# Patient Record
Sex: MALE | Race: WHITE | Employment: OTHER | URBAN - NONMETROPOLITAN AREA
[De-identification: names, ages, dates, MRNs, and addresses within clinical notes are randomized per-mention and may not be internally consistent; named-entity substitution may affect disease eponyms.]

---

## 2017-05-17 ENCOUNTER — HOSPITAL ENCOUNTER (OUTPATIENT)
Age: 82
Discharge: HOME | End: 2017-05-17
Payer: MEDICARE

## 2017-05-17 DIAGNOSIS — I10 ESSENTIAL HYPERTENSION: ICD-10-CM

## 2017-05-17 DIAGNOSIS — E11.42 TYPE 2 DIABETES MELLITUS WITH DIABETIC POLYNEUROPATHY, WITHOUT LONG-TERM CURRENT USE OF INSULIN (HCC): ICD-10-CM

## 2017-05-17 DIAGNOSIS — I25.810 CORONARY ARTERY DISEASE INVOLVING AUTOLOGOUS ARTERY CORONARY BYPASS GRAFT WITHOUT ANGINA PECTORIS: ICD-10-CM

## 2017-05-17 LAB
ANION GAP SERPL CALCULATED.3IONS-SCNC: 14 MMOL/L (ref 9–17)
BUN BLDV-MCNC: 21 MG/DL (ref 8–23)
BUN/CREAT BLD: 27 (ref 9–20)
CALCIUM SERPL-MCNC: 9.8 MG/DL (ref 8.6–10.4)
CHLORIDE BLD-SCNC: 96 MMOL/L (ref 98–107)
CO2: 29 MMOL/L (ref 20–31)
CREAT SERPL-MCNC: 0.78 MG/DL (ref 0.7–1.2)
ESTIMATED AVERAGE GLUCOSE: 174 MG/DL
GFR AFRICAN AMERICAN: >60 ML/MIN
GFR NON-AFRICAN AMERICAN: >60 ML/MIN
GFR SERPL CREATININE-BSD FRML MDRD: ABNORMAL ML/MIN/{1.73_M2}
GFR SERPL CREATININE-BSD FRML MDRD: ABNORMAL ML/MIN/{1.73_M2}
GLUCOSE BLD-MCNC: 122 MG/DL (ref 70–99)
HBA1C MFR BLD: 7.7 % (ref 4.8–5.9)
POTASSIUM SERPL-SCNC: 5 MMOL/L (ref 3.7–5.3)
SODIUM BLD-SCNC: 139 MMOL/L (ref 135–144)

## 2017-05-17 PROCEDURE — 83036 HEMOGLOBIN GLYCOSYLATED A1C: CPT

## 2017-05-17 PROCEDURE — 80048 BASIC METABOLIC PNL TOTAL CA: CPT

## 2017-05-17 PROCEDURE — 36415 COLL VENOUS BLD VENIPUNCTURE: CPT

## 2017-05-25 ENCOUNTER — HOSPITAL ENCOUNTER (OUTPATIENT)
Dept: VASCULAR LAB | Age: 82
Discharge: HOME | End: 2017-05-25
Payer: MEDICARE

## 2017-05-25 DIAGNOSIS — M79.605 PAIN IN BOTH LOWER EXTREMITIES: ICD-10-CM

## 2017-05-25 DIAGNOSIS — M79.604 PAIN IN BOTH LOWER EXTREMITIES: ICD-10-CM

## 2017-05-25 PROCEDURE — 93970 EXTREMITY STUDY: CPT

## 2017-06-06 ENCOUNTER — HOSPITAL ENCOUNTER (OUTPATIENT)
Dept: VASCULAR LAB | Age: 82
Discharge: HOME | End: 2017-06-06
Payer: MEDICARE

## 2017-06-06 DIAGNOSIS — R09.89 OTHER SPECIFIED SYMPTOMS AND SIGNS INVOLVING THE CIRCULATORY AND RESPIRATORY SYSTEMS: ICD-10-CM

## 2017-06-06 PROCEDURE — 93923 UPR/LXTR ART STDY 3+ LVLS: CPT

## 2017-08-12 ENCOUNTER — HOSPITAL ENCOUNTER (EMERGENCY)
Age: 82
Discharge: HOME | End: 2017-08-12
Attending: EMERGENCY MEDICINE
Payer: MEDICARE

## 2017-08-12 VITALS
TEMPERATURE: 97.8 F | OXYGEN SATURATION: 96 % | RESPIRATION RATE: 20 BRPM | DIASTOLIC BLOOD PRESSURE: 69 MMHG | HEART RATE: 74 BPM | SYSTOLIC BLOOD PRESSURE: 125 MMHG

## 2017-08-12 DIAGNOSIS — E86.0 DEHYDRATION: ICD-10-CM

## 2017-08-12 DIAGNOSIS — R19.7 DIARRHEA OF PRESUMED INFECTIOUS ORIGIN: Primary | ICD-10-CM

## 2017-08-12 LAB
-: ABNORMAL
ABSOLUTE BANDS #: 0.07 K/UL (ref 0–1)
ABSOLUTE EOS #: 0.07 K/UL (ref 0–0.4)
ABSOLUTE LYMPH #: 1.82 K/UL (ref 1–4.8)
ABSOLUTE MONO #: 0.78 K/UL (ref 0–1)
AMORPHOUS: ABNORMAL
ANION GAP SERPL CALCULATED.3IONS-SCNC: 15 MMOL/L (ref 9–17)
BACTERIA: ABNORMAL
BANDS: 1 %
BASOPHILS # BLD: 0 %
BASOPHILS ABSOLUTE: 0 K/UL (ref 0–0.2)
BILIRUBIN URINE: NEGATIVE
BUN BLDV-MCNC: 19 MG/DL (ref 8–23)
BUN/CREAT BLD: 35 (ref 9–20)
CALCIUM SERPL-MCNC: 9.6 MG/DL (ref 8.6–10.4)
CASTS UA: ABNORMAL /LPF
CHLORIDE BLD-SCNC: 97 MMOL/L (ref 98–107)
CO2: 24 MMOL/L (ref 20–31)
COLOR: YELLOW
COMMENT UA: ABNORMAL
CREAT SERPL-MCNC: 0.54 MG/DL (ref 0.7–1.2)
CRYSTALS, UA: ABNORMAL /HPF
DIFFERENTIAL TYPE: ABNORMAL
EOSINOPHILS RELATIVE PERCENT: 1 %
EPITHELIAL CELLS UA: ABNORMAL /HPF (ref 0–5)
GFR AFRICAN AMERICAN: >60 ML/MIN
GFR NON-AFRICAN AMERICAN: >60 ML/MIN
GFR SERPL CREATININE-BSD FRML MDRD: ABNORMAL ML/MIN/{1.73_M2}
GFR SERPL CREATININE-BSD FRML MDRD: ABNORMAL ML/MIN/{1.73_M2}
GLUCOSE BLD-MCNC: 159 MG/DL (ref 70–99)
GLUCOSE URINE: NEGATIVE
HCT VFR BLD CALC: 38.6 % (ref 41–53)
HEMOGLOBIN: 13.1 G/DL (ref 13.5–17)
KETONES, URINE: ABNORMAL
LEUKOCYTE ESTERASE, URINE: NEGATIVE
LYMPHOCYTES # BLD: 28 %
MCH RBC QN AUTO: 31.2 PG (ref 26–34)
MCHC RBC AUTO-ENTMCNC: 33.9 G/DL (ref 31–37)
MCV RBC AUTO: 92 FL (ref 80–100)
MONOCYTES # BLD: 12 %
MORPHOLOGY: NORMAL
MUCUS: ABNORMAL
NITRITE, URINE: NEGATIVE
OTHER OBSERVATIONS UA: ABNORMAL
PDW BLD-RTO: 13.7 % (ref 12.1–15.2)
PH UA: 5.5 (ref 5–9)
PLATELET # BLD: 146 K/UL (ref 140–450)
PLATELET ESTIMATE: ABNORMAL
PMV BLD AUTO: 8.7 FL (ref 6–12)
POTASSIUM SERPL-SCNC: 4.4 MMOL/L (ref 3.7–5.3)
PROTEIN UA: NEGATIVE
RBC # BLD: 4.19 M/UL (ref 4.5–5.9)
RBC # BLD: ABNORMAL 10*6/UL
RBC UA: ABNORMAL /HPF (ref 0–2)
RENAL EPITHELIAL, UA: ABNORMAL /HPF
SEG NEUTROPHILS: 58 %
SEGMENTED NEUTROPHILS ABSOLUTE COUNT: 3.76 K/UL (ref 1.8–7.7)
SODIUM BLD-SCNC: 136 MMOL/L (ref 135–144)
SPECIFIC GRAVITY UA: 1.02 (ref 1.01–1.02)
TRICHOMONAS: ABNORMAL
TURBIDITY: CLEAR
URINE HGB: NEGATIVE
UROBILINOGEN, URINE: NORMAL
WBC # BLD: 6.5 K/UL (ref 3.5–11)
WBC # BLD: ABNORMAL 10*3/UL
WBC UA: ABNORMAL /HPF (ref 0–5)
YEAST: ABNORMAL

## 2017-08-12 PROCEDURE — 6370000000 HC RX 637 (ALT 250 FOR IP): Performed by: EMERGENCY MEDICINE

## 2017-08-12 PROCEDURE — 2580000003 HC RX 258: Performed by: EMERGENCY MEDICINE

## 2017-08-12 PROCEDURE — 85025 COMPLETE CBC W/AUTO DIFF WBC: CPT

## 2017-08-12 PROCEDURE — 99284 EMERGENCY DEPT VISIT MOD MDM: CPT

## 2017-08-12 PROCEDURE — 36415 COLL VENOUS BLD VENIPUNCTURE: CPT

## 2017-08-12 PROCEDURE — 80048 BASIC METABOLIC PNL TOTAL CA: CPT

## 2017-08-12 PROCEDURE — 96375 TX/PRO/DX INJ NEW DRUG ADDON: CPT

## 2017-08-12 PROCEDURE — 87328 CRYPTOSPORIDIUM AG IA: CPT

## 2017-08-12 PROCEDURE — 96374 THER/PROPH/DIAG INJ IV PUSH: CPT

## 2017-08-12 PROCEDURE — 81001 URINALYSIS AUTO W/SCOPE: CPT

## 2017-08-12 PROCEDURE — 6360000002 HC RX W HCPCS: Performed by: EMERGENCY MEDICINE

## 2017-08-12 PROCEDURE — 96361 HYDRATE IV INFUSION ADD-ON: CPT

## 2017-08-12 PROCEDURE — 87329 GIARDIA AG IA: CPT

## 2017-08-12 PROCEDURE — 96372 THER/PROPH/DIAG INJ SC/IM: CPT

## 2017-08-12 RX ORDER — SODIUM CHLORIDE 9 MG/ML
150 INJECTION, SOLUTION INTRAVENOUS CONTINUOUS
Status: DISCONTINUED | OUTPATIENT
Start: 2017-08-12 | End: 2017-08-12 | Stop reason: HOSPADM

## 2017-08-12 RX ORDER — ONDANSETRON 4 MG/1
4 TABLET, FILM COATED ORAL EVERY 4 HOURS PRN
Qty: 15 TABLET | Refills: 0 | Status: SHIPPED | OUTPATIENT
Start: 2017-08-12 | End: 2017-10-03 | Stop reason: ALTCHOICE

## 2017-08-12 RX ORDER — DICYCLOMINE HCL 20 MG
20 TABLET ORAL EVERY 4 HOURS PRN
Qty: 30 TABLET | Refills: 0 | Status: SHIPPED | OUTPATIENT
Start: 2017-08-12 | End: 2017-10-03 | Stop reason: ALTCHOICE

## 2017-08-12 RX ORDER — DICYCLOMINE HYDROCHLORIDE 10 MG/1
20 CAPSULE ORAL ONCE
Status: COMPLETED | OUTPATIENT
Start: 2017-08-12 | End: 2017-08-12

## 2017-08-12 RX ORDER — ONDANSETRON 2 MG/ML
4 INJECTION INTRAMUSCULAR; INTRAVENOUS ONCE
Status: COMPLETED | OUTPATIENT
Start: 2017-08-12 | End: 2017-08-12

## 2017-08-12 RX ORDER — FENTANYL CITRATE 50 UG/ML
25 INJECTION, SOLUTION INTRAMUSCULAR; INTRAVENOUS ONCE
Status: COMPLETED | OUTPATIENT
Start: 2017-08-12 | End: 2017-08-12

## 2017-08-12 RX ORDER — ROSUVASTATIN CALCIUM 20 MG/1
20 TABLET, COATED ORAL DAILY
COMMUNITY
End: 2017-11-08 | Stop reason: SDUPTHER

## 2017-08-12 RX ORDER — DICYCLOMINE HYDROCHLORIDE 10 MG/ML
20 INJECTION INTRAMUSCULAR ONCE
Status: COMPLETED | OUTPATIENT
Start: 2017-08-12 | End: 2017-08-12

## 2017-08-12 RX ADMIN — SODIUM CHLORIDE 150 ML/HR: 9 INJECTION, SOLUTION INTRAVENOUS at 09:01

## 2017-08-12 RX ADMIN — DICYCLOMINE HYDROCHLORIDE 20 MG: 20 INJECTION, SOLUTION INTRAMUSCULAR at 09:03

## 2017-08-12 RX ADMIN — DICYCLOMINE HYDROCHLORIDE 20 MG: 10 CAPSULE ORAL at 11:06

## 2017-08-12 RX ADMIN — ONDANSETRON 4 MG: 2 INJECTION INTRAMUSCULAR; INTRAVENOUS at 09:01

## 2017-08-12 RX ADMIN — SODIUM CHLORIDE 500 ML: 9 INJECTION, SOLUTION INTRAVENOUS at 09:00

## 2017-08-12 RX ADMIN — FENTANYL CITRATE 25 MCG: 50 INJECTION INTRAMUSCULAR; INTRAVENOUS at 09:05

## 2017-08-12 ASSESSMENT — ENCOUNTER SYMPTOMS
BACK PAIN: 0
NAUSEA: 1
COUGH: 0
COLOR CHANGE: 0
FACIAL SWELLING: 0
VOMITING: 0
TROUBLE SWALLOWING: 0
ABDOMINAL DISTENTION: 0
CONSTIPATION: 0
DIARRHEA: 1
ABDOMINAL PAIN: 0
SHORTNESS OF BREATH: 0
WHEEZING: 0

## 2017-08-12 ASSESSMENT — PAIN SCALES - GENERAL
PAINLEVEL_OUTOF10: 4
PAINLEVEL_OUTOF10: 5

## 2017-08-16 LAB
DIRECT EXAM: NORMAL
Lab: NORMAL
SPECIMEN DESCRIPTION: NORMAL
SPECIMEN DESCRIPTION: NORMAL
STATUS: NORMAL

## 2017-09-27 ENCOUNTER — HOSPITAL ENCOUNTER (OUTPATIENT)
Age: 82
Discharge: HOME | End: 2017-09-27
Payer: MEDICARE

## 2017-09-27 DIAGNOSIS — E11.42 TYPE 2 DIABETES MELLITUS WITH DIABETIC POLYNEUROPATHY, WITHOUT LONG-TERM CURRENT USE OF INSULIN (HCC): ICD-10-CM

## 2017-09-27 LAB
ESTIMATED AVERAGE GLUCOSE: 140 MG/DL
HBA1C MFR BLD: 6.5 % (ref 4.8–5.9)

## 2017-09-27 PROCEDURE — 36415 COLL VENOUS BLD VENIPUNCTURE: CPT

## 2017-09-27 PROCEDURE — 83036 HEMOGLOBIN GLYCOSYLATED A1C: CPT

## 2018-01-26 ENCOUNTER — HOSPITAL ENCOUNTER (OUTPATIENT)
Age: 83
Discharge: HOME | End: 2018-01-26
Payer: MEDICARE

## 2018-01-26 DIAGNOSIS — E11.42 TYPE 2 DIABETES MELLITUS WITH DIABETIC POLYNEUROPATHY, WITHOUT LONG-TERM CURRENT USE OF INSULIN (HCC): ICD-10-CM

## 2018-01-26 DIAGNOSIS — E78.2 MIXED HYPERLIPIDEMIA: ICD-10-CM

## 2018-01-26 DIAGNOSIS — I10 ESSENTIAL HYPERTENSION: ICD-10-CM

## 2018-01-26 DIAGNOSIS — I25.810 CORONARY ARTERY DISEASE INVOLVING AUTOLOGOUS ARTERY CORONARY BYPASS GRAFT WITHOUT ANGINA PECTORIS: ICD-10-CM

## 2018-01-26 LAB
ANION GAP SERPL CALCULATED.3IONS-SCNC: 13 MMOL/L (ref 9–17)
BUN BLDV-MCNC: 19 MG/DL (ref 8–23)
BUN/CREAT BLD: 26 (ref 9–20)
CALCIUM SERPL-MCNC: 9.3 MG/DL (ref 8.6–10.4)
CHLORIDE BLD-SCNC: 98 MMOL/L (ref 98–107)
CHOLESTEROL, FASTING: 123 MG/DL
CHOLESTEROL/HDL RATIO: 3.2
CO2: 28 MMOL/L (ref 20–31)
CREAT SERPL-MCNC: 0.72 MG/DL (ref 0.7–1.2)
CREATININE URINE: 83.8 MG/DL (ref 39–259)
ESTIMATED AVERAGE GLUCOSE: 151 MG/DL
GFR AFRICAN AMERICAN: >60 ML/MIN
GFR NON-AFRICAN AMERICAN: >60 ML/MIN
GFR SERPL CREATININE-BSD FRML MDRD: ABNORMAL ML/MIN/{1.73_M2}
GFR SERPL CREATININE-BSD FRML MDRD: ABNORMAL ML/MIN/{1.73_M2}
GLUCOSE FASTING: 128 MG/DL (ref 70–99)
HBA1C MFR BLD: 6.9 % (ref 4.8–5.9)
HDLC SERPL-MCNC: 38 MG/DL
LDL CHOLESTEROL: 69 MG/DL (ref 0–130)
MICROALBUMIN/CREAT 24H UR: <12 MG/L
MICROALBUMIN/CREAT UR-RTO: NORMAL MCG/MG CREAT
POTASSIUM SERPL-SCNC: 4.8 MMOL/L (ref 3.7–5.3)
SODIUM BLD-SCNC: 139 MMOL/L (ref 135–144)
TRIGLYCERIDE, FASTING: 81 MG/DL
VLDLC SERPL CALC-MCNC: ABNORMAL MG/DL (ref 1–30)

## 2018-01-26 PROCEDURE — 82043 UR ALBUMIN QUANTITATIVE: CPT

## 2018-01-26 PROCEDURE — 80061 LIPID PANEL: CPT

## 2018-01-26 PROCEDURE — 36415 COLL VENOUS BLD VENIPUNCTURE: CPT

## 2018-01-26 PROCEDURE — 80048 BASIC METABOLIC PNL TOTAL CA: CPT

## 2018-01-26 PROCEDURE — 83036 HEMOGLOBIN GLYCOSYLATED A1C: CPT

## 2018-01-26 PROCEDURE — 82570 ASSAY OF URINE CREATININE: CPT

## 2018-05-25 ENCOUNTER — HOSPITAL ENCOUNTER (OUTPATIENT)
Age: 83
Discharge: HOME | End: 2018-05-25
Payer: MEDICARE

## 2018-05-25 DIAGNOSIS — E11.42 TYPE 2 DIABETES MELLITUS WITH DIABETIC POLYNEUROPATHY, WITHOUT LONG-TERM CURRENT USE OF INSULIN (HCC): ICD-10-CM

## 2018-05-25 LAB
ESTIMATED AVERAGE GLUCOSE: 151 MG/DL
HBA1C MFR BLD: 6.9 % (ref 4.8–5.9)

## 2018-05-25 PROCEDURE — 83036 HEMOGLOBIN GLYCOSYLATED A1C: CPT

## 2018-05-25 PROCEDURE — 36415 COLL VENOUS BLD VENIPUNCTURE: CPT

## 2018-09-28 ENCOUNTER — HOSPITAL ENCOUNTER (OUTPATIENT)
Age: 83
Discharge: HOME | End: 2018-09-28
Payer: MEDICARE

## 2018-09-28 DIAGNOSIS — E11.42 TYPE 2 DIABETES MELLITUS WITH DIABETIC POLYNEUROPATHY, WITHOUT LONG-TERM CURRENT USE OF INSULIN (HCC): ICD-10-CM

## 2018-09-28 DIAGNOSIS — I10 ESSENTIAL HYPERTENSION: ICD-10-CM

## 2018-09-28 LAB
ANION GAP SERPL CALCULATED.3IONS-SCNC: 10 MMOL/L (ref 9–17)
BUN BLDV-MCNC: 16 MG/DL (ref 8–23)
BUN/CREAT BLD: 22 (ref 9–20)
CALCIUM SERPL-MCNC: 9.5 MG/DL (ref 8.6–10.4)
CHLORIDE BLD-SCNC: 100 MMOL/L (ref 98–107)
CO2: 31 MMOL/L (ref 20–31)
CREAT SERPL-MCNC: 0.73 MG/DL (ref 0.7–1.2)
ESTIMATED AVERAGE GLUCOSE: 151 MG/DL
GFR AFRICAN AMERICAN: >60 ML/MIN
GFR NON-AFRICAN AMERICAN: >60 ML/MIN
GFR SERPL CREATININE-BSD FRML MDRD: ABNORMAL ML/MIN/{1.73_M2}
GFR SERPL CREATININE-BSD FRML MDRD: ABNORMAL ML/MIN/{1.73_M2}
GLUCOSE BLD-MCNC: 163 MG/DL (ref 70–99)
HBA1C MFR BLD: 6.9 % (ref 4.8–5.9)
POTASSIUM SERPL-SCNC: 4.8 MMOL/L (ref 3.7–5.3)
SODIUM BLD-SCNC: 141 MMOL/L (ref 135–144)

## 2018-09-28 PROCEDURE — 80048 BASIC METABOLIC PNL TOTAL CA: CPT

## 2018-09-28 PROCEDURE — 83036 HEMOGLOBIN GLYCOSYLATED A1C: CPT

## 2018-09-28 PROCEDURE — 36415 COLL VENOUS BLD VENIPUNCTURE: CPT

## 2019-01-28 ENCOUNTER — HOSPITAL ENCOUNTER (OUTPATIENT)
Age: 84
Discharge: HOME | End: 2019-01-28
Payer: MEDICARE

## 2019-01-28 DIAGNOSIS — E11.42 TYPE 2 DIABETES MELLITUS WITH DIABETIC POLYNEUROPATHY, WITHOUT LONG-TERM CURRENT USE OF INSULIN (HCC): ICD-10-CM

## 2019-01-28 DIAGNOSIS — I10 ESSENTIAL HYPERTENSION: ICD-10-CM

## 2019-01-28 DIAGNOSIS — E78.2 MIXED HYPERLIPIDEMIA: ICD-10-CM

## 2019-01-28 LAB
ANION GAP SERPL CALCULATED.3IONS-SCNC: 11 MMOL/L (ref 9–17)
BUN BLDV-MCNC: 16 MG/DL (ref 8–23)
BUN/CREAT BLD: 22 (ref 9–20)
CALCIUM SERPL-MCNC: 10.4 MG/DL (ref 8.6–10.4)
CHLORIDE BLD-SCNC: 100 MMOL/L (ref 98–107)
CHOLESTEROL/HDL RATIO: 2.8
CHOLESTEROL: 114 MG/DL
CO2: 30 MMOL/L (ref 20–31)
CREAT SERPL-MCNC: 0.74 MG/DL (ref 0.7–1.2)
CREATININE URINE: 83 MG/DL (ref 39–259)
ESTIMATED AVERAGE GLUCOSE: 148 MG/DL
GFR AFRICAN AMERICAN: >60 ML/MIN
GFR NON-AFRICAN AMERICAN: >60 ML/MIN
GFR SERPL CREATININE-BSD FRML MDRD: ABNORMAL ML/MIN/{1.73_M2}
GFR SERPL CREATININE-BSD FRML MDRD: ABNORMAL ML/MIN/{1.73_M2}
GLUCOSE BLD-MCNC: 127 MG/DL (ref 70–99)
HBA1C MFR BLD: 6.8 % (ref 4.8–5.9)
HDLC SERPL-MCNC: 41 MG/DL
LDL CHOLESTEROL: 54 MG/DL (ref 0–130)
MICROALBUMIN/CREAT 24H UR: 12 MG/L
MICROALBUMIN/CREAT UR-RTO: 14 MCG/MG CREAT
POTASSIUM SERPL-SCNC: 4.5 MMOL/L (ref 3.7–5.3)
SODIUM BLD-SCNC: 141 MMOL/L (ref 135–144)
TRIGL SERPL-MCNC: 96 MG/DL
VLDLC SERPL CALC-MCNC: NORMAL MG/DL (ref 1–30)

## 2019-01-28 PROCEDURE — 80048 BASIC METABOLIC PNL TOTAL CA: CPT

## 2019-01-28 PROCEDURE — 83036 HEMOGLOBIN GLYCOSYLATED A1C: CPT

## 2019-01-28 PROCEDURE — 82570 ASSAY OF URINE CREATININE: CPT

## 2019-01-28 PROCEDURE — 36415 COLL VENOUS BLD VENIPUNCTURE: CPT

## 2019-01-28 PROCEDURE — 80061 LIPID PANEL: CPT

## 2019-01-28 PROCEDURE — 82043 UR ALBUMIN QUANTITATIVE: CPT

## 2019-02-05 PROBLEM — E11.42 DM TYPE 2 WITH DIABETIC PERIPHERAL NEUROPATHY (HCC): Status: ACTIVE | Noted: 2019-02-05

## 2019-05-30 ENCOUNTER — HOSPITAL ENCOUNTER (OUTPATIENT)
Age: 84
Discharge: HOME | End: 2019-05-30
Payer: MEDICARE

## 2019-05-30 DIAGNOSIS — E11.42 DM TYPE 2 WITH DIABETIC PERIPHERAL NEUROPATHY (HCC): ICD-10-CM

## 2019-05-30 LAB
ESTIMATED AVERAGE GLUCOSE: 146 MG/DL
HBA1C MFR BLD: 6.7 % (ref 4.8–5.9)

## 2019-05-30 PROCEDURE — 36415 COLL VENOUS BLD VENIPUNCTURE: CPT

## 2019-05-30 PROCEDURE — 83036 HEMOGLOBIN GLYCOSYLATED A1C: CPT

## 2019-10-29 ENCOUNTER — HOSPITAL ENCOUNTER (OUTPATIENT)
Age: 84
Discharge: HOME | End: 2019-10-29
Payer: MEDICARE

## 2019-10-29 DIAGNOSIS — E11.42 TYPE 2 DIABETES MELLITUS WITH DIABETIC POLYNEUROPATHY, WITHOUT LONG-TERM CURRENT USE OF INSULIN (HCC): ICD-10-CM

## 2019-10-29 LAB
ESTIMATED AVERAGE GLUCOSE: 154 MG/DL
HBA1C MFR BLD: 7 % (ref 4.8–5.9)

## 2019-10-29 PROCEDURE — 83036 HEMOGLOBIN GLYCOSYLATED A1C: CPT

## 2019-10-29 PROCEDURE — 36415 COLL VENOUS BLD VENIPUNCTURE: CPT

## 2020-03-02 ENCOUNTER — HOSPITAL ENCOUNTER (OUTPATIENT)
Age: 85
Discharge: HOME | End: 2020-03-02
Payer: MEDICARE

## 2020-03-02 LAB
ESTIMATED AVERAGE GLUCOSE: 160 MG/DL
HBA1C MFR BLD: 7.2 % (ref 4.8–5.9)

## 2020-03-02 PROCEDURE — 36415 COLL VENOUS BLD VENIPUNCTURE: CPT

## 2020-03-02 PROCEDURE — 83036 HEMOGLOBIN GLYCOSYLATED A1C: CPT

## 2020-07-01 ENCOUNTER — HOSPITAL ENCOUNTER (OUTPATIENT)
Age: 85
Discharge: HOME | End: 2020-07-01
Payer: MEDICARE

## 2020-07-01 LAB
ANION GAP SERPL CALCULATED.3IONS-SCNC: 11 MMOL/L (ref 9–17)
BUN BLDV-MCNC: 20 MG/DL (ref 8–23)
BUN/CREAT BLD: 30 (ref 9–20)
CALCIUM SERPL-MCNC: 9.5 MG/DL (ref 8.6–10.4)
CHLORIDE BLD-SCNC: 100 MMOL/L (ref 98–107)
CHOLESTEROL, FASTING: 121 MG/DL
CHOLESTEROL/HDL RATIO: 3.4
CO2: 27 MMOL/L (ref 20–31)
CREAT SERPL-MCNC: 0.66 MG/DL (ref 0.7–1.2)
CREATININE URINE: 73.7 MG/DL (ref 39–259)
ESTIMATED AVERAGE GLUCOSE: 154 MG/DL
GFR AFRICAN AMERICAN: >60 ML/MIN
GFR NON-AFRICAN AMERICAN: >60 ML/MIN
GFR SERPL CREATININE-BSD FRML MDRD: ABNORMAL ML/MIN/{1.73_M2}
GFR SERPL CREATININE-BSD FRML MDRD: ABNORMAL ML/MIN/{1.73_M2}
GLUCOSE FASTING: 109 MG/DL (ref 70–99)
HBA1C MFR BLD: 7 % (ref 4.8–5.9)
HDLC SERPL-MCNC: 36 MG/DL
LDL CHOLESTEROL: 62 MG/DL (ref 0–130)
MICROALBUMIN/CREAT 24H UR: 18 MG/L
MICROALBUMIN/CREAT UR-RTO: 24 MCG/MG CREAT
POTASSIUM SERPL-SCNC: 4.9 MMOL/L (ref 3.7–5.3)
SODIUM BLD-SCNC: 138 MMOL/L (ref 135–144)
TRIGLYCERIDE, FASTING: 115 MG/DL
VLDLC SERPL CALC-MCNC: ABNORMAL MG/DL (ref 1–30)

## 2020-07-01 PROCEDURE — 80061 LIPID PANEL: CPT

## 2020-07-01 PROCEDURE — 82043 UR ALBUMIN QUANTITATIVE: CPT

## 2020-07-01 PROCEDURE — 80048 BASIC METABOLIC PNL TOTAL CA: CPT

## 2020-07-01 PROCEDURE — 83036 HEMOGLOBIN GLYCOSYLATED A1C: CPT

## 2020-07-01 PROCEDURE — 36415 COLL VENOUS BLD VENIPUNCTURE: CPT

## 2020-07-01 PROCEDURE — 82570 ASSAY OF URINE CREATININE: CPT

## 2021-01-06 ENCOUNTER — HOSPITAL ENCOUNTER (OUTPATIENT)
Age: 86
Discharge: HOME | End: 2021-01-06
Payer: MEDICARE

## 2021-01-06 DIAGNOSIS — E11.42 TYPE 2 DIABETES MELLITUS WITH DIABETIC POLYNEUROPATHY, WITHOUT LONG-TERM CURRENT USE OF INSULIN (HCC): ICD-10-CM

## 2021-01-06 LAB
ESTIMATED AVERAGE GLUCOSE: 163 MG/DL
HBA1C MFR BLD: 7.3 % (ref 4–6)

## 2021-01-06 PROCEDURE — 36415 COLL VENOUS BLD VENIPUNCTURE: CPT

## 2021-01-06 PROCEDURE — 83036 HEMOGLOBIN GLYCOSYLATED A1C: CPT

## 2021-07-09 ENCOUNTER — HOSPITAL ENCOUNTER (OUTPATIENT)
Age: 86
Discharge: HOME | End: 2021-07-09
Payer: MEDICARE

## 2021-07-09 DIAGNOSIS — E11.42 TYPE 2 DIABETES MELLITUS WITH DIABETIC POLYNEUROPATHY, WITHOUT LONG-TERM CURRENT USE OF INSULIN (HCC): ICD-10-CM

## 2021-07-09 DIAGNOSIS — E78.2 MIXED HYPERLIPIDEMIA: ICD-10-CM

## 2021-07-09 LAB
ANION GAP SERPL CALCULATED.3IONS-SCNC: 12 MMOL/L (ref 9–17)
BUN BLDV-MCNC: 17 MG/DL (ref 8–23)
BUN/CREAT BLD: 27 (ref 9–20)
CALCIUM SERPL-MCNC: 9.6 MG/DL (ref 8.6–10.4)
CHLORIDE BLD-SCNC: 101 MMOL/L (ref 98–107)
CHOLESTEROL, FASTING: 119 MG/DL
CHOLESTEROL/HDL RATIO: 3.2
CO2: 25 MMOL/L (ref 20–31)
CREAT SERPL-MCNC: 0.64 MG/DL (ref 0.7–1.2)
CREATININE URINE: 59.1 MG/DL (ref 39–259)
ESTIMATED AVERAGE GLUCOSE: 148 MG/DL
GFR AFRICAN AMERICAN: >60 ML/MIN
GFR NON-AFRICAN AMERICAN: >60 ML/MIN
GFR SERPL CREATININE-BSD FRML MDRD: ABNORMAL ML/MIN/{1.73_M2}
GFR SERPL CREATININE-BSD FRML MDRD: ABNORMAL ML/MIN/{1.73_M2}
GLUCOSE FASTING: 96 MG/DL (ref 70–99)
HBA1C MFR BLD: 6.8 % (ref 4–6)
HDLC SERPL-MCNC: 37 MG/DL
LDL CHOLESTEROL: 66 MG/DL (ref 0–130)
MICROALBUMIN/CREAT 24H UR: 12 MG/L
MICROALBUMIN/CREAT UR-RTO: 20 MCG/MG CREAT
POTASSIUM SERPL-SCNC: 4.4 MMOL/L (ref 3.7–5.3)
SODIUM BLD-SCNC: 138 MMOL/L (ref 135–144)
TRIGLYCERIDE, FASTING: 81 MG/DL
VLDLC SERPL CALC-MCNC: ABNORMAL MG/DL (ref 1–30)

## 2021-07-09 PROCEDURE — 80061 LIPID PANEL: CPT

## 2021-07-09 PROCEDURE — 83036 HEMOGLOBIN GLYCOSYLATED A1C: CPT

## 2021-07-09 PROCEDURE — 36415 COLL VENOUS BLD VENIPUNCTURE: CPT

## 2021-07-09 PROCEDURE — 82570 ASSAY OF URINE CREATININE: CPT

## 2021-07-09 PROCEDURE — 82043 UR ALBUMIN QUANTITATIVE: CPT

## 2021-07-09 PROCEDURE — 80048 BASIC METABOLIC PNL TOTAL CA: CPT

## 2021-12-02 ENCOUNTER — HOSPITAL ENCOUNTER (INPATIENT)
Age: 86
LOS: 1 days | Discharge: HOME | DRG: 308 | End: 2021-12-03
Attending: FAMILY MEDICINE | Admitting: INTERNAL MEDICINE
Payer: MEDICARE

## 2021-12-02 ENCOUNTER — APPOINTMENT (OUTPATIENT)
Dept: GENERAL RADIOLOGY | Age: 86
DRG: 308 | End: 2021-12-02
Payer: MEDICARE

## 2021-12-02 ENCOUNTER — HOSPITAL ENCOUNTER (OUTPATIENT)
Dept: NON INVASIVE DIAGNOSTICS | Age: 86
Discharge: HOME | DRG: 308 | End: 2021-12-02
Payer: MEDICARE

## 2021-12-02 DIAGNOSIS — I48.91 ATRIAL FIBRILLATION WITH RVR (HCC): ICD-10-CM

## 2021-12-02 DIAGNOSIS — I48.91 ATRIAL FIBRILLATION, NEW ONSET (HCC): Primary | ICD-10-CM

## 2021-12-02 LAB
ABSOLUTE EOS #: 0.15 K/UL (ref 0–0.44)
ABSOLUTE IMMATURE GRANULOCYTE: <0.03 K/UL (ref 0–0.3)
ABSOLUTE LYMPH #: 1.84 K/UL (ref 1.1–3.7)
ABSOLUTE MONO #: 0.8 K/UL (ref 0.1–1.2)
ANION GAP SERPL CALCULATED.3IONS-SCNC: 12 MMOL/L (ref 9–17)
BASOPHILS # BLD: 1 % (ref 0–2)
BASOPHILS ABSOLUTE: 0.04 K/UL (ref 0–0.2)
BNP INTERPRETATION: ABNORMAL
BUN BLDV-MCNC: 21 MG/DL (ref 8–23)
BUN/CREAT BLD: 28 (ref 9–20)
CALCIUM SERPL-MCNC: 9.6 MG/DL (ref 8.6–10.4)
CHLORIDE BLD-SCNC: 101 MMOL/L (ref 98–107)
CO2: 23 MMOL/L (ref 20–31)
CREAT SERPL-MCNC: 0.75 MG/DL (ref 0.7–1.2)
DIFFERENTIAL TYPE: ABNORMAL
EKG ATRIAL RATE: 241 BPM
EKG Q-T INTERVAL: 318 MS
EKG QRS DURATION: 80 MS
EKG QTC CALCULATION (BAZETT): 438 MS
EKG R AXIS: 67 DEGREES
EKG T AXIS: 6 DEGREES
EKG VENTRICULAR RATE: 114 BPM
EOSINOPHILS RELATIVE PERCENT: 2 % (ref 1–4)
GFR AFRICAN AMERICAN: >60 ML/MIN
GFR NON-AFRICAN AMERICAN: >60 ML/MIN
GFR SERPL CREATININE-BSD FRML MDRD: ABNORMAL ML/MIN/{1.73_M2}
GFR SERPL CREATININE-BSD FRML MDRD: ABNORMAL ML/MIN/{1.73_M2}
GLUCOSE BLD-MCNC: 159 MG/DL (ref 70–99)
HCT VFR BLD CALC: 40 % (ref 40.7–50.3)
HEMOGLOBIN: 12.9 G/DL (ref 13–17)
IMMATURE GRANULOCYTES: 0 %
LV EF: 53 %
LVEF MODALITY: NORMAL
LYMPHOCYTES # BLD: 22 % (ref 24–43)
MAGNESIUM: 2.1 MG/DL (ref 1.6–2.6)
MCH RBC QN AUTO: 30.6 PG (ref 25.2–33.5)
MCHC RBC AUTO-ENTMCNC: 32.3 G/DL (ref 28.4–34.8)
MCV RBC AUTO: 94.8 FL (ref 82.6–102.9)
MONOCYTES # BLD: 10 % (ref 3–12)
NRBC AUTOMATED: 0 PER 100 WBC
PDW BLD-RTO: 14 % (ref 11.8–14.4)
PLATELET # BLD: 174 K/UL (ref 138–453)
PLATELET ESTIMATE: ABNORMAL
PMV BLD AUTO: 10 FL (ref 8.1–13.5)
POTASSIUM SERPL-SCNC: 5 MMOL/L (ref 3.7–5.3)
PRO-BNP: 1611 PG/ML
RBC # BLD: 4.22 M/UL (ref 4.21–5.77)
RBC # BLD: ABNORMAL 10*6/UL
SARS-COV-2, RAPID: NOT DETECTED
SEG NEUTROPHILS: 65 % (ref 36–65)
SEGMENTED NEUTROPHILS ABSOLUTE COUNT: 5.44 K/UL (ref 1.5–8.1)
SODIUM BLD-SCNC: 136 MMOL/L (ref 135–144)
SPECIMEN DESCRIPTION: NORMAL
TROPONIN INTERP: NORMAL
TROPONIN T: NORMAL NG/ML
TROPONIN, HIGH SENSITIVITY: 14 NG/L (ref 0–22)
TSH SERPL DL<=0.05 MIU/L-ACNC: 2.62 MIU/L (ref 0.3–5)
WBC # BLD: 8.3 K/UL (ref 3.5–11.3)
WBC # BLD: ABNORMAL 10*3/UL

## 2021-12-02 PROCEDURE — 83735 ASSAY OF MAGNESIUM: CPT

## 2021-12-02 PROCEDURE — 36415 COLL VENOUS BLD VENIPUNCTURE: CPT

## 2021-12-02 PROCEDURE — 93306 TTE W/DOPPLER COMPLETE: CPT

## 2021-12-02 PROCEDURE — 2700000000 HC OXYGEN THERAPY PER DAY

## 2021-12-02 PROCEDURE — C9803 HOPD COVID-19 SPEC COLLECT: HCPCS

## 2021-12-02 PROCEDURE — 71045 X-RAY EXAM CHEST 1 VIEW: CPT

## 2021-12-02 PROCEDURE — 2580000003 HC RX 258: Performed by: FAMILY MEDICINE

## 2021-12-02 PROCEDURE — 99222 1ST HOSP IP/OBS MODERATE 55: CPT | Performed by: FAMILY MEDICINE

## 2021-12-02 PROCEDURE — 2000000000 HC ICU R&B

## 2021-12-02 PROCEDURE — 93010 ELECTROCARDIOGRAM REPORT: CPT | Performed by: FAMILY MEDICINE

## 2021-12-02 PROCEDURE — 6360000002 HC RX W HCPCS: Performed by: FAMILY MEDICINE

## 2021-12-02 PROCEDURE — 85025 COMPLETE CBC W/AUTO DIFF WBC: CPT

## 2021-12-02 PROCEDURE — 96375 TX/PRO/DX INJ NEW DRUG ADDON: CPT

## 2021-12-02 PROCEDURE — 94761 N-INVAS EAR/PLS OXIMETRY MLT: CPT

## 2021-12-02 PROCEDURE — 6370000000 HC RX 637 (ALT 250 FOR IP): Performed by: FAMILY MEDICINE

## 2021-12-02 PROCEDURE — 84443 ASSAY THYROID STIM HORMONE: CPT

## 2021-12-02 PROCEDURE — 80048 BASIC METABOLIC PNL TOTAL CA: CPT

## 2021-12-02 PROCEDURE — 2500000003 HC RX 250 WO HCPCS: Performed by: FAMILY MEDICINE

## 2021-12-02 PROCEDURE — 93005 ELECTROCARDIOGRAM TRACING: CPT | Performed by: FAMILY MEDICINE

## 2021-12-02 PROCEDURE — 87635 SARS-COV-2 COVID-19 AMP PRB: CPT

## 2021-12-02 PROCEDURE — 83880 ASSAY OF NATRIURETIC PEPTIDE: CPT

## 2021-12-02 PROCEDURE — 84484 ASSAY OF TROPONIN QUANT: CPT

## 2021-12-02 PROCEDURE — 96374 THER/PROPH/DIAG INJ IV PUSH: CPT

## 2021-12-02 PROCEDURE — 99284 EMERGENCY DEPT VISIT MOD MDM: CPT

## 2021-12-02 RX ORDER — ONDANSETRON 2 MG/ML
4 INJECTION INTRAMUSCULAR; INTRAVENOUS EVERY 6 HOURS PRN
Status: DISCONTINUED | OUTPATIENT
Start: 2021-12-02 | End: 2021-12-03 | Stop reason: HOSPADM

## 2021-12-02 RX ORDER — METOPROLOL TARTRATE 50 MG/1
50 TABLET, FILM COATED ORAL 2 TIMES DAILY
Status: DISCONTINUED | OUTPATIENT
Start: 2021-12-03 | End: 2021-12-03

## 2021-12-02 RX ORDER — POLYETHYLENE GLYCOL 3350 17 G/17G
17 POWDER, FOR SOLUTION ORAL DAILY PRN
Status: DISCONTINUED | OUTPATIENT
Start: 2021-12-02 | End: 2021-12-03 | Stop reason: HOSPADM

## 2021-12-02 RX ORDER — FUROSEMIDE 10 MG/ML
20 INJECTION INTRAMUSCULAR; INTRAVENOUS ONCE
Status: COMPLETED | OUTPATIENT
Start: 2021-12-02 | End: 2021-12-02

## 2021-12-02 RX ORDER — DILTIAZEM HYDROCHLORIDE 5 MG/ML
10 INJECTION INTRAVENOUS ONCE
Status: COMPLETED | OUTPATIENT
Start: 2021-12-02 | End: 2021-12-02

## 2021-12-02 RX ORDER — GABAPENTIN 300 MG/1
600 CAPSULE ORAL 2 TIMES DAILY
Status: DISCONTINUED | OUTPATIENT
Start: 2021-12-03 | End: 2021-12-03 | Stop reason: HOSPADM

## 2021-12-02 RX ORDER — SODIUM CHLORIDE 0.9 % (FLUSH) 0.9 %
10 SYRINGE (ML) INJECTION PRN
Status: DISCONTINUED | OUTPATIENT
Start: 2021-12-02 | End: 2021-12-03 | Stop reason: HOSPADM

## 2021-12-02 RX ORDER — OXYBUTYNIN CHLORIDE 5 MG/1
5 TABLET ORAL 2 TIMES DAILY
Status: DISCONTINUED | OUTPATIENT
Start: 2021-12-03 | End: 2021-12-03 | Stop reason: HOSPADM

## 2021-12-02 RX ORDER — ACETAMINOPHEN 650 MG/1
650 SUPPOSITORY RECTAL EVERY 6 HOURS PRN
Status: DISCONTINUED | OUTPATIENT
Start: 2021-12-02 | End: 2021-12-03 | Stop reason: HOSPADM

## 2021-12-02 RX ORDER — GLIPIZIDE AND METFORMIN HCL 2.5; 5 MG/1; MG/1
1 TABLET, FILM COATED ORAL 3 TIMES DAILY
Status: DISCONTINUED | OUTPATIENT
Start: 2021-12-03 | End: 2021-12-03 | Stop reason: SDUPTHER

## 2021-12-02 RX ORDER — AMLODIPINE BESYLATE 5 MG/1
5 TABLET ORAL DAILY
Status: DISCONTINUED | OUTPATIENT
Start: 2021-12-03 | End: 2021-12-03 | Stop reason: HOSPADM

## 2021-12-02 RX ORDER — ASPIRIN 81 MG/1
81 TABLET ORAL DAILY
Status: DISCONTINUED | OUTPATIENT
Start: 2021-12-03 | End: 2021-12-03 | Stop reason: HOSPADM

## 2021-12-02 RX ORDER — ACETAMINOPHEN 325 MG/1
650 TABLET ORAL EVERY 6 HOURS PRN
Status: DISCONTINUED | OUTPATIENT
Start: 2021-12-02 | End: 2021-12-03 | Stop reason: HOSPADM

## 2021-12-02 RX ORDER — SODIUM CHLORIDE 0.9 % (FLUSH) 0.9 %
5-40 SYRINGE (ML) INJECTION EVERY 12 HOURS SCHEDULED
Status: DISCONTINUED | OUTPATIENT
Start: 2021-12-03 | End: 2021-12-03 | Stop reason: HOSPADM

## 2021-12-02 RX ORDER — ROSUVASTATIN CALCIUM 20 MG/1
20 TABLET, COATED ORAL DAILY
Status: DISCONTINUED | OUTPATIENT
Start: 2021-12-03 | End: 2021-12-03 | Stop reason: HOSPADM

## 2021-12-02 RX ORDER — ONDANSETRON 4 MG/1
4 TABLET, ORALLY DISINTEGRATING ORAL EVERY 8 HOURS PRN
Status: DISCONTINUED | OUTPATIENT
Start: 2021-12-02 | End: 2021-12-03 | Stop reason: HOSPADM

## 2021-12-02 RX ADMIN — DILTIAZEM HYDROCHLORIDE 5 MG/HR: 5 INJECTION INTRAVENOUS at 11:31

## 2021-12-02 RX ADMIN — APIXABAN 5 MG: 5 TABLET, FILM COATED ORAL at 20:46

## 2021-12-02 RX ADMIN — FUROSEMIDE 20 MG: 10 INJECTION, SOLUTION INTRAVENOUS at 20:47

## 2021-12-02 RX ADMIN — APIXABAN 5 MG: 5 TABLET, FILM COATED ORAL at 14:47

## 2021-12-02 RX ADMIN — DILTIAZEM HYDROCHLORIDE 10 MG: 5 INJECTION INTRAVENOUS at 11:26

## 2021-12-02 ASSESSMENT — ENCOUNTER SYMPTOMS
GASTROINTESTINAL NEGATIVE: 1
RESPIRATORY NEGATIVE: 1
EYES NEGATIVE: 1

## 2021-12-02 NOTE — CONSULTS
Haven Cabral am scribing for and in the presence of Alie Armas. Pam HART, MS, F.A.C.C..    Patient: Marlena Parks  : 1933  Date of Admission: 2021  Primary Care Physician: Vita Cisneros  Today's Date: 2021    REASON FOR CONSULTATION: Atrial Fibrillation (New onset. Sent by Dr Greg Benoit. Pt c/o shortness of breath)      HPI:  Mr. Dionisio Villalba is a 80 y.o. male who was admitted to the hospital for new onset atrial fibrillation. He has never had this in the past.     History of 3 vessel bypass in 2010. No heart attacks. No history of weakened heart in the past.     He reports following in the past with Dr. Jaden Vazquez in [facility] but has not seen him in over 3 years. He reports it has been awhile since he's seen them. He reports he was breathing a little harder then normal the last couple days since Tuesday. He was sent by Vita Cisneros MD to ER. He was feeling a little more tired as well. He reports his blood pressure has been good, it was just the heart rate. His feet have been swelling a little more lately. He denies a history of sleep apnea. He does not wake up gasping or choking for air. No snoring reported. He denied any current or recent chest pain, abdominal pain, bleeding problems, bowel issues, problems with his medications or any other concerns at this time.      Past Medical History:   Diagnosis Date    ACE inhibitor-aggravated angioedema     CAD (coronary artery disease)     Enlarged prostate     normal cystoscopy 2008 Dr. Shanika Jimenez hypertension     History of Holter monitoring 2015    symptomatic PACs    Hx of echocardiogram     normal    Hyperlipidemia     Osteoarthritis     Type 2 diabetes mellitus with diabetic polyneuropathy, without long-term current use of insulin (HCC)     Type II or unspecified type diabetes mellitus without mention of complication, not stated as uncontrolled        CURRENT ALLERGIES: Altace [ramipril], Diclofenac sodium, Erythromycin, Naprosyn [naproxen], and Vesicare [solifenacin] REVIEW OF SYSTEMS: 14 systems were reviewed. Pertinent positives and negatives as above, all else negative. Past Surgical History:   Procedure Laterality Date    BELPHAROPTOSIS REPAIR Bilateral 1/9/2015    CARDIOVASCULAR STRESS TEST  4/2010    Abnormal    COLONOSCOPY  11/2009    Normal    CORONARY ARTERY BYPASS GRAFT  2010    CYSTOURETHROSCOPY  7/10/08    CYSTOURETHROSCOPY  10/92    EYE SURGERY  09/2013    bilateral cataracts    EYE SURGERY Bilateral 01/09/2015    PROSTATE SURGERY  10/92    biopsy    Social History:  Social History     Tobacco Use    Smoking status: Never Smoker    Smokeless tobacco: Never Used   Vaping Use    Vaping Use: Never used   Substance Use Topics    Alcohol use: Never    Drug use: No        CURRENT MEDICATIONS:  Outpatient Medications Marked as Taking for the 12/2/21 encounter [facility] Encounter)   Medication Sig Dispense Refill    glipiZIDE-metFORMIN (METAGLIP) 2.5-500 MG per tablet Take 1 tablet by mouth 3 times daily 270 tablet 3    oxybutynin (DITROPAN) 5 MG tablet 1 tab twice daily (Patient taking differently: Take 5 mg by mouth 2 times daily 1 tab twice daily ) 180 tablet 3    Insulin Glargine, 2 Unit Dial, (TOUJEO MAX SOLOSTAR) 300 UNIT/ML SOPN Inject 18 Units into the skin daily (Patient taking differently: Inject 20 Units into the skin daily ) 10 pen 3    rosuvastatin (CRESTOR) 20 MG tablet Take 1 tablet by mouth daily 90 tablet 3    celecoxib (CELEBREX) 200 MG capsule Take 1 capsule by mouth daily 90 capsule 3    amLODIPine (NORVASC) 5 MG tablet TAKE 1 TABLET DAILY (Patient taking differently: Take 5 mg by mouth daily ) 90 tablet 3    gabapentin (NEURONTIN) 600 MG tablet Take 1 tablet by mouth 2 times daily for 90 days. 180 tablet 3    Multiple Vitamins-Minerals (VISION FORMULA PO) Take 1 tablet by mouth daily      aspirin 81 MG EC tablet Take 81 mg by mouth daily. FAMILY HISTORY: family history includes Cancer in his father; Heart Disease in his father. PHYSICAL EXAM:   /70   Pulse 133   Temp 96.9 °F (36.1 °C) (Tympanic)   Resp 26   Ht 5' 10\" (1.778 m)   Wt 190 lb (86.2 kg)   SpO2 96%   BMI 27.26 kg/m²  Body mass index is 27.26 kg/m². Constitutional: He is oriented to person, place, and time. He appears well-developed and well-nourished. In no acute distress. HEENT: Normocephalic and atraumatic. No JVD present. Carotid bruit is not present. No mass and no thyromegaly present. No lymphadenopathy present. Cardiovascular: Tachycardic rate, irregularly irregular rhythm, normal heart sounds. Exam reveals no gallop and no friction rubs. 2/6 systolic murmur, 5th intercostal space on the LEFT in the mid-clavicular line (cardiac apex). Pulmonary/Chest: Effort normal and breath sounds normal. No respiratory distress. He has no wheezes, rhonchi or rales. Abdominal: Soft, non-tender. Bowel sounds and aorta are normal. He exhibits no organomegaly, mass or bruit. Extremities: Trace at the ankles. No cyanosis or clubbing. 2+ radial and carotid pulses. Distal extremity pulses: 2+ bilaterally. Neurological: He is alert and oriented to person, place, and time. No evidence of gross cranial nerve deficit. Coordination appeared normal.   Skin: Skin is warm and dry. There is no rash or diaphoresis. Psychiatric: He has a normal mood and affect.  His speech is normal and behavior is normal.      MOST RECENT LABS ON RECORD:   Lab Results   Component Value Date    WBC 8.3 12/02/2021    HGB 12.9 (L) 12/02/2021    HCT 40.0 (L) 12/02/2021     12/02/2021    CHOL 114 01/28/2019    TRIG 96 01/28/2019    HDL 37 (L) 07/09/2021    ALT 22 01/21/2015    AST 20 01/21/2015     12/02/2021    K 5.0 12/02/2021     12/02/2021    CREATININE 0.75 12/02/2021    BUN 21 12/02/2021    CO2 23 12/02/2021    TSH 2.62 12/02/2021    PSA 0.91 12/24/2012    GLUF 96 07/09/2021 LABA1C 6.8 (H) 07/09/2021    LABMICR 20 (H) 07/09/2021         ASSESSMENT:  Patient Active Problem List    Diagnosis Date Noted    New onset a-fib ([facility] 12/02/2021    A-fib ([facility] 12/02/2021    Type 2 diabetes mellitus with diabetic polyneuropathy, without long-term current use of insulin ([email or web address] 02/05/2019    ASHD (arteriosclerotic heart disease) 09/29/2015    Essential hypertension 09/29/2015    Mixed hyperlipidemia     BPH (benign prostatic hyperplasia) 01/09/2014    OAB (overactive bladder) 01/09/2014    Enlarged prostate 12/31/2012       PLAN:    · New Onset Atrial Fibrillation: Rate Control Symptomatic   Beta Blocker: START Metoprolol succinate (Toprol XL) 50 mg daily. I also discussed the potential side effects of this medication including lightheadedness and dizziness and instructed them to stop the medication of this occurs and call our office if this occurs. Metoprolol tartrate 25 mg q6 PRN HR>110   Calcium Channel Blocker: Stop cardizem drip   Anti-Arrhythmic: Not indicated. [id number] Score for Atrial Fibrillation Stroke Risk   Risk   Factors  Component Value   C CHF No 0   H HTN Yes 1   A2 Age >= 76 Yes,  (80 y.o.) 2   D DM Yes 1   S2 Prior Stroke/TIA No 0   V Vascular Disease No 0   A Age 74-69 No,  (80 y.o.) 0   Sc Sex male 0    [id number]  Score  4   Score last updated 12/2/21 9:72 PM EST    Click here for a link to the UpToDate guideline \"Atrial Fibrillation: Anticoagulation therapy to prevent embolization    Disclaimer: Risk Score calculation is dependent on accuracy of patient problem list and past encounter diagnosis.  Stroke Risk: CHADS2-VASc Score: 4/9 (4% stroke risk)   Anticoagulation: Continue Apixaban (Eliquis) 5 mg every 12 hours.     Discussed possible cardioversion and CAM patch    Additional Testing List: I ordered a echocardiogram to better assess for the etiology of this problem and to help guide future management     Acute on chronic diastolic heart failure: New York Heart Association Class: IV (Severe limitations, symptoms even at rest)  · Beta Blocker: START Metoprolol succinate (Toprol XL) 50 mg daily. I also discussed the potential side effects of this medication including lightheadedness and dizziness and instructed them to stop the medication of this occurs and call our office if this occurs. · ACE Inibitor/ARB: Not indicated at this time. · Diuretics: Start single dose of lasix 20 mg IV in ER and will re-assess response in AM  · Heart failure counseling: I told them to start wearing lower extremity compression stockings and I advised them to try and keep their legs up whenever possible and to limit salt in their diet. · Additional Testing List: I ordered a echocardiogram to better assess for the etiology of this problem and to help guide future management    Finally, I would like to have his BMP and magnesium levels repeated daily. Once again, thank you for allowing me to participate in this patients care. Please do not hesitate to contact me could I be of further assistance. Sincerely,  Pallavi Jeong. Pam HART, MS, F.A.C.C. [facility]    [address] [place] [address] Jeu De Paume, Taty Michael, [address] [place] [address]  Phone: [phone], Fax: [phone]     I believe that the risk of significant morbidity and mortality related to the patient's current medical conditions are: intermediate-high. The documentation recorded by the scribe, accurately and completely reflects the services I personally performed and the decisions made by me. Rose Clark MD, MS, F.A.C.C.  December 2, 2021

## 2021-12-02 NOTE — ED NOTES
Patient placed on 2 L NC for decreased SpO2 for elevated heart rate and elevated RR.      Karin Soto RN  12/02/21 1144

## 2021-12-02 NOTE — ED PROVIDER NOTES
[facility] [place] ED  EMERGENCY DEPARTMENT ENCOUNTER      Pt Name: Renetta Boucher  MRN: [id number]  [place] 9/28/1933  Date of evaluation: 12/2/2021  Provider: Marty Nuno MD    CHIEF COMPLAINT     Chief Complaint   Patient presents with    Atrial Fibrillation     New onset. Sent by Dr Fede Sue. Pt c/o shortness of breath         HISTORY OF PRESENT ILLNESS   (Location/Symptom, Timing/Onset, Context/Setting,Quality, Duration, Modifying Factors, Severity)  Note limiting factors. Renetta Boucher is a80 y.o. male who presents to the emergency department      This is a 80years old presented ER after having sent here by his primary care physician. He went to see his PCP due to the fact that started yesterday he felt short of breath and when he checked his blood pressure he knows his pulse was about 150. There is no previous history of A. fib however there is a history of CAD with CABG rough about 11 years ago. He denies any chest pain, dizziness or syncopal episode. He also denies any fever, chills, sweats. Nursing Notes werereviewed. REVIEW OF SYSTEMS    (2-9 systems for level 4, 10 or more for level 5)     Review of Systems   Constitutional: Negative. HENT: Negative. Eyes: Negative. Respiratory: Negative. Cardiovascular: Positive for palpitations. Negative for chest pain and leg swelling. Gastrointestinal: Negative. Endocrine: Negative. Genitourinary: Negative. Musculoskeletal: Negative. Skin: Negative. Neurological: Negative. Hematological: Negative. Psychiatric/Behavioral: Negative. Except as noted above the remainder of the review of systems was reviewed and negative.        PAST MEDICAL HISTORY     Past Medical History:   Diagnosis Date    ACE inhibitor-aggravated angioedema 2012    CAD (coronary artery disease)     Enlarged prostate     normal cystoscopy 7/2008 Dr. Nilesh Pan hypertension     History of Holter monitoring 2015 symptomatic PACs    Hx of echocardiogram 2015    normal    Hyperlipidemia     Osteoarthritis     Type 2 diabetes mellitus with diabetic polyneuropathy, without long-term current use of insulin (HCC)     Type II or unspecified type diabetes mellitus without mention of complication, not stated as uncontrolled          SURGICALHISTORY       Past Surgical History:   Procedure Laterality Date    BELPHAROPTOSIS REPAIR Bilateral 1/9/2015    CARDIOVASCULAR STRESS TEST  4/2010    Abnormal    COLONOSCOPY  11/2009    Normal    CORONARY ARTERY BYPASS GRAFT  2010    CYSTOURETHROSCOPY  7/10/08    CYSTOURETHROSCOPY  10/92    EYE SURGERY  09/2013    bilateral cataracts    EYE SURGERY Bilateral 01/09/2015    PROSTATE SURGERY  10/92    biopsy         CURRENT MEDICATIONS       Previous Medications    AMLODIPINE (NORVASC) 5 MG TABLET    TAKE 1 TABLET DAILY    ASPIRIN 81 MG EC TABLET    Take 81 mg by mouth daily. BLOOD GLUCOSE TEST STRIPS (ACCU-CHEK LALO PLUS) STRIP    1 each by Does not apply route daily    CELECOXIB (CELEBREX) 200 MG CAPSULE    Take 1 capsule by mouth daily    GABAPENTIN (NEURONTIN) 600 MG TABLET    Take 1 tablet by mouth 2 times daily for 90 days.     GLIPIZIDE-METFORMIN (METAGLIP) 2.5-500 MG PER TABLET    Take 1 tablet by mouth 3 times daily    HANDICAP PLACARD MISC    by Does not apply route Duration; 5 years    INSULIN GLARGINE, 2 UNIT DIAL, (TOUJEO MAX SOLOSTAR) 300 UNIT/ML SOPN    Inject 18 Units into the skin daily    INSULIN PEN NEEDLE ([employer] PEN NEEDLES) 32G X 4 MM MISC    1 each by Does not apply route daily    MULTIPLE VITAMINS-MINERALS (VISION FORMULA PO)    Take 1 tablet by mouth daily    OXYBUTYNIN (DITROPAN) 5 MG TABLET    1 tab twice daily    ROSUVASTATIN (CRESTOR) 20 MG TABLET    Take 1 tablet by mouth daily            Altace [ramipril], Diclofenac sodium, Erythromycin, Naprosyn [naproxen], and Vesicare [solifenacin]    FAMILY HISTORY       Family History   Problem Relation Age of Onset    Cancer Father     Heart Disease Father           SOCIAL HISTORY       Social History     Socioeconomic History    Marital status:      Spouse name: None    Number of children: None    Years of education: None    Highest education level: None   Occupational History    None   Tobacco Use    Smoking status: Never Smoker    Smokeless tobacco: Never Used   Vaping Use    Vaping Use: Never used   Substance and Sexual Activity    Alcohol use: Never    Drug use: No    Sexual activity: None   Other Topics Concern    None   Social History Narrative    None     Social Determinants of Health     Financial Resource Strain: Low Risk     Difficulty of Paying Living Expenses: Not hard at all   Food Insecurity: No Food Insecurity    Worried About Running Out of Food in the Last Year: Never true    Kristin of Food in the Last Year: Never true   Transportation Needs:     Lack of Transportation (Medical): Not on file    Lack of Transportation (Non-Medical): Not on file   Physical Activity: Unknown    Days of Exercise per Week: Patient refused    Minutes of Exercise per Session: Patient refused   Stress:     Feeling of Stress : Not on file   Social Connections: Socially Integrated    Frequency of Communication with Friends and Family: More than three times a week    Frequency of Social Gatherings with Friends and Family: Twice a week    Attends Buddhist Services: More than 4 times per year    Active Member of [address] [place] [address] [employer] or Organizations:  Yes    Attends Club or Organization Meetings: 1 to 4 times per year    Marital Status:    Intimate Partner Violence:     Fear of Current or Ex-Partner: Not on file    Emotionally Abused: Not on file    Physically Abused: Not on file    Sexually Abused: Not on file   Housing Stability:     Unable to Pay for Housing in the Last Year: Not on file    Number of [place] in the Last Year: Not on file    Unstable Housing in the Last Year: Not on file cardiologist.        RADIOLOGY:   plain film images such as CT, Ultrasound and MRI are read by the radiologist. Plain radiographic images are visualized and preliminarily interpreted by the emergency physician with the below findings:        Interpretation per the Radiologist below, ifavailable at the time of this note:    XR CHEST PORTABLE   Final Result   Subtle bilateral infiltrates concerning for infectious process. ED BEDSIDE ULTRASOUND:   Performed by ED Physician - none    LABS:  Labs Reviewed   BASIC METABOLIC PANEL - Abnormal; Notable for the following components:       Result Value    Glucose 159 (*)     Bun/Cre Ratio 28 (*)     All other components within normal limits   BRAIN NATRIURETIC PEPTIDE - Abnormal; Notable for the following components:    Pro-BNP 1,611 (*)     All other components within normal limits   CBC WITH AUTO DIFFERENTIAL - Abnormal; Notable for the following components:    Hemoglobin 12.9 (*)     Hematocrit 40.0 (*)     Lymphocytes 22 (*)     All other components within normal limits   COVID-19, RAPID   TROPONIN   TSH WITHOUT REFLEX   MAGNESIUM       All other labs were within normal range ornot returned as of this dictation. EMERGENCY DEPARTMENT COURSE and DIFFERENTIAL DIAGNOSIS/MDM:   Vitals:    Vitals:    12/02/21 1133 12/02/21 1139 12/02/21 1142 12/02/21 1143   BP:       Pulse: 123 107 100 133   Resp: 25 28 19 26   Temp:       TempSrc:       SpO2: 90% 91% 97% 96%   Weight:       Height:               MDM  Number of Diagnoses or Management Options  Diagnosis management comments: Patient is a [de-identified]years old with a history of CAD and CABG 10 years ago he is here today due to new onset palpitations along with shortness of breath. He is found to be in A. fib with RVR. Will initiate Cardizem drip and bolus and got his rate control he is currently below 100. We will keep him on Cardizem and initiate Eliquis for anticoagulation.   Discussed with  -cardiology will see the patient in consultation. I discussed with Dr. Nova Awad who accepts the patient in admission for further evaluation and treatment. Plan was explained to the patient who understands and agrees. CRITICAL CARE TIME   Total CriticalCare time was  minutes, excluding separately reportable procedures. There was a high probability of clinically significant/life threatening deterioration in the patient's condition which required my urgent intervention. CONSULTS:  None    PROCEDURES:  Unlessotherwise noted below, none     Procedures    FINAL IMPRESSION      1. Atrial fibrillation, new onset ([address]    2. Atrial fibrillation with RVR ([facility])          DISPOSITION/PLAN   DISPOSITION        PATIENT REFERRED TO:  No follow-up provider specified.     DISCHARGE MEDICATIONS:  New Prescriptions    No medications on file              (Please note that portions of this note were completed with a voice recognition program.  Efforts were made to edit the dictations but occasionally words are mis-transcribed.)      Ulises Joseph MD (electronically signed)  Attending Emergency Physician            Ulises Joseph MD  12/02/21 [id number]

## 2021-12-02 NOTE — ED NOTES
Bed:  01A  Expected date: 12/2/21  Expected time:   Means of arrival:   Comments:  Afib from Domenica Stanley RN  12/02/21 2815

## 2021-12-03 ENCOUNTER — APPOINTMENT (OUTPATIENT)
Dept: NON INVASIVE DIAGNOSTICS | Age: 86
DRG: 308 | End: 2021-12-03
Payer: MEDICARE

## 2021-12-03 VITALS
SYSTOLIC BLOOD PRESSURE: 118 MMHG | BODY MASS INDEX: 25.97 KG/M2 | OXYGEN SATURATION: 91 % | HEIGHT: 70 IN | RESPIRATION RATE: 20 BRPM | WEIGHT: 181.4 LBS | TEMPERATURE: 97.2 F | DIASTOLIC BLOOD PRESSURE: 50 MMHG | HEART RATE: 108 BPM

## 2021-12-03 PROBLEM — E44.0 MODERATE MALNUTRITION (HCC): Status: ACTIVE | Noted: 2021-12-03

## 2021-12-03 LAB
ABSOLUTE EOS #: 0.16 K/UL (ref 0–0.44)
ABSOLUTE IMMATURE GRANULOCYTE: <0.03 K/UL (ref 0–0.3)
ABSOLUTE LYMPH #: 1.49 K/UL (ref 1.1–3.7)
ABSOLUTE MONO #: 0.74 K/UL (ref 0.1–1.2)
ALBUMIN SERPL-MCNC: 4 G/DL (ref 3.5–5.2)
ALBUMIN/GLOBULIN RATIO: 1.7 (ref 1–2.5)
ALP BLD-CCNC: 77 U/L (ref 40–129)
ALT SERPL-CCNC: 41 U/L (ref 5–41)
ANION GAP SERPL CALCULATED.3IONS-SCNC: 10 MMOL/L (ref 9–17)
AST SERPL-CCNC: 19 U/L
BASOPHILS # BLD: 1 % (ref 0–2)
BASOPHILS ABSOLUTE: 0.07 K/UL (ref 0–0.2)
BILIRUB SERPL-MCNC: 0.81 MG/DL (ref 0.3–1.2)
BUN BLDV-MCNC: 20 MG/DL (ref 8–23)
BUN/CREAT BLD: 25 (ref 9–20)
CALCIUM SERPL-MCNC: 9.1 MG/DL (ref 8.6–10.4)
CHLORIDE BLD-SCNC: 102 MMOL/L (ref 98–107)
CO2: 27 MMOL/L (ref 20–31)
CREAT SERPL-MCNC: 0.81 MG/DL (ref 0.7–1.2)
DIFFERENTIAL TYPE: ABNORMAL
EOSINOPHILS RELATIVE PERCENT: 2 % (ref 1–4)
GFR AFRICAN AMERICAN: >60 ML/MIN
GFR NON-AFRICAN AMERICAN: >60 ML/MIN
GFR SERPL CREATININE-BSD FRML MDRD: ABNORMAL ML/MIN/{1.73_M2}
GFR SERPL CREATININE-BSD FRML MDRD: ABNORMAL ML/MIN/{1.73_M2}
GLUCOSE BLD-MCNC: 110 MG/DL (ref 70–99)
GLUCOSE BLD-MCNC: 116 MG/DL (ref 74–100)
GLUCOSE BLD-MCNC: 175 MG/DL (ref 74–100)
HCT VFR BLD CALC: 39.6 % (ref 40.7–50.3)
HEMOGLOBIN: 12.8 G/DL (ref 13–17)
IMMATURE GRANULOCYTES: 0 %
LYMPHOCYTES # BLD: 22 % (ref 24–43)
MCH RBC QN AUTO: 30.4 PG (ref 25.2–33.5)
MCHC RBC AUTO-ENTMCNC: 32.3 G/DL (ref 28.4–34.8)
MCV RBC AUTO: 94.1 FL (ref 82.6–102.9)
MONOCYTES # BLD: 11 % (ref 3–12)
NRBC AUTOMATED: 0 PER 100 WBC
PDW BLD-RTO: 14 % (ref 11.8–14.4)
PLATELET # BLD: 164 K/UL (ref 138–453)
PLATELET ESTIMATE: ABNORMAL
PMV BLD AUTO: 9.9 FL (ref 8.1–13.5)
POTASSIUM SERPL-SCNC: 4.3 MMOL/L (ref 3.7–5.3)
RBC # BLD: 4.21 M/UL (ref 4.21–5.77)
RBC # BLD: ABNORMAL 10*6/UL
SEG NEUTROPHILS: 64 % (ref 36–65)
SEGMENTED NEUTROPHILS ABSOLUTE COUNT: 4.27 K/UL (ref 1.5–8.1)
SODIUM BLD-SCNC: 139 MMOL/L (ref 135–144)
TOTAL PROTEIN: 6.3 G/DL (ref 6.4–8.3)
WBC # BLD: 6.8 K/UL (ref 3.5–11.3)
WBC # BLD: ABNORMAL 10*3/UL

## 2021-12-03 PROCEDURE — 82947 ASSAY GLUCOSE BLOOD QUANT: CPT

## 2021-12-03 PROCEDURE — 36415 COLL VENOUS BLD VENIPUNCTURE: CPT

## 2021-12-03 PROCEDURE — 85025 COMPLETE CBC W/AUTO DIFF WBC: CPT

## 2021-12-03 PROCEDURE — 6370000000 HC RX 637 (ALT 250 FOR IP): Performed by: INTERNAL MEDICINE

## 2021-12-03 PROCEDURE — 94761 N-INVAS EAR/PLS OXIMETRY MLT: CPT

## 2021-12-03 PROCEDURE — 2580000003 HC RX 258: Performed by: INTERNAL MEDICINE

## 2021-12-03 PROCEDURE — 6370000000 HC RX 637 (ALT 250 FOR IP): Performed by: FAMILY MEDICINE

## 2021-12-03 PROCEDURE — 2700000000 HC OXYGEN THERAPY PER DAY

## 2021-12-03 PROCEDURE — 80053 COMPREHEN METABOLIC PANEL: CPT

## 2021-12-03 PROCEDURE — 99223 1ST HOSP IP/OBS HIGH 75: CPT | Performed by: FAMILY MEDICINE

## 2021-12-03 RX ORDER — METOPROLOL SUCCINATE 100 MG/1
100 TABLET, EXTENDED RELEASE ORAL DAILY
Status: DISCONTINUED | OUTPATIENT
Start: 2021-12-03 | End: 2021-12-03 | Stop reason: HOSPADM

## 2021-12-03 RX ORDER — GLIPIZIDE 5 MG/1
2.5 TABLET ORAL
Status: DISCONTINUED | OUTPATIENT
Start: 2021-12-03 | End: 2021-12-03 | Stop reason: HOSPADM

## 2021-12-03 RX ORDER — DEXTROSE MONOHYDRATE 50 MG/ML
100 INJECTION, SOLUTION INTRAVENOUS PRN
Status: DISCONTINUED | OUTPATIENT
Start: 2021-12-03 | End: 2021-12-03 | Stop reason: HOSPADM

## 2021-12-03 RX ORDER — DEXTROSE MONOHYDRATE 25 G/50ML
12.5 INJECTION, SOLUTION INTRAVENOUS PRN
Status: DISCONTINUED | OUTPATIENT
Start: 2021-12-03 | End: 2021-12-03 | Stop reason: HOSPADM

## 2021-12-03 RX ORDER — NICOTINE POLACRILEX 4 MG
15 LOZENGE BUCCAL PRN
Status: DISCONTINUED | OUTPATIENT
Start: 2021-12-03 | End: 2021-12-03 | Stop reason: HOSPADM

## 2021-12-03 RX ORDER — INSULIN GLARGINE 100 [IU]/ML
14 INJECTION, SOLUTION SUBCUTANEOUS DAILY
Status: DISCONTINUED | OUTPATIENT
Start: 2021-12-03 | End: 2021-12-03 | Stop reason: HOSPADM

## 2021-12-03 RX ORDER — METOPROLOL SUCCINATE 100 MG/1
100 TABLET, EXTENDED RELEASE ORAL DAILY
Qty: 30 TABLET | Refills: 3 | Status: SHIPPED
Start: 2021-12-04 | End: 2021-12-22 | Stop reason: DRUGHIGH

## 2021-12-03 RX ADMIN — APIXABAN 5 MG: 5 TABLET, FILM COATED ORAL at 08:02

## 2021-12-03 RX ADMIN — METOPROLOL SUCCINATE 100 MG: 100 TABLET, EXTENDED RELEASE ORAL at 13:20

## 2021-12-03 RX ADMIN — METFORMIN HYDROCHLORIDE 500 MG: 500 TABLET ORAL at 08:00

## 2021-12-03 RX ADMIN — AMLODIPINE BESYLATE 5 MG: 5 TABLET ORAL at 08:02

## 2021-12-03 RX ADMIN — METFORMIN HYDROCHLORIDE 500 MG: 500 TABLET ORAL at 12:06

## 2021-12-03 RX ADMIN — INSULIN GLARGINE 14 UNITS: 100 INJECTION, SOLUTION SUBCUTANEOUS at 08:05

## 2021-12-03 RX ADMIN — ASPIRIN 81 MG: 81 TABLET, COATED ORAL at 08:02

## 2021-12-03 RX ADMIN — OXYBUTYNIN CHLORIDE 5 MG: 5 TABLET ORAL at 00:14

## 2021-12-03 RX ADMIN — INSULIN LISPRO 2 UNITS: 100 INJECTION, SOLUTION INTRAVENOUS; SUBCUTANEOUS at 12:09

## 2021-12-03 RX ADMIN — GLIPIZIDE 2.5 MG: 5 TABLET ORAL at 12:05

## 2021-12-03 RX ADMIN — METOPROLOL TARTRATE 50 MG: 50 TABLET, FILM COATED ORAL at 00:14

## 2021-12-03 RX ADMIN — OXYBUTYNIN CHLORIDE 5 MG: 5 TABLET ORAL at 08:00

## 2021-12-03 RX ADMIN — GABAPENTIN 600 MG: 300 CAPSULE ORAL at 00:14

## 2021-12-03 RX ADMIN — GABAPENTIN 600 MG: 300 CAPSULE ORAL at 08:00

## 2021-12-03 RX ADMIN — METOPROLOL TARTRATE 50 MG: 50 TABLET, FILM COATED ORAL at 08:04

## 2021-12-03 RX ADMIN — GLIPIZIDE 2.5 MG: 5 TABLET ORAL at 08:02

## 2021-12-03 RX ADMIN — SODIUM CHLORIDE, PRESERVATIVE FREE 10 ML: 5 INJECTION INTRAVENOUS at 08:09

## 2021-12-03 RX ADMIN — ROSUVASTATIN CALCIUM 20 MG: 20 TABLET, FILM COATED ORAL at 08:00

## 2021-12-03 NOTE — PROGRESS NOTES
Pablo Plants am scribing for and in the presence of Mary Billcase. Pam HART, MS, F.A.C.C. Patient: Nurys Middleton  : 1933  Date of Admission: 2021  Primary Care Physician: Kristy Zuluaga  Today's Date: 12/3/2021    REASON FOR CONSULTATION: Atrial Fibrillation (New onset. Sent by Dr Osito Melendez. Pt c/o shortness of breath)      HPI:  Mr. Cynthia Rothman is a 80 y.o. male who was admitted to the hospital for new onset atrial fibrillation. He has never had this in the past.     History of 3 vessel bypass in 2010. No heart attacks. No history of weakened heart in the past.     He reports following in the past with Dr. Addi Davila in [facility] but has not seen him in over 3 years. He reports it has been awhile since he's seen them. He reports he was breathing a little harder then normal the last couple days since Tuesday. He was sent by Kristy Zuluaga MD to ER. He was feeling a little more tired as well. Mr. Cynthia Rothman reports doing well today. He denied any current or recent chest pain, abdominal pain, bleeding problems, bowel issues, problems with his medications or any other concerns at this time. Past Medical History:   Diagnosis Date    ACE inhibitor-aggravated angioedema     Atrial fibrillation ([address] 2021    CAD (coronary artery disease)     Enlarged prostate     normal cystoscopy 2008 Dr. Rl Langley hypertension     History of Holter monitoring 2015    symptomatic PACs    Hx of echocardiogram 2015    normal    Hyperlipidemia     Osteoarthritis     Type 2 diabetes mellitus with diabetic polyneuropathy, without long-term current use of insulin (HCC)     Type II or unspecified type diabetes mellitus without mention of complication, not stated as uncontrolled        CURRENT ALLERGIES: Altace [ramipril], Diclofenac sodium, Erythromycin, Naprosyn [naproxen], and Vesicare [solifenacin] REVIEW OF SYSTEMS: 14 systems were reviewed.  Pertinent positives and negatives as above, all else negative. Past Surgical History:   Procedure Laterality Date    BELPHAROPTOSIS REPAIR Bilateral 1/9/2015    CARDIOVASCULAR STRESS TEST  4/2010    Abnormal    COLONOSCOPY  11/2009    Normal    CORONARY ARTERY BYPASS GRAFT  2010    CYSTOURETHROSCOPY  7/10/08    CYSTOURETHROSCOPY  10/92    EYE SURGERY  09/2013    bilateral cataracts    EYE SURGERY Bilateral 01/09/2015    PROSTATE SURGERY  10/92    biopsy    Social History:  Social History     Tobacco Use    Smoking status: Never Smoker    Smokeless tobacco: Never Used   Vaping Use    Vaping Use: Never used   Substance Use Topics    Alcohol use: Never    Drug use: No        CURRENT MEDICATIONS:  Outpatient Medications Marked as Taking for the 12/2/21 encounter [facility] Encounter)   Medication Sig Dispense Refill    glipiZIDE-metFORMIN (METAGLIP) 2.5-500 MG per tablet Take 1 tablet by mouth 3 times daily 270 tablet 3    oxybutynin (DITROPAN) 5 MG tablet 1 tab twice daily (Patient taking differently: Take 5 mg by mouth 2 times daily 1 tab twice daily ) 180 tablet 3    Insulin Glargine, 2 Unit Dial, (TOUJEO MAX SOLOSTAR) 300 UNIT/ML SOPN Inject 18 Units into the skin daily (Patient taking differently: Inject 20 Units into the skin daily ) 10 pen 3    rosuvastatin (CRESTOR) 20 MG tablet Take 1 tablet by mouth daily 90 tablet 3    celecoxib (CELEBREX) 200 MG capsule Take 1 capsule by mouth daily 90 capsule 3    amLODIPine (NORVASC) 5 MG tablet TAKE 1 TABLET DAILY (Patient taking differently: Take 5 mg by mouth daily ) 90 tablet 3    gabapentin (NEURONTIN) 600 MG tablet Take 1 tablet by mouth 2 times daily for 90 days. 180 tablet 3    Multiple Vitamins-Minerals (VISION FORMULA PO) Take 1 tablet by mouth daily      aspirin 81 MG EC tablet Take 81 mg by mouth daily. FAMILY HISTORY: family history includes Cancer in his father; Heart Disease in his father.      PHYSICAL EXAM:   /78   Pulse 84   Temp 96.8 °F (36 °C) (Temporal)   Resp 21   Ht 5' 10\" (1.778 m)   Wt 181 lb 6.4 oz (82.3 kg)   SpO2 91%   BMI 26.03 kg/m²  Body mass index is 26.03 kg/m². Constitutional: He is oriented to person, place, and time. He appears well-developed and well-nourished. In no acute distress. HEENT: Normocephalic and atraumatic. No JVD present. Carotid bruit is not present. No mass and no thyromegaly present. No lymphadenopathy present. Cardiovascular: Normal rate, irregularly irregular rhythm, normal heart sounds. Exam reveals no gallop and no friction rubs. 2/6 systolic murmur, 5th intercostal space on the LEFT in the mid-clavicular line (cardiac apex). Pulmonary/Chest: Effort normal and breath sounds normal. No respiratory distress. He has no wheezes, rhonchi or rales. Abdominal: Soft, non-tender. Bowel sounds and aorta are normal. He exhibits no organomegaly, mass or bruit. Extremities: Trace at the ankles. No cyanosis or clubbing. 2+ radial and carotid pulses. Distal extremity pulses: 2+ bilaterally. Neurological: He is alert and oriented to person, place, and time. No evidence of gross cranial nerve deficit. Coordination appeared normal.   Skin: Skin is warm and dry. There is no rash or diaphoresis. Psychiatric: He has a normal mood and affect.  His speech is normal and behavior is normal.      MOST RECENT LABS ON RECORD:   Lab Results   Component Value Date    WBC 6.8 12/03/2021    HGB 12.8 (L) 12/03/2021    HCT 39.6 (L) 12/03/2021     12/03/2021    CHOL 114 01/28/2019    TRIG 96 01/28/2019    HDL 37 (L) 07/09/2021    ALT 41 12/03/2021    AST 19 12/03/2021     12/03/2021    K 4.3 12/03/2021     12/03/2021    CREATININE 0.81 12/03/2021    BUN 20 12/03/2021    CO2 27 12/03/2021    TSH 2.62 12/02/2021    PSA 0.91 12/24/2012    GLUF 96 07/09/2021    LABA1C 6.8 (H) 07/09/2021    LABMICR 20 (H) 07/09/2021         ASSESSMENT:  Patient Active Problem List    Diagnosis Date Noted    Acute on chronic combined systolic and diastolic CHF, NYHA class 4 (HCC)     Moderate malnutrition ([facility] 12/03/2021    New onset a-fib ([facility] 12/02/2021    Atrial fibrillation, new onset ([facility] 12/02/2021    Type 2 diabetes mellitus with diabetic polyneuropathy, without long-term current use of insulin ([facility] 02/05/2019    ASHD (arteriosclerotic heart disease) 09/29/2015    Essential hypertension 09/29/2015    Mixed hyperlipidemia     BPH (benign prostatic hyperplasia) 01/09/2014    OAB (overactive bladder) 01/09/2014    Enlarged prostate 12/31/2012       PLAN:  · New Onset Atrial Fibrillation: Rate Control Symptomatic. We did discuss the possible cause of why he went into atrial fibrillation. He does have a history of bypass surgery in the past. I will order an out patient stress test for him to get done in a week and then I will see him back in my office in about 2-3 weeks.  Beta Blocker: STOP metoprolol tartrate (Lopressor) and START Metoprolol succinate (Toprol XL) 100 mg daily. I also discussed the potential side effects of this medication including lightheadedness and dizziness and instructed them to stop the medication of this occurs and call our office if this occurs. [id number] Score for Atrial Fibrillation Stroke Risk    Risk   Factors  Component Value   C CHF Yes 1   H HTN Yes 1   A2 Age >= 76 Yes,  (80 y.o.) 2   D DM Yes 1   S2 Prior Stroke/TIA No 0   V Vascular Disease No 0   A Age 74-69 No,  (80 y.o.) 0   Sc Sex male 0    [id number]  Score  5   Score last updated 38/7/93 7:20 PM EST  Click here for a link to the UpToDate guideline \"Atrial Fibrillation: Anticoagulation therapy to prevent embolization  Disclaimer: Risk Score calculation is dependent on accuracy of patient problem list and past encounter diagnosis.  Stroke Risk: CHADS2-VASc Score: 5/9 (6.7% stroke risk)   Anticoagulation: Continue Apixaban (Eliquis) 5 mg every 12 hours.  We will give him a one month free card and also some samples before he leaves.  Additional Testing List: Additional Testing List: Because of current signs and symptoms which can certainly be caused by significant coronary artery disease, I ordered a treadmill stress test with SPECT imaging to try and rule out this possibility. Chronic diastolic heart failure: New York Heart Association Class: IV (Severe limitations, symptoms even at rest)  · Beta Blocker: STOP metoprolol tartrate (Lopressor) and START Metoprolol succinate (Toprol XL) 100 mg daily. · Diuretics: We will plan on discharging him home today. He is doing well at this time. He is looking and feeling good. I will have him follow up with me in the office in 2-3 weeks to assess how he is doing then. · Heart failure counseling: I advised them to try and keep their legs up whenever possible and to limit salt in their diet.  Atherosclerotic Heart Disease: History of open heart surgery.  Antiplatelet Agent: Continue Aspirin 81 mg daily.  Beta Blocker: STOP metoprolol tartrate (Lopressor) and START Metoprolol succinate (Toprol XL) 100 mg daily.  Cholesterol Reduction Therapy: Continue rosuvastatin (Crestor) 20 mg daily. Sincerely,  King Deyanira Orozco MD, MS, F.A.C.C. [facility] Cardiology Specialist    [address] [place] [address]  Phone: [phone], Fax: [phone]      I believe that the risk of significant morbidity and mortality related to the patient's current medical conditions are: intermediate-high. The documentation recorded by the scribe, accurately and completely reflects the services I personally performed and the decisions made by me. Kwan Alvarenga MD, MS, F.A.C.C.  December 3, 2021

## 2021-12-03 NOTE — PROGRESS NOTES
Patient discharged home prior to [facility] visit.     [address], [place]  12/3/2021

## 2021-12-03 NOTE — PROGRESS NOTES
Pt resting in bed with eyes closed. Pt awakens easily. Vitals and assessment as charted. Pt denies pain. Pt denies any further needs at this time. Will continue to monitor. Call light within reach. Bed alarm on.

## 2021-12-03 NOTE — H&P
ICU Admission [facility] Medicine  History and Physical    Patient:  Angelica Bennett  MRN: [id number]    Chief Complaint: Shortness of breath    History Obtained From:  patient, electronic medical record    PCP: Bette Figueroa MD    History of Present Illness: The patient is a 80 y.o. male who scented to the emergency room from his PCPs office due to complaints of shortness of breath and tachycardia. Patient has no history of atrial fibrillation. He does have a history of CAD with a CABG approximately 11 years ago. Denies chest pain or palpitations. Denies dizziness or syncope. Recent illness including fever chills. Patient is Covid vaccinated. His evaluation is proBNP was 1611. Patient was found to be in atrial fib with RVR with a heart rate of 140. Patient was started on Cardizem drip bolus and was started on Eliquis for anticoagulation. Conversations took place with Dr. Maria M Lucas for cardiology support patient was admitted.     Past Medical History:        Diagnosis Date    ACE inhibitor-aggravated angioedema 2012    Atrial fibrillation ([address] 12/02/2021    CAD (coronary artery disease)     Enlarged prostate     normal cystoscopy 7/2008 Dr. Abbi Samuels hypertension     History of Holter monitoring 2015    symptomatic PACs    Hx of echocardiogram 2015    normal    Hyperlipidemia     Osteoarthritis     Type 2 diabetes mellitus with diabetic polyneuropathy, without long-term current use of insulin ([address]     Type II or unspecified type diabetes mellitus without mention of complication, not stated as uncontrolled        Past Surgical History:        Procedure Laterality Date    BELPHAROPTOSIS REPAIR Bilateral 1/9/2015    CARDIOVASCULAR STRESS TEST  4/2010    Abnormal    COLONOSCOPY  11/2009    Normal    CORONARY ARTERY BYPASS GRAFT  2010    CYSTOURETHROSCOPY  7/10/08    CYSTOURETHROSCOPY  10/92    EYE SURGERY  09/2013    bilateral cataracts    EYE SURGERY Bilateral 01/09/2015    PROSTATE SURGERY  10/92    biopsy       Medications Prior to Admission:    Prior to Admission medications    Medication Sig Start Date End Date Taking? Authorizing Provider   glipiZIDE-metFORMIN (METAGLIP) 2.5-500 MG per tablet Take 1 tablet by mouth 3 times daily 7/19/21 12/2/21 Yes Riki Cobb MD   oxybutynin (DITROPAN) 5 MG tablet 1 tab twice daily  Patient taking differently: Take 5 mg by mouth 2 times daily 1 tab twice daily  7/19/21  Yes Riki Cobb MD   Insulin Glargine, 2 Unit Dial, (TOUJEO MAX SOLOSTAR) 300 UNIT/ML SOPN Inject 18 Units into the skin daily  Patient taking differently: Inject 20 Units into the skin daily  7/19/21  Yes Riki Cobb MD   rosuvastatin (CRESTOR) 20 MG tablet Take 1 tablet by mouth daily 1/27/21  Yes Riki Cobb MD   celecoxib (CELEBREX) 200 MG capsule Take 1 capsule by mouth daily 1/27/21  Yes Riik Cobb MD   amLODIPine (NORVASC) 5 MG tablet TAKE 1 TABLET DAILY  Patient taking differently: Take 5 mg by mouth daily  12/18/20  Yes Riki Cobb MD   gabapentin (NEURONTIN) 600 MG tablet Take 1 tablet by mouth 2 times daily for 90 days. 11/20/20 12/2/21 Yes Riki Cobb MD   Multiple Vitamins-Minerals (VISION FORMULA PO) Take 1 tablet by mouth daily   Yes Historical Provider, MD   aspirin 81 MG EC tablet Take 81 mg by mouth daily. Yes Historical Provider, MD   Insulin Pen Needle ([employer] PEN NEEDLES) 32G X 4 MM MISC 1 each by Does not apply route daily 7/19/21   Riki Cobb MD   blood glucose test strips (ACCU-CHEK LALO PLUS) strip 1 each by Does not apply route daily 7/9/21   Riki Cobb MD   Handicap Placard MISC by Does not apply route Duration; 5 years 10/14/20   Riki Cobb MD       Allergies:  Altace [ramipril], Diclofenac sodium, Erythromycin, Naprosyn [naproxen], and Vesicare [solifenacin]    Social History:   TOBACCO:   reports that he has never smoked.  He has never used smokeless tobacco.  ETOH: reports no history of alcohol use. Family History:       Problem Relation Age of Onset    Cancer Father     Heart Disease Father        Allergies:  Altace [ramipril], Diclofenac sodium, Erythromycin, Naprosyn [naproxen], and Vesicare [solifenacin]    Medications Prior to Admission:    Prior to Admission medications    Medication Sig Start Date End Date Taking? Authorizing Provider   glipiZIDE-metFORMIN (METAGLIP) 2.5-500 MG per tablet Take 1 tablet by mouth 3 times daily 7/19/21 12/2/21 Yes Tae Centeno MD   oxybutynin (DITROPAN) 5 MG tablet 1 tab twice daily  Patient taking differently: Take 5 mg by mouth 2 times daily 1 tab twice daily  7/19/21  Yes Tae Centeno MD   Insulin Glargine, 2 Unit Dial, (TOUJEO MAX SOLOSTAR) 300 UNIT/ML SOPN Inject 18 Units into the skin daily  Patient taking differently: Inject 20 Units into the skin daily  7/19/21  Yes Tae Centeno MD   rosuvastatin (CRESTOR) 20 MG tablet Take 1 tablet by mouth daily 1/27/21  Yes Tae Centeno MD   celecoxib (CELEBREX) 200 MG capsule Take 1 capsule by mouth daily 1/27/21  Yes Tae Centeno MD   amLODIPine (NORVASC) 5 MG tablet TAKE 1 TABLET DAILY  Patient taking differently: Take 5 mg by mouth daily  12/18/20  Yes Tae Centeno MD   gabapentin (NEURONTIN) 600 MG tablet Take 1 tablet by mouth 2 times daily for 90 days. 11/20/20 12/2/21 Yes Tae Centeno MD   Multiple Vitamins-Minerals (VISION FORMULA PO) Take 1 tablet by mouth daily   Yes Historical Provider, MD   aspirin 81 MG EC tablet Take 81 mg by mouth daily.      Yes Historical Provider, MD   Insulin Pen Needle ([employer] PEN NEEDLES) 32G X 4 MM MISC 1 each by Does not apply route daily 7/19/21   Tae Centeno MD   blood glucose test strips (ACCU-CHEK LALO PLUS) strip 1 each by Does not apply route daily 7/9/21   Tae Centeno MD   Handscottp Placard MISC by Does not apply route Duration; 5 years 10/14/20   Tae Centeno MD       Review of Systems:  Constitutional:negative  for fevers, and negative for chills. Eyes: negative for visual disturbance   ENT: negative for sore throat, negative nasal congestion, and negative for earache  Respiratory: positive for shortness of breath, negative for cough, and negative for wheezing  Cardiovascular: negative for chest pain, negative for palpitations, and negative for syncope  Gastrointestinal: negative for abdominal pain, negative for nausea,negative for vomiting, negative for diarrhea, negative for constipation, and negative for hematochezia or melena  Genitourinary: negative for dysuria, negative for urinary urgency, negative for urinary frequency, and negative for hematuria  Skin: negative for skin rash, and negative for skin lesions  Neurological: negative for unilateral weakness, numbness or tingling. Physical Exam:    Vitals:    Temp: 97.2 °F (36.2 °C)  BP: 128/79  Resp: 17  Pulse: 124  SpO2: 92 % on room air  SpO2 range:   SpO2  Av.1 %  Min: 89 %  Max: 96 %    Exam:  GEN:    Awake, alert and oriented x3. EYES:  EOMI, pupils equal   NECK: Supple. No lymphadenopathy. No carotid bruit  CVS:    irregularly irregular, no audible murmur  PULM:  diminished with scattered fine crackles throughout both lung fields, no acute respiratory distress   ABD:    Bowels sounds normal.  Abdomen is soft. No distention. no tenderness to palpation. EXT:   no edema bilaterally . No calf tenderness. NEURO: Moves all extremities. Motor and sensory are grossly intact  SKIN:  No rashes.   No skin lesions.    -----------------------------------------------------------------  Diagnostic Data:     CBC:   Lab Results   Component Value Date    WBC 6.8 2021    RBC 4.21 2021    HGB 12.8 (L) 2021    HCT 39.6 (L) 2021    MCV 94.1 2021     2021      Lab Results   Component Value Date    SEGS 64 2021    NEUTROABS 4.27 2021    LYMPHOPCT 22 (L) 2021    LYMPHSABS 1.49 12/03/2021    MONOPCT 11 12/03/2021    EOSRELPCT 2 12/03/2021    BASOPCT 1 12/03/2021    IMMGRAN 0 12/03/2021     CMP:   Lab Results   Component Value Date    GLUCOSE 110 (H) 12/03/2021    BUN 20 12/03/2021    CREATININE 0.81 12/03/2021     12/03/2021    K 4.3 12/03/2021    CALCIUM 9.1 12/03/2021     12/03/2021    CO2 27 12/03/2021    PROT 6.3 (L) 12/03/2021    LABALBU 4.0 12/03/2021    BILITOT 0.81 12/03/2021    ALKPHOS 77 12/03/2021    ALT 41 12/03/2021    AST 19 12/03/2021     UA:   Lab Results   Component Value Date    COLORU YELLOW 08/12/2017    SPECGRAV 1.025 (H) 08/12/2017    WBCUA 0 TO 2 08/12/2017    RBCUA 0 TO 2 08/12/2017    EPITHUA 0 TO 2 08/12/2017    LEUKOCYTESUR NEGATIVE 08/12/2017    GLUCOSEU NEGATIVE 08/12/2017    KETUA TRACE (A) 08/12/2017    PROTEINU NEGATIVE 08/12/2017    HGBUR NEGATIVE 08/12/2017    CASTUA NOT REPORTED 08/12/2017    CRYSTUA NOT REPORTED 08/12/2017    BACTERIA NOT REPORTED 08/12/2017    YEAST NOT REPORTED 08/12/2017     Lactic Acid:   No results found for: LACTA  D-Dimer:  No results found for: DDIMER  PT/INR:  No results found for: PROTIME, INR  Troponin:  No results for input(s): TROPONINI in the last 72 hours. ABGs:   No results found for: PHART, PH, SBB4PCM, PCO2, PO2ART, PO2, BKG9AUB, HCO3, BEART, BE, THGBART, THB, NWA7PCB, B2MNPEQF, O2SAT, FIO2      EKG reviewed: my interpretation is: atrial fibrillation, rate 140        Imaging Data:  XR CHEST PORTABLE   Final Result   Subtle bilateral infiltrates concerning for infectious process. Assessment:    Active Problems:    Acute on chronic combined systolic and diastolic CHF, NYHA class 4 (HCC)    Moderate malnutrition (HCC)    Atrial fibrillation, new onset ([address]  Resolved Problems:    * No resolved hospital problems.  *      Patient Active Problem List    Diagnosis Date Noted    Acute on chronic combined systolic and diastolic CHF, NYHA class 4 (HCC)     Moderate malnutrition ([facility] 12/03/2021  New onset [facility]fib [facility] 12/02/2021    Atrial fibrillation, new onset ([facility] 12/02/2021    Type 2 diabetes mellitus with diabetic polyneuropathy, without long-term current use of insulin ([facility] 02/05/2019    ASHD (arteriosclerotic heart disease) 09/29/2015    Essential hypertension 09/29/2015    Mixed hyperlipidemia     BPH (benign prostatic hyperplasia) 01/09/2014    OAB (overactive bladder) 01/09/2014    Enlarged prostate 12/31/2012       Plan:     · This patient requires inpatient admission because of Atrial fibrillation with RVR  · Estimated length of stay is 2 days  · Discussed patient's symptoms and data results including labs and imaging studies with the ER MD at time of admission  · Imaging: all imaging studies reviewed   · Vasopressors: not indicated at this time   · Consults: Cardiology   · Off Cardizem drip  · Continue Eliquis  · Continue Toprol-XL  · Essential hypertension  · Continue amlodipine  · Type 2 diabetes  · Continue glipizide/Metformin, Lantus  · POCT before meals and at bedtime  · Medium dose sliding scale   · hypoglycemia protocol  · ASHD  · Continue aspirin, Crestor  · DVT prophylaxis: already on chronic anticoagulation  · Peptic ulcer prophylaxis: Pepcid  · High risk medications: none  · Social Service and Case Management consults for DC planning  · Dietician consult initiated  · Disposition:  Discharge plan is home      CORE MEASURES  DVT prophylaxis: already on chronic anticoagulation  Decubitus ulcer present on admission: No  CODE STATUS: FULL CODE  Nutrition Status: good   Physical therapy: No   Old Charts reviewed: Yes  EKG Reviewed:  Yes  Advance Directive Addressed: Yes    SAGAR Maciel CNP, SAGAR, NP-C  12/3/2021, 2:13 PM

## 2021-12-03 NOTE — PROGRESS NOTES
Comprehensive Nutrition Assessment    Type and Reason for Visit:  Initial (missing malnutrition screen )    Nutrition Recommendations/Plan:   1. Continue current diet. 2. Declined low sodium nutrition therapy education, has prior knowledge. Nutrition Assessment:  Moderate malnutrition r/t inadequate oral intakes aeb mild fat/muscle losses. Altered nutrition related labs r/t cardiac dysfunction aeb BNP 1611. Pt admitted with CHF and A Fib. A1c 6.8, good glycemic control with advanced age. Pt states he has prior knowledge of low sodium diet and declined diet information. States sometimes he notices his blood sugar goes low after PA, encouraged snack prior to PA. States PO has been down due to \"stomach feeling amador. \" Pt provided crackers and peanut butter for HS snack. States he felt like he needed something, encouraged to ask staff for a snack if he needs something. States homemade soups at home, denied adding salt or using convenience foods with hidden sodium. Malnutrition Assessment:  Malnutrition Status: Moderate malnutrition    Context:  Acute Illness     Findings of the 6 clinical characteristics of malnutrition:  Energy Intake:  No significant decrease in energy intake  Weight Loss:  7 - Greater than 2% over 1 week (5% x 1 day, CHF)     Body Fat Loss:  1 - Mild body fat loss Fat Overlying Ribs   Muscle Mass Loss:  1 - Mild muscle mass loss Clavicles (pectoralis & deltoids)  Fluid Accumulation:  No significant fluid accumulation     Strength:  Not Performed    Estimated Daily Nutrient Needs:  Energy (kcal):  8732-8549 (20-23/kg); Weight Used for Energy Requirements:  Current     Protein (g):  98-113g (1.3-1.5g/kg); Weight Used for Protein Requirements:  Ideal        Fluid (ml/day):  1640 ml (20/kg); Method Used for Fluid Requirements:  ml/Kg      Nutrition Related Findings:  mild fat/muscle losses       Wounds:  None       Current Nutrition Therapies:    ADULT DIET;  Regular; 4 carb choices (60 gm/meal); Low Sodium (2 gm)    Anthropometric Measures:  · Height: 5' 10\" (177.8 cm)  · Current Body Weight: 181 lb 6.4 oz (82.3 kg)   · Admission Body Weight: 181 lb (82.1 kg)    · Usual Body Weight: 183 lb (83 kg)     · Ideal Body Weight: 166 lbs; % Ideal Body Weight 109.3 %   · BMI: 26  · BMI Categories: Overweight (BMI 25.0-29. 9)       Nutrition Diagnosis:   · Altered nutrition-related lab values related to cardiac dysfunction as evidenced by lab values      Nutrition Interventions:   Food and/or Nutrient Delivery:  Continue Current Diet  Nutrition Education/Counseling:  Education declined   Coordination of Nutrition Care:  Continue to monitor while inpatient    Goals:  PO > 75% of meals with limited sodium < 2,000 mg per day       Lab Results   Component Value Date    LABA1C 6.8 07/09/2021     Lab Results   Component Value Date    TRIG 96 01/28/2019    HDL 37 07/09/2021     Recent Labs     12/03/21  0011   POCGLU 116*     Recent Labs     12/02/21  1102 12/03/21  0520    139   K 5.0 4.3    102   CO2 23 27   BUN 21 20   CREATININE 0.75 0.81   GLUCOSE 159* 110*   ALT  --  41   ALKPHOS  --  77   GFR                            Lab Results   Component Value Date    LABALBU 4.0 12/03/2021      Nutrition Monitoring and Evaluation:   Behavioral-Environmental Outcomes:  None Identified   Food/Nutrient Intake Outcomes:  Food and Nutrient Intake  Physical Signs/Symptoms Outcomes:  Biochemical Data, Weight     Discharge Planning:    Continue current diet     Electronically signed by Rebecca Burk RD, JENNIFER on 12/3/21 at 10:26 AM EST    Contact: [phone]

## 2021-12-03 NOTE — DISCHARGE SUMMARY
Discharge Summary    Deandre Vela  :  1933  MRN:  [id number]    Admit date:  2021      Discharge date: 12/3/2021     Admitting Physician:  Riki Cobb MD    Discharge Diagnoses: Active Problems:    Acute on chronic combined systolic and diastolic CHF, NYHA class 4 (HCC)    Moderate malnutrition (HCC)    Atrial fibrillation, new onset ([address]  Resolved Problems:    * No resolved hospital problems. *      Hospital Course:   Deandre Vela is a 80 y.o. male admitted with new onset atrial fibrillation. He presented to the emergency room from his PCPs office due to complaints of shortness of breath and tachycardia. Patient has no history of atrial fibrillation. He does have a history of CAD with a CABG approximately 11 years ago. Denies chest pain or palpitations. Denies dizziness or syncope. Recent illness including fever chills. Patient is Covid vaccinated. His evaluation is proBNP was 1611. Patient was found to be in atrial fib with RVR with a heart rate of 140. Patient was started on Cardizem drip bolus and was started on Eliquis for anticoagulation. Conversations took place with Dr. Miguel Pagna for cardiology support patient was admitted. Patient was evaluated by Dr. Poli Odell on admission today. Patient's heart rate is controlled. He is ambulating in the greco without difficulty. Conversations took place with cardiology and he is cleared for discharge. His echo was stable. He will follow-up with cardiology in 3 weeks    Consultants:  Dr. Miguel Pagan, cardiology    Procedures: none    Complications: none    Discharge Condition: fair    Exam:  GEN:    Awake, alert and oriented x3. EYES:   EOMI, pupils equal   NECK: Supple. No lymphadenopathy. No carotid bruit  CVS:     irregularly irregular, no audible murmur  PULM:  diminished with scattered fine crackles throughout both lung fields, no acute respiratory distress   ABD:     Bowels sounds normal.  Abdomen is soft. No distention.   no tenderness to palpation. EXT:     no edema bilaterally . No calf tenderness. NEURO: Moves all extremities. Motor and sensory are grossly intact  SKIN:    No rashes.   No skin lesions    Significant Diagnostic Studies:   Lab Results   Component Value Date    WBC 6.8 12/03/2021    HGB 12.8 (L) 12/03/2021     12/03/2021       Lab Results   Component Value Date    BUN 20 12/03/2021    CREATININE 0.81 12/03/2021     12/03/2021    K 4.3 12/03/2021    CALCIUM 9.1 12/03/2021     12/03/2021    CO2 27 12/03/2021    LABGLOM >60 12/03/2021       Lab Results   Component Value Date    WBCUA 0 TO 2 08/12/2017    RBCUA 0 TO 2 08/12/2017    EPITHUA 0 TO 2 08/12/2017    LEUKOCYTESUR NEGATIVE 08/12/2017    SPECGRAV 1.025 (H) 08/12/2017    GLUCOSEU NEGATIVE 08/12/2017    KETUA TRACE (A) 08/12/2017    PROTEINU NEGATIVE 08/12/2017    HGBUR NEGATIVE 08/12/2017    CASTUA NOT REPORTED 08/12/2017    CRYSTUA NOT REPORTED 08/12/2017    BACTERIA NOT REPORTED 08/12/2017    YEAST NOT REPORTED 08/12/2017       Echocardiogram complete 2D with doppler with color    Result Date: 12/2/2021  [facility] Transthoracic Echocardiography Report (TTE)  Patient Name Otf Breen    Date of Study               12/02/2021               Aria Max   Date of      09/28/1933  Gender                      Male  Birth   Age          80 year(s)  Race                           Room Number  01A         Height:                     70 inch, 177.8 cm   Corporate ID [id number]    Weight:                     190 pounds, 86.2 kg  #   Patient Acct [de-identified]   BSA:          2.04 m^2      BMI:      27.26  #                                                              kg/m^2   MR #         [id number]      Sonographer                 Work,Bettye   Accession #  [id number]  Interpreting Physician      Mir Valdez   Fellow                   Referring Nurse                           Practitioner   Interpreting             Referring Physician Wendolyn Barthel, PA Fellow  Type of Study   TTE procedure:2D Echocardiogram, M-Mode, Doppler, Color Doppler. Procedure Date Date: 12/02/2021 Start: 04:29 PM Study Location: [facility] Indications:Atrial fibrillation. History / Tech. Comments: Dx: new onset atrial fibrillation Dx: HTN, hyperlipidemia, DM Patient Status: ED Height: 70 inches Weight: 190 pounds BSA: 2.04 m^2 BMI: 27.26 kg/m^2 CONCLUSIONS Summary Global left ventricular systolic function is difficult to assess due to beat to beat variability from atrial fibrillation but appears preserved with estimated ejection fraction of 50-55%. Normal left ventricular cavity size with moderately increased left ventricular wall thickness. No definite specific wall motion abnormalities were identified. Severe bi-atrial enlargement. Right ventricular dilatation with normal systolic function. Mild aortic stenosis with peak and mean gradient of 17 and 10 mmHg although it looks worse on 2D imaging. Mitral annular calcification is seen with a mean gradient of 4.2 mmHg consistent with mild calcific mitral stenosis. Mild to moderate mitral regurgitation was seen. Moderate to severe tricuspid regurgitation. Moderate pulmonary hypertension with an estimated right ventricular systolic pressure of 57 mmHg. Diastolic function cannot be properly assessed due to atrial fibrillation. Signature ----------------------------------------------------------------------------  Electronically signed by Rishi CarlosSonographer) on 12/02/2021 05:18 PM ---------------------------------------------------------------------------- ----------------------------------------------------------------------------  Electronically signed by Mir Valdez(Interpreting physician) on 12/02/2021  05:48 PM ---------------------------------------------------------------------------- FINDINGS Left Atrium The left atrium is severely dilated (>40) with a left atrial volume index of 57 ml/m2.  Left Ventricle Mean Gradient: 9.81 mmHg  Mean Gradient: 4.19 mmHg  P1/2t: 60.63 msec                       Acceleration Time: 60.78 msec                                          Area (continuity): 1.87 cm^2                                          AV VTI: 17.39 cm   Tricuspid:                              Pulmonic:   Estimated RVSP: 57.51 mmHg  Peak TR Velocity: 3.26 m/s  Peak TR Gradient: 42.5104 mmHg  Estimated RA Pressure: 15 mmHg                                          Estimated PASP: 57.51 mmHg  Diastology / Tissue Doppler Lateral Wall E' velocity:0.11 m/s Lateral Wall E/E':13.78    XR CHEST PORTABLE    Result Date: 12/2/2021  EXAMINATION: ONE XRAY VIEW OF THE CHEST 12/2/2021 11:04 am COMPARISON: Chest radiograph performed 04/14/2014. HISTORY: ORDERING SYSTEM PROVIDED HISTORY: dyspnea TECHNOLOGIST PROVIDED HISTORY: dyspnea FINDINGS: There are subtle bilateral infiltrates. There is no effusion. There is no pneumothorax. The mediastinal structures are unremarkable. The upper abdomen unremarkable. The extrathoracic soft tissues are unremarkable. Subtle bilateral infiltrates concerning for infectious process.        Assessment and Plan:  Patient Active Problem List    Diagnosis Date Noted    Acute on chronic combined systolic and diastolic CHF, NYHA class 4 ([address]     Moderate malnutrition ([address] 12/03/2021    New onset a-fib ([address] 12/02/2021    Atrial fibrillation, new onset ([address] 12/02/2021    Type 2 diabetes mellitus with diabetic polyneuropathy, without long-term current use of insulin ([address] 02/05/2019    ASHD (arteriosclerotic heart disease) 09/29/2015    Essential hypertension 09/29/2015    Mixed hyperlipidemia     BPH (benign prostatic hyperplasia) 01/09/2014    OAB (overactive bladder) 01/09/2014    Enlarged prostate 12/31/2012        Discharge Medications:         Medication List      START taking these medications    apixaban 5 MG Tabs tablet  Commonly known as: ELIQUREED  Take 1 tablet by mouth 2 times daily     metoprolol succinate 100 MG extended release tablet  Commonly known as: TOPROL XL  Take 1 tablet by mouth daily  Start taking on: December 4, 2021        CHANGE how you take these medications    amLODIPine 5 MG tablet  Commonly known as: NORVASC  TAKE 1 TABLET DAILY  What changed: See the new instructions. oxybutynin 5 MG tablet  Commonly known as: DITROPAN  1 tab twice daily  What changed:   · how much to take  · how to take this  · when to take this     Toniyah Max SoloStar 300 UNIT/ML Sopn  Generic drug: Insulin Glargine (2 Unit Dial)  Inject 18 Units into the skin daily  What changed: how much to take        CONTINUE taking these medications    Accu-Chek Indigo Plus strip  Generic drug: blood glucose test strips  1 each by Does not apply route daily     aspirin 81 MG EC tablet     celecoxib 200 MG capsule  Commonly known as: CELEBREX  Take 1 capsule by mouth daily     gabapentin 600 MG tablet  Commonly known as: NEURONTIN  Take 1 tablet by mouth 2 times daily for 90 days. glipiZIDE-metFORMIN 2.5-500 MG per tablet  Commonly known as: METAGLIP  Take 1 tablet by mouth 3 times daily     Handicap Placard Misc  by Does not apply route Duration; 5 years     [employer] Pen Needles 32G X 4 MM Misc  Generic drug: Insulin Pen Needle  1 each by Does not apply route daily     rosuvastatin 20 MG tablet  Commonly known as: CRESTOR  Take 1 tablet by mouth daily     VISION FORMULA PO           Where to Get Your Medications      These medications were sent to [address] [address]    Phone: [phone]   · apixaban 5 MG Tabs tablet  · metoprolol succinate 100 MG extended release tablet         Patient Instructions:    Activity: activity as tolerated  Diet: cardiac diet  Wound Care: none needed  Other: None    Disposition:   Discharge to Home    Follow up:  Patient will be followed by Efra Ivey MD in 1-2 weeks; Dr. Andrew Craft in 3 weeks    CORE MEASURES on Discharge (if applicable)  ACE/ARB in CHF: Yes  Statin in MI: NA  ASA in MI: NA  Statin in CVA: NA  Antiplatelet in CVA: NA    Total time spent on discharge services: 45 minutes    Including the following activities:  Evaluation and Management of patient  Discussion with patient and/or surrogate about current care plan  Coordination with Case Management and/or   Coordination of care with Consultants (if applicable)   Coordination of care with Receiving Facility Physician (if applicable)  Completion of DME forms (if applicable)  Preparation of Discharge Summary  Preparation of Medication Reconciliation  Preparation of Discharge Prescriptions    Signed:  SAGAR Alva - CNP, SAGAR, NP-C  12/3/2021, 2:28 PM

## 2021-12-04 NOTE — PROGRESS NOTES
Patient: Giancarlo Simental  : 1933  Date of Admission: 2021  Primary Care Physician: Maria Guadalupe Pope  Today's Date: 12/3/2021    REASON FOR CONSULTATION:  atrial fibrillation with RVR     HPI:  Mr. Timi Rose is a 80 y.o. male who was admitted to the hospital for new onset atrial fibrillation. He has never had this in the past.     History of 3 vessel bypass in 2010. No heart attacks. No history of weakened heart in the past.     He reports following up in the past with Dr. Maggie Jones in [facility] but has not seen him in over 3 years. He reports it has been awhile since he's seen them. He reports he was breathing a little harder then normal the last couple days since Tuesday. He was sent by Maria Guadalupe Pope MD to ER. He was feeling a little more tired as well. He reports his blood pressure has been good, it was just the heart rate. His feet have been swelling a little more lately. He reports feeling much better and ready to go home. He denied any current or recent chest pain, abdominal pain, bleeding problems, bowel issues, problems with his medications or any other concerns at this time. Past Medical History:   Diagnosis Date    ACE inhibitor-aggravated angioedema     Atrial fibrillation ([address] 2021    CAD (coronary artery disease)     Enlarged prostate     normal cystoscopy 2008 Dr. Cj Booth hypertension     History of Holter monitoring     symptomatic PACs    Hx of echocardiogram     normal    Hyperlipidemia     Osteoarthritis     Type 2 diabetes mellitus with diabetic polyneuropathy, without long-term current use of insulin ([facility])     Type II or unspecified type diabetes mellitus without mention of complication, not stated as uncontrolled        CURRENT ALLERGIES: Altace [ramipril], Diclofenac sodium, Erythromycin, Naprosyn [naproxen], and Vesicare [solifenacin] REVIEW OF SYSTEMS: 14 systems were reviewed.  Pertinent positives and negatives as above, all else negative. Past Surgical History:   Procedure Laterality Date    BELPHAROPTOSIS REPAIR Bilateral 1/9/2015    CARDIOVASCULAR STRESS TEST  4/2010    Abnormal    COLONOSCOPY  11/2009    Normal    CORONARY ARTERY BYPASS GRAFT  2010    CYSTOURETHROSCOPY  7/10/08    CYSTOURETHROSCOPY  10/92    EYE SURGERY  09/2013    bilateral cataracts    EYE SURGERY Bilateral 01/09/2015    PROSTATE SURGERY  10/92    biopsy    Social History:  Social History     Tobacco Use    Smoking status: Never Smoker    Smokeless tobacco: Never Used   Vaping Use    Vaping Use: Never used   Substance Use Topics    Alcohol use: Never    Drug use: No        CURRENT MEDICATIONS:  No outpatient medications have been marked as taking for the 12/2/21 encounter [facility] Encounter) with [facility] ECHO ROOM. FAMILY HISTORY: family history includes Cancer in his father; Heart Disease in his father. PHYSICAL EXAM:   HR: 108  BP: 118/50 There is no height or weight on file to calculate BMI. Constitutional: He is oriented to person, place, and time. He appears well-developed and well-nourished. In no acute distress. HEENT: Normocephalic and atraumatic. No JVD present. Carotid bruit is not present. No mass and no thyromegaly present. No lymphadenopathy present. Cardiovascular: Tachycardic rate, irregularly irregular rhythm, normal heart sounds. Exam reveals no gallop and no friction rubs. 2/6 systolic murmur, 5th intercostal space on the LEFT in the mid-clavicular line (cardiac apex). Pulmonary/Chest: Effort normal and breath sounds normal. No respiratory distress. He has no wheezes, rhonchi or rales. Abdominal: Soft, non-tender. Bowel sounds and aorta are normal. He exhibits no organomegaly, mass or bruit. Extremities: Trace at the ankles. No cyanosis or clubbing. 2+ radial and carotid pulses. Distal extremity pulses: 2+ bilaterally. Neurological: He is alert and oriented to person, place, and time.  No evidence of gross cranial nerve deficit. Coordination appeared normal.   Skin: Skin is warm and dry. There is no rash or diaphoresis. Psychiatric: He has a normal mood and affect. His speech is normal and behavior is normal.      MOST RECENT LABS ON RECORD:   Lab Results   Component Value Date    WBC 6.8 12/03/2021    HGB 12.8 (L) 12/03/2021    HCT 39.6 (L) 12/03/2021     12/03/2021    CHOL 114 01/28/2019    TRIG 96 01/28/2019    HDL 37 (L) 07/09/2021    ALT 41 12/03/2021    AST 19 12/03/2021     12/03/2021    K 4.3 12/03/2021     12/03/2021    CREATININE 0.81 12/03/2021    BUN 20 12/03/2021    CO2 27 12/03/2021    TSH 2.62 12/02/2021    PSA 0.91 12/24/2012    GLUF 96 07/09/2021    LABA1C 6.8 (H) 07/09/2021    LABMICR 20 (H) 07/09/2021         ASSESSMENT:  Patient Active Problem List    Diagnosis Date Noted    Acute on chronic combined systolic and diastolic CHF, NYHA class 4 ([facility])     Moderate malnutrition ([address] 12/03/2021    New onset a-fib ([address] 12/02/2021    Atrial fibrillation, new onset [facility] 12/02/2021    Type 2 diabetes mellitus with diabetic polyneuropathy, without long-term current use of insulin ([facility] 02/05/2019    ASHD (arteriosclerotic heart disease) 09/29/2015    Essential hypertension 09/29/2015    Mixed hyperlipidemia     BPH (benign prostatic hyperplasia) 01/09/2014    OAB (overactive bladder) 01/09/2014    Enlarged prostate 12/31/2012     PLAN:    · New Onset Atrial Fibrillation: Rate Control Symptomatic   Beta Blocker: INCREASE to Metoprolol succinate (Toprol XL) 100 mg daily. Metoprolol tartrate 25 mg q6 PRN HR>110   Calcium Channel Blocker: Stop cardizem drip   Anti-Arrhythmic: Not indicated.   [id number] Score for Atrial Fibrillation Stroke Risk   Risk   Factors  Component Value   C CHF Yes 1   H HTN Yes 1   A2 Age >= 76 Yes,  (80 y.o.) 2   D DM Yes 1   S2 Prior Stroke/TIA No 0   V Vascular Disease No 0   A Age 74-69 No,  (80 y.o.) 0   Sc Sex male 0 Intermediate.          December 3, 2021

## 2021-12-06 ENCOUNTER — CARE COORDINATION (OUTPATIENT)
Dept: CASE MANAGEMENT | Age: 86
End: 2021-12-06

## 2021-12-06 NOTE — CARE COORDINATION
[facility] [facility] Transitions Initial Follow Up Call    Call within 2 business days of discharge: Yes    Patient: Cortney Grande Patient : 1933   MRN: <[id number]>  Reason for Admission: AFIB new onset  Discharge Date: 12/3/21 RARS: Readmission Risk Score: 9.8 ( )      Last Discharge [facility]       Complaint Diagnosis Description Type Department Provider    21 Atrial Fibrillation Atrial fibrillation, new onset ([facility] . .. ED to Hosp-Admission (Discharged) (Jennifer Lazcano) Adi Wells MD; [address] .. First attempt at initial 24 hour CTN call     Spoke with:  Called to speak with patient for initial  transition of care.  Unable to leave message phone was busy [facility]  Facility: [facility]    Non-face-to-face services provided:  Obtained and reviewed discharge summary and/or continuity of care documents    Care Transitions 24 Hour Call    Care Transitions Interventions         Follow Up  Future Appointments   Date Time Provider Zamzam Parks   12/10/2021  1:00 PM Jez Colon 1615, APRN - CNP [place] SMITH   2021 10:45 AM [facility]   2021  2:00 PM [address] Stress [facility]   2021  8:40 AM Mae Nuñez MD TIFF CARD [facility]   2022  8:30 AM MD [place] LPN

## 2021-12-07 ENCOUNTER — CARE COORDINATION (OUTPATIENT)
Dept: CASE MANAGEMENT | Age: 86
End: 2021-12-07

## 2021-12-07 DIAGNOSIS — I48.91 NEW ONSET ATRIAL FIBRILLATION (HCC): Primary | ICD-10-CM

## 2021-12-07 PROCEDURE — 1111F DSCHRG MED/CURRENT MED MERGE: CPT | Performed by: INTERNAL MEDICINE

## 2021-12-07 NOTE — CARE COORDINATION
Rehana 45 Transitions Initial Follow Up Call    Call within 2 business days of discharge: Yes    Patient: Ria Franklin Patient : 1933   MRN: <[id number]>  Reason for Admission: AFIB new onset  Discharge Date: 12/3/21 RARS: Readmission Risk Score: 9.8 ( )      Last Discharge [facility]       Complaint Diagnosis Description Type Department Provider    21 Atrial Fibrillation Atrial fibrillation, new onset ([facility] [address] . .. ED to Hosp-Admission (Discharged) (Sasha Dickerson) Toan Villalta MD; Clifford Webb .. Spoke with: Gerry Bowden he states he is feeling tired. Denies chest pain, SOB, palpitations, dizziness. Is eating nd drinking well, normal bowel and bladder elimination has some lower extremity edema. states it has been there for years. Medications reviewed and taking all as directed. 1111 f completed. Has f/u appointments no concerns. Facility: [facility]    Non-face-to-face services provided:  Obtained and reviewed discharge summary and/or continuity of care documents  Transitions of Care Initial Call    Was this an external facility discharge? No     Challenges to be reviewed by the provider   Additional needs identified to be addressed with provider: No  none             Method of communication with provider : none      Advance Care Planning:   Does patient have an Advance Directive: reviewed and current, reviewed and needs to be updated, not on file; education provided, decision maker updated and referral to internal ACP facilitator. Was this a readmission? No  Patient stated reason for admission: palpitaitons   Patients top risk factors for readmission: medical condition-AFIB    Care Transition Nurse (CTN) contacted the patient by telephone to perform post hospital discharge assessment. Verified name and  with patient as identifiers. Provided introduction to self, and explanation of the CTN role.      CTN reviewed discharge instructions, medical action plan and red flags with patient who verbalized understanding. Patient given an opportunity to ask questions and does not have any further questions or concerns at this time. Were discharge instructions available to patient? Yes. Reviewed appropriate site of care based on symptoms and resources available to patient including: PCP, Specialist and When to call 911. The patient agrees to contact the PCP office for questions related to their healthcare. Medication reconciliation was performed with patient, who verbalizes understanding of administration of home medications. Advised obtaining a 90-day supply of all daily and as-needed medications. Covid Risk Education     Educated patient about risk for severe COVID-19 due to risk factors according to CDC guidelines. LPN CC reviewed discharge instructions, medical action plan and red flag symptoms with the patient who verbalized understanding. Discussed COVID vaccination status: Yes. Education provided on COVID-19 vaccination as appropriate. Discussed exposure protocols and quarantine with CDC Guidelines. Patient was given an opportunity to verbalize any questions and concerns and agrees to contact LPN CC or health care provider for questions related to their healthcare. Reviewed and educated patient on any new and changed medications related to discharge diagnosis. Was patient discharged with a pulse oximeter? No Discussed and confirmed pulse oximeter discharge instructions and when to notify provider or seek emergency care. LPN CC provided contact information. Plan for follow-up call in 5-7 days based on severity of symptoms and risk factors.   Plan for next call: symptom management-chest pain stress test      Care Transitions 24 Hour Call    Do you have any ongoing symptoms?: Yes  Patient-reported symptoms: Fatigue  Interventions for patient-reported symptoms: Other  Do you have a copy of your discharge instructions?: Yes  Do you have all of your prescriptions and are they filled?: Yes  Have you been contacted by a [address]?: No  Have you scheduled your follow up appointment?: Yes  How are you going to get to your appointment?: Car - family or friend to transport  Were you discharged with any Home Care or Post Acute Services: No  Do you feel like you have everything you need to keep you well at home?: Yes  Care Transitions Interventions         Follow Up  Future Appointments   Date Time Provider Zamzam Parks   12/10/2021  1:00 PM Jez Colon 1615, APRN - CNP Chevy SMITH   12/13/2021 10:45 AM [facility] CARDIOLOGY STRESS ROOM [facility] Stress Ryan   12/14/2021  2:00 PM [address] Stress Ryan   12/22/2021  8:40 AM Melissa Lucia MD TIFF CARD [facility]   1/19/2022  8:30 AM MD Chevy Farley, THEON

## 2021-12-10 DIAGNOSIS — I50.43 ACUTE ON CHRONIC COMBINED SYSTOLIC AND DIASTOLIC CHF, NYHA CLASS 4 (HCC): Primary | ICD-10-CM

## 2021-12-13 ENCOUNTER — HOSPITAL ENCOUNTER (OUTPATIENT)
Dept: NON INVASIVE DIAGNOSTICS | Age: 86
Discharge: HOME | End: 2021-12-13
Payer: MEDICARE

## 2021-12-13 DIAGNOSIS — I50.43 ACUTE ON CHRONIC COMBINED SYSTOLIC AND DIASTOLIC CHF, NYHA CLASS 4 (HCC): ICD-10-CM

## 2021-12-13 PROCEDURE — 3430000000 HC RX DIAGNOSTIC RADIOPHARMACEUTICAL: Performed by: FAMILY MEDICINE

## 2021-12-13 PROCEDURE — 93017 CV STRESS TEST TRACING ONLY: CPT

## 2021-12-13 PROCEDURE — 6360000002 HC RX W HCPCS: Performed by: FAMILY MEDICINE

## 2021-12-13 PROCEDURE — A9500 TC99M SESTAMIBI: HCPCS | Performed by: FAMILY MEDICINE

## 2021-12-13 RX ADMIN — REGADENOSON 0.4 MG: 0.08 INJECTION, SOLUTION INTRAVENOUS at 11:06

## 2021-12-13 RX ADMIN — Medication 30 MILLICURIE: at 11:06

## 2021-12-14 ENCOUNTER — CARE COORDINATION (OUTPATIENT)
Dept: CASE MANAGEMENT | Age: 86
End: 2021-12-14

## 2021-12-14 ENCOUNTER — HOSPITAL ENCOUNTER (OUTPATIENT)
Dept: NON INVASIVE DIAGNOSTICS | Age: 86
Discharge: HOME | End: 2021-12-14
Payer: MEDICARE

## 2021-12-14 PROCEDURE — 78452 HT MUSCLE IMAGE SPECT MULT: CPT

## 2021-12-14 PROCEDURE — 3430000000 HC RX DIAGNOSTIC RADIOPHARMACEUTICAL: Performed by: FAMILY MEDICINE

## 2021-12-14 PROCEDURE — A9500 TC99M SESTAMIBI: HCPCS | Performed by: FAMILY MEDICINE

## 2021-12-14 RX ADMIN — Medication 30 MILLICURIE: at 14:03

## 2021-12-14 NOTE — CARE COORDINATION
Rehana 45 Transitions Follow Up Call    2021    Patient: Heather Guevara  Patient : 1933   MRN: <[id number]>  Reason for Admission:  AFIB new onset  Discharge Date: 12/3/21 RARS: Readmission Risk Score: 9.8 ( )         Spoke with: Called to speak with patient for update with transition of care. Left HIPPA compliant voice message with contact information [phone] for a call  Back with an update. Care Transitions Subsequent and Final Call    Subsequent and Final Calls  Care Transitions Interventions  Other Interventions:            Follow Up  Future Appointments   Date Time Provider Zamzam Parks   2021  8:40 AM Juaquin Loving MD TIFF CARD [facility]   2022  8:30 AM Richy Scott MD [facility] Lacho Garcia LPN

## 2021-12-15 ENCOUNTER — TELEPHONE (OUTPATIENT)
Dept: CARDIOLOGY | Age: 86
End: 2021-12-15

## 2021-12-15 NOTE — PROCEDURES
[address] [place], [place] [address]                              CARDIAC STRESS TEST    PATIENT NAME: Boby Braxton                 :        1933  MED REC NO:   [id number]                              ROOM:  ACCOUNT NO:   [de-identified]                           ADMIT DATE: 2021  PROVIDER:     Rosemarie Bauer MD    CARDIOVASCULAR DIAGNOSTIC DEPARTMENT    DATE OF STUDY:  2021    ORDERING PROVIDER:  Rosemarie Bauer MD    PRIMARY CARE PROVIDER:  Pedro Andrews MD    INTERPRETING PHYSICIAN:  Rosemarie Bauer MD    PHARMACOLOGIC MYOCARDIAL PERFUSION STRESS TESTING    Stress/rest single isotope SPECT imaging with exercise stress and gated  SPECT imaging. INDICATIONS:  Diagnosis of coronary disease  Assessment of recent chest pain and/or discomfort  Assessment of a cardiac cause of:  CHF    CLINICAL HISTORY:  The patient is a 58-year-old man with known coronary  artery disease. Previous cardiac history includes:  CAD, stress test, cardiac  catheterization, CABG    Other previous history includes:  Chest pain, fatigue, dyspnea, diabetes  mellitus, arthritis, hypertension    Symptoms just prior to testing include:  None    Relevant medications:  Metoprolol, Norvasc    PROCEDURE:  The heart rate was 105 at baseline and julio c to 139 beats per  minute during the regadenoson infusion. The rest blood pressure was  112/60 mm/Hg and increased to 130/70 mm/Hg. The patient did not  complain of any significant symptoms following infusion. Pharmacologic stress testing was performed with regadenoson at a dose of  0.4 mg. Additionally, low level exercise using hand compressions were  performed along with vasodilator infusion. MYOCARDIAL PERFUSION IMAGING:  Imaging was performed at rest 30-45  minutes following the injection of 30 mCi of sestamibi.   Approximately  10 seconds after Lexiscan injection, the patient was injected with 30  mCi of sestamibi. Gating post-stress tomographic imaging was performed  30-45 minutes after stress. STRESS ECG RESULTS:  The resting electrocardiogram demonstrated atrial  fibrillation without significant ST-segment abnormalities that may  impair accurate ECG detection of stress induced cardiac ischemia. During vasodilator infusion and during recovery, the patient developed:    No significant ST segment changes suggestive of myocardial ischemia with  no premature ventricular contractions (PVCs). NUCLEAR IMAGING RESULTS:  The overall quality of the study is good. Mild attenuation artifact was seen. There is no evidence of abnormal  lung uptake. Additionally, the right ventricle appears normal.  The  left ventricular cavity is noted to be normal in size on the stress  images. There is no evidence of transient ischemic dilatation (TID) of  the left ventricle. Gated SPECT imaging demonstrates global hypokinesis. The calculated  left ventricular ejection fraction was 37%. The rest images demonstrate homogeneous tracer distribution throughout  the myocardium. On stress imaging, a small perfusion abnormality of mild intensity in  the inferolateral and inferior region(s) which is most likely due to  artifact. IMPRESSION:  1. Equivocal myocardial perfusion study. There is a small/moderate  perfusion defect of mild intensity in the inferolateral and inferior  region(s) during stress imaging which is most consistent with artifact  but may be due to a small degree of coronary ischemia. 2.  Global left ventricular systolic function was abnormal with an EF of  37% without regional wall motion abnormalities. 3.  No significant electrocardiographic evidence of myocardial ischemia  during EKG monitoring without significant associated arrhythmias. Overall, these results are most consistent with an intermediate risk for  significant coronary artery disease.     Depending on the patient symptoms and level

## 2021-12-15 NOTE — RESULT ENCOUNTER NOTE
Please let Mr. Timi Rose know that his test was abnormal and please make them an appointment in 1-2 weeks if not already done. Thanks.     Pam

## 2021-12-15 NOTE — TELEPHONE ENCOUNTER
----- Message from Mae Nuñez MD sent at 12/15/2021 10:42 AM EST -----  Please let Mr. Eamon Hanson know that his test was abnormal and please make them an appointment in 1-2 weeks if not already done. Thanks.     Pam

## 2021-12-21 ENCOUNTER — CARE COORDINATION (OUTPATIENT)
Dept: CASE MANAGEMENT | Age: 86
End: 2021-12-21

## 2021-12-21 NOTE — CARE COORDINATION
Rehana 45 Transitions Follow Up Call    2021    Patient: Julio C Mccloud  Patient : 1933   MRN: <[id number]>  Reason for Admission: AFIB new onset  Discharge Date: 12/3/21 RARS: Readmission Risk Score: 9.8 ( )         Spoke with:Called to speak with patient for update with transition of care. Left HIPPA compliant voice message with contact information [phone] for a call  Back with an update. Care Transitions Subsequent and Final Call    Subsequent and Final Calls  Care Transitions Interventions  Other Interventions:            Follow Up  Future Appointments   Date Time Provider Zamzam Parks   2021  9:00 AM Jeff Lomas MD TIFF CARD [facility]   2022  8:30 AM Francis Quesada MD [facility] Ryan Deluca LPN

## 2021-12-22 ENCOUNTER — HOSPITAL ENCOUNTER (OUTPATIENT)
Age: 86
Discharge: HOME | End: 2021-12-22
Payer: MEDICARE

## 2021-12-22 ENCOUNTER — OFFICE VISIT (OUTPATIENT)
Dept: CARDIOLOGY | Age: 86
End: 2021-12-22
Payer: MEDICARE

## 2021-12-22 VITALS
RESPIRATION RATE: 18 BRPM | DIASTOLIC BLOOD PRESSURE: 68 MMHG | WEIGHT: 186 LBS | HEIGHT: 70 IN | SYSTOLIC BLOOD PRESSURE: 105 MMHG | HEART RATE: 118 BPM | OXYGEN SATURATION: 99 % | BODY MASS INDEX: 26.63 KG/M2

## 2021-12-22 DIAGNOSIS — I10 ESSENTIAL HYPERTENSION: ICD-10-CM

## 2021-12-22 DIAGNOSIS — I50.43 ACUTE ON CHRONIC COMBINED SYSTOLIC AND DIASTOLIC CHF, NYHA CLASS 4 (HCC): ICD-10-CM

## 2021-12-22 DIAGNOSIS — I48.0 PAF (PAROXYSMAL ATRIAL FIBRILLATION) (HCC): Primary | ICD-10-CM

## 2021-12-22 DIAGNOSIS — R94.39 ABNORMAL STRESS TEST: ICD-10-CM

## 2021-12-22 DIAGNOSIS — Z79.01 ENCOUNTER FOR CURRENT LONG-TERM USE OF ANTICOAGULANTS: ICD-10-CM

## 2021-12-22 DIAGNOSIS — I48.91 ATRIAL FIBRILLATION, NEW ONSET (HCC): ICD-10-CM

## 2021-12-22 LAB
ANION GAP SERPL CALCULATED.3IONS-SCNC: 12 MMOL/L (ref 9–17)
BUN BLDV-MCNC: 30 MG/DL (ref 8–23)
BUN/CREAT BLD: 32 (ref 9–20)
CALCIUM SERPL-MCNC: 9.8 MG/DL (ref 8.6–10.4)
CHLORIDE BLD-SCNC: 100 MMOL/L (ref 98–107)
CO2: 27 MMOL/L (ref 20–31)
CREAT SERPL-MCNC: 0.95 MG/DL (ref 0.7–1.2)
GFR AFRICAN AMERICAN: >60 ML/MIN
GFR NON-AFRICAN AMERICAN: >60 ML/MIN
GFR SERPL CREATININE-BSD FRML MDRD: ABNORMAL ML/MIN/{1.73_M2}
GFR SERPL CREATININE-BSD FRML MDRD: ABNORMAL ML/MIN/{1.73_M2}
GLUCOSE BLD-MCNC: 213 MG/DL (ref 70–99)
POTASSIUM SERPL-SCNC: 5.4 MMOL/L (ref 3.7–5.3)
SODIUM BLD-SCNC: 139 MMOL/L (ref 135–144)

## 2021-12-22 PROCEDURE — 80048 BASIC METABOLIC PNL TOTAL CA: CPT

## 2021-12-22 PROCEDURE — 99215 OFFICE O/P EST HI 40 MIN: CPT | Performed by: FAMILY MEDICINE

## 2021-12-22 PROCEDURE — 1123F ACP DISCUSS/DSCN MKR DOCD: CPT | Performed by: FAMILY MEDICINE

## 2021-12-22 PROCEDURE — G8417 CALC BMI ABV UP PARAM F/U: HCPCS | Performed by: FAMILY MEDICINE

## 2021-12-22 PROCEDURE — 1111F DSCHRG MED/CURRENT MED MERGE: CPT | Performed by: FAMILY MEDICINE

## 2021-12-22 PROCEDURE — G8484 FLU IMMUNIZE NO ADMIN: HCPCS | Performed by: FAMILY MEDICINE

## 2021-12-22 PROCEDURE — 4040F PNEUMOC VAC/ADMIN/RCVD: CPT | Performed by: FAMILY MEDICINE

## 2021-12-22 PROCEDURE — 36415 COLL VENOUS BLD VENIPUNCTURE: CPT

## 2021-12-22 PROCEDURE — 1036F TOBACCO NON-USER: CPT | Performed by: FAMILY MEDICINE

## 2021-12-22 PROCEDURE — G8427 DOCREV CUR MEDS BY ELIG CLIN: HCPCS | Performed by: FAMILY MEDICINE

## 2021-12-22 PROCEDURE — 99211 OFF/OP EST MAY X REQ PHY/QHP: CPT | Performed by: FAMILY MEDICINE

## 2021-12-22 RX ORDER — METOPROLOL SUCCINATE 100 MG/1
100 TABLET, EXTENDED RELEASE ORAL SEE ADMIN INSTRUCTIONS
COMMUNITY
End: 2021-12-22 | Stop reason: SDUPTHER

## 2021-12-22 RX ORDER — METOPROLOL SUCCINATE 100 MG/1
100 TABLET, EXTENDED RELEASE ORAL SEE ADMIN INSTRUCTIONS
Qty: 135 TABLET | Refills: 3 | Status: ON HOLD | OUTPATIENT
Start: 2021-12-22 | End: 2021-12-23 | Stop reason: SDUPTHER

## 2021-12-22 NOTE — PATIENT INSTRUCTIONS
SURVEY:    You may be receiving a survey from [employer] regarding your visit today. Please complete the survey to enable us to provide the highest quality of care to you and your family. If you cannot score us a very good on any question, please call the office to discuss how we could have made your experience a very good one. Thank you.

## 2021-12-22 NOTE — PROGRESS NOTES
Otoniel Silverman am scribing for and in the presence of Veronica Soto. Pam HART, MS, F.A.C.C. Patient: Renetta Boucher  : 1933  Date of Admission: (Not on file)  Primary Care Physician: Efra Ivey  Today's Date: 2021    REASON FOR CONSULTATION: Follow-Up from Hospital (HX:New onset AFib, abnormal stress Pt is here for hosp follow up he was in ER  for AFib and abnormal stress  he states he is feeling very tired and legs are heavy, he does have some SOB, one episode of lightheaded/dizziness near fall, palp  Denies:CP, )    Dear Dr Fede Sue,     HPI:  Mr. Charmaine Whalen is a 80 y.o. male who was admitted to the hospital for new onset atrial fibrillation. He has never had this in the past. History of 3 vessel bypass in 2010. No heart attacks. No history of weakened heart in the past. He reports following in the past with Dr. Kimberly Floyd in [facility] but has not seen him in over 3 years. He reports it has been awhile since he's seen them. He had echo done on 2021 which shows EF 50-55%  Mild aortic stenosis with peak and mean gradient of 17 and 10 mmHg although it looks worse on 2D imaging. Mitral annular calcification is seen with a mean gradient of 4.2 mmHg consistent with mild calcific mitral stenosis. Mild to moderate mitral regurgitation was seen. Moderate to severe tricuspid regurgitation. Moderate pulmonary hypertension with an estimated right ventricular systolic pressure of 57 mmHg. Diastolic function cannot be properly assessed due to atrial fibrillation. He also had abnormal stress on 2021 which was Equivocal study and EF 37%.      Mr. Charmaine Whalen is here for hospital follow up and to go over testing results. He reports he is very tired and has leg weakness over the last couple of weeks. He does feel a quiver on left side that happens a couple of times a week and last seconds. He also has shortness of breath.  He says he feels full he does have lightheaded/dizziness and was able to back up to bed and sit back down. He also has lightheaded/dizziness and unsteady gait when sugar is low. He does notice swelling in legs although he had injury in the past.  Denies cough fever or chills. He denies stomach pain, nausea or vomiting. He denied any current or recent chest pain, abdominal pain, bleeding problems, bowel issues, problems with his medications or any other concerns at this time. Past Medical History:   Diagnosis Date    ACE inhibitor-aggravated angioedema 2012    Atrial fibrillation ([address] 12/02/2021    CAD (coronary artery disease)     Enlarged prostate     normal cystoscopy 7/2008 Dr. Melchor Ponce hypertension     History of Holter monitoring 2015    symptomatic PACs    Hx of echocardiogram 2015    normal    Hyperlipidemia     Osteoarthritis     Type 2 diabetes mellitus with diabetic polyneuropathy, without long-term current use of insulin (HCC)     Type II or unspecified type diabetes mellitus without mention of complication, not stated as uncontrolled        CURRENT ALLERGIES: Altace [ramipril], Diclofenac sodium, Erythromycin, Naprosyn [naproxen], and Vesicare [solifenacin] REVIEW OF SYSTEMS: 14 systems were reviewed. Pertinent positives and negatives as above, all else negative.      Past Surgical History:   Procedure Laterality Date    BELPHAROPTOSIS REPAIR Bilateral 1/9/2015    CARDIOVASCULAR STRESS TEST  4/2010    Abnormal    COLONOSCOPY  11/2009    Normal    CORONARY ARTERY BYPASS GRAFT  2010    CYSTOURETHROSCOPY  7/10/08    CYSTOURETHROSCOPY  10/92    EYE SURGERY  09/2013    bilateral cataracts    EYE SURGERY Bilateral 01/09/2015    PROSTATE SURGERY  10/92    biopsy    Social History:  Social History     Tobacco Use    Smoking status: Never Smoker    Smokeless tobacco: Never Used   Vaping Use    Vaping Use: Never used   Substance Use Topics    Alcohol use: Never    Drug use: No        CURRENT MEDICATIONS:  Outpatient Medications Marked as Taking for the 12/22/21 encounter (Office Visit) with Karin Johns MD   Medication Sig Dispense Refill    gabapentin (NEURONTIN) 600 MG tablet Take 1 tablet by mouth 2 times daily for 90 days. 180 tablet 0    amLODIPine (NORVASC) 5 MG tablet Take 1 tablet by mouth daily 90 tablet 3    furosemide (LASIX) 20 MG tablet Take 1 tablet by mouth daily 60 tablet 3    apixaban (ELIQUIS) 5 MG TABS tablet Take 1 tablet by mouth 2 times daily 60 tablet 3    metoprolol succinate (TOPROL XL) 100 MG extended release tablet Take 1 tablet by mouth daily 30 tablet 3    glipiZIDE-metFORMIN (METAGLIP) 2.5-500 MG per tablet Take 1 tablet by mouth 3 times daily 270 tablet 3    Insulin Pen Needle ([employer] PEN NEEDLES) 32G X 4 MM MISC 1 each by Does not apply route daily 100 each 2    oxybutynin (DITROPAN) 5 MG tablet 1 tab twice daily (Patient taking differently: Take 5 mg by mouth 2 times daily 1 tab twice daily ) 180 tablet 3    Insulin Glargine, 2 Unit Dial, (TOUJEO MAX SOLOSTAR) 300 UNIT/ML SOPN Inject 18 Units into the skin daily (Patient taking differently: Inject 20 Units into the skin daily ) 10 pen 3    blood glucose test strips (ACCU-CHEK LALO PLUS) strip 1 each by Does not apply route daily 50 strip 3    rosuvastatin (CRESTOR) 20 MG tablet Take 1 tablet by mouth daily 90 tablet 3    celecoxib (CELEBREX) 200 MG capsule Take 1 capsule by mouth daily 90 capsule 3    Handicap Placard MISC by Does not apply route Duration; 5 years 1 each 0    Multiple Vitamins-Minerals (VISION FORMULA PO) Take 1 tablet by mouth daily      aspirin 81 MG EC tablet Take 81 mg by mouth daily. FAMILY HISTORY: family history includes Cancer in his father; Heart Disease in his father. PHYSICAL EXAM:   /68 (Site: Left Upper Arm, Position: Sitting, Cuff Size: Large Adult)   Pulse 118   Resp 18   Ht 5' 10\" (1.778 m)   Wt 186 lb (84.4 kg)   SpO2 99%   BMI 26.69 kg/m²  Body mass index is 26.69 kg/m².     Constitutional: onset ([facility] [address] 12/02/2021    Type 2 diabetes mellitus with diabetic polyneuropathy, without long-term current use of insulin ([facility] 02/05/2019    ASHD (arteriosclerotic heart disease) 09/29/2015    Essential hypertension 09/29/2015    Mixed hyperlipidemia     BPH (benign prostatic hyperplasia) 01/09/2014    OAB (overactive bladder) 01/09/2014    Enlarged prostate 12/31/2012       PLAN:   Abnormal Stress Test: Equivical: However biggest problem is increased tiredness and fatigue, no chest pain so I suspect this is more cause by new onset atrial fibrillation    For these reasons, I recommended that the patient consider undergoing a cardiac catheterization with coronary angiography to assess their coronary anatomy and facilitate better treatment recommendations. I discussed the risks, benefits, and alternatives to the procedure including the risk of bleeding, contrast induced allergy and/or kidney damage, arrythmia, stroke, heart attack, death, or the need for further procedures. I also discussed the fact that although treatment with simple medical management is a potential treatment option in place of cardiac catheterization, I expressed my opinion that cardiac catheterization in order to define their coronary anatomy and rule out severe 3 vessel or left main coronary artery disease would significant help guide the most appropriate treatment strategy ranging from no treatment, to medications, stents, to even coronary bypass surgery. We discussed doing a heart catheterization but decided on 1st treating his atrial fibrillation and the re-evaluating in 3-4 weeks about possibly doing a heart catheterization at that time if symptoms persist or worsen.  Medical Management for suspected Coronary artery disease and myocardial ischemia:   Antiplatelet Agent: Continue Aspirin 81 mg daily.  Beta Blocker: Continue Metoprolol succinate (Toprol XL) 100 mg daily.     Anti-anginal medications: Not indicated at this time.   Cholesterol Reduction Therapy: Continue rosuvastatin (Crestor) 20 mg daily.  Additional Testing List: None    · Paroxysmal Atrial Fibrillation: Rate Control Symptomatic.  Beta Blocker: Continue Metoprolol succinate (Toprol XL) 100 mg daily. [id number] Score for Atrial Fibrillation Stroke Risk    Risk   Factors  Component Value   C CHF Yes 1   H HTN Yes 1   A2 Age >= 76 Yes,  (80 y.o.) 2   D DM Yes 1   S2 Prior Stroke/TIA No 0   V Vascular Disease No 0   A Age 74-69 No,  (80 y.o.) 0   Sc Sex male 0    [id number]  Score  5   Score last updated 24/4/99 5:13 PM EST  Click here for a link to the UpToDate guideline \"Atrial Fibrillation: Anticoagulation therapy to prevent embolization  Disclaimer: Risk Score calculation is dependent on accuracy of patient problem list and past encounter diagnosis.  Stroke Risk: CHADS2-VASc Score: 5/9 (6.7% stroke risk)   Anticoagulation: Continue Apixaban (Eliquis) 5 mg every 12 hours.  Additional Testing List: Additional Testing List: Cardioversion: Because of this, I spent about 10 minutes discussing the multiple treatment options including rate control and rhythm control as well as medication and ablation therapies. In the end I recommended that they consider proceeding with a cardioversion to see if this could help improve their symptoms. I also discussed the risks, benefits, and alternatives of the procedure including the risk of arrythmia, stroke, heart attack, death, or the need for further procedures. I also discussed the alternate treatment strategy of simple medical management without cardioversion and just trying to maximize things with medications. The patient verbalized understanding of the risks benefits and stated that they would like to proceed with the cardioversion.  We will plan to do that tomorrow, 12/23/21    Acute on chronic combined heart failure: New [place] Heart Association Class: IV (Severe limitations, symptoms even at rest)  · Beta Blocker: Continue Metoprolol succinate (Toprol XL) 100 mg daily. · Diuretics: Continue Lasix 20 mg daily. · Heart failure counseling: I advised them to try and keep their legs up whenever possible and to limit salt in their diet.  Atherosclerotic Heart Disease: History of open heart surgery.  Antiplatelet Agent: Continue Aspirin 81 mg daily.  Beta Blocker: Continue Metoprolol succinate (Toprol XL) 100 mg daily.  Cholesterol Reduction Therapy: Continue rosuvastatin (Crestor) 20 mg daily. Finally, I recommended that he continue his other medications and follow up with you as previously scheduled. FOLLOW UP:   I told Mr. Basia Gonzales to call my office if he had any problems, but otherwise told him to Return in about 4 weeks (around 1/19/2022). However, I would be happy to see him sooner should the need arise. Sincerely,  Danny Gamez. Pam HART, MS, F.A.C.C. [facility] Cardiology Specialist    [address] [place] [address]  Phone: [phone], Fax: [phone]      I believe that the risk of significant morbidity and mortality related to the patient's current medical conditions are: intermediate-high. The documentation recorded by the scribe, accurately and completely reflects the services I personally performed and the decisions made by me. Esteban Son MD, MS, F.A.C.C.  December 22, 2021

## 2021-12-23 ENCOUNTER — HOSPITAL ENCOUNTER (OUTPATIENT)
Dept: CARDIAC CATH/INVASIVE PROCEDURES | Age: 86
Discharge: HOME | End: 2021-12-23
Attending: FAMILY MEDICINE | Admitting: FAMILY MEDICINE
Payer: MEDICARE

## 2021-12-23 VITALS
BODY MASS INDEX: 26.63 KG/M2 | TEMPERATURE: 96.6 F | HEART RATE: 77 BPM | HEIGHT: 70 IN | OXYGEN SATURATION: 95 % | RESPIRATION RATE: 14 BRPM | SYSTOLIC BLOOD PRESSURE: 116 MMHG | DIASTOLIC BLOOD PRESSURE: 64 MMHG | WEIGHT: 186 LBS

## 2021-12-23 DIAGNOSIS — I50.43 ACUTE ON CHRONIC COMBINED SYSTOLIC AND DIASTOLIC CHF, NYHA CLASS 4 (HCC): ICD-10-CM

## 2021-12-23 DIAGNOSIS — R94.39 ABNORMAL STRESS TEST: ICD-10-CM

## 2021-12-23 PROCEDURE — 92960 CARDIOVERSION ELECTRIC EXT: CPT

## 2021-12-23 PROCEDURE — 2580000003 HC RX 258: Performed by: FAMILY MEDICINE

## 2021-12-23 PROCEDURE — 6360000002 HC RX W HCPCS: Performed by: FAMILY MEDICINE

## 2021-12-23 PROCEDURE — 93005 ELECTROCARDIOGRAM TRACING: CPT | Performed by: FAMILY MEDICINE

## 2021-12-23 RX ORDER — SODIUM CHLORIDE 0.9 % (FLUSH) 0.9 %
5-40 SYRINGE (ML) INJECTION EVERY 12 HOURS SCHEDULED
Status: DISCONTINUED | OUTPATIENT
Start: 2021-12-23 | End: 2021-12-23 | Stop reason: HOSPADM

## 2021-12-23 RX ORDER — MIDAZOLAM HYDROCHLORIDE 1 MG/ML
INJECTION INTRAMUSCULAR; INTRAVENOUS
Status: COMPLETED | OUTPATIENT
Start: 2021-12-23 | End: 2021-12-23

## 2021-12-23 RX ORDER — METOPROLOL SUCCINATE 100 MG/1
50 TABLET, EXTENDED RELEASE ORAL DAILY
Qty: 135 TABLET | Refills: 3 | Status: SHIPPED | OUTPATIENT
Start: 2021-12-23 | End: 2022-03-16 | Stop reason: SDUPTHER

## 2021-12-23 RX ORDER — SODIUM CHLORIDE 9 MG/ML
25 INJECTION, SOLUTION INTRAVENOUS PRN
Status: DISCONTINUED | OUTPATIENT
Start: 2021-12-23 | End: 2021-12-23 | Stop reason: HOSPADM

## 2021-12-23 RX ORDER — SODIUM CHLORIDE 9 MG/ML
INJECTION, SOLUTION INTRAVENOUS CONTINUOUS
Status: DISCONTINUED | OUTPATIENT
Start: 2021-12-23 | End: 2021-12-23 | Stop reason: HOSPADM

## 2021-12-23 RX ORDER — SODIUM CHLORIDE 0.9 % (FLUSH) 0.9 %
5-40 SYRINGE (ML) INJECTION PRN
Status: DISCONTINUED | OUTPATIENT
Start: 2021-12-23 | End: 2021-12-23 | Stop reason: HOSPADM

## 2021-12-23 RX ORDER — AMIODARONE HYDROCHLORIDE 200 MG/1
200 TABLET ORAL 2 TIMES DAILY
Qty: 60 TABLET | Refills: 0 | Status: SHIPPED | OUTPATIENT
Start: 2021-12-23 | End: 2022-01-11

## 2021-12-23 RX ORDER — FENTANYL CITRATE 50 UG/ML
INJECTION, SOLUTION INTRAMUSCULAR; INTRAVENOUS
Status: COMPLETED | OUTPATIENT
Start: 2021-12-23 | End: 2021-12-23

## 2021-12-23 RX ADMIN — SODIUM CHLORIDE: 9 INJECTION, SOLUTION INTRAVENOUS at 13:22

## 2021-12-23 RX ADMIN — MIDAZOLAM 1 MG: 1 INJECTION INTRAMUSCULAR; INTRAVENOUS at 14:15

## 2021-12-23 RX ADMIN — MIDAZOLAM 1 MG: 1 INJECTION INTRAMUSCULAR; INTRAVENOUS at 14:13

## 2021-12-23 RX ADMIN — FENTANYL CITRATE 25 MCG: 50 INJECTION INTRAMUSCULAR; INTRAVENOUS at 14:13

## 2021-12-23 NOTE — OP NOTE
Brief Postoperative Note:    Patient name: Sujey Lou   YOB: 1933   Date of Procedure: 12/23/2021   Medical Record Number: [id number]    Procedure: Cardioversion    Pre-operative Diagnosis: Persistent atrial fibrillation, symptomatic    Post-operative Diagnosis: Successful cardioversion to NSR with  200J biphasic    Anesthesia: Conscious sedation    Surgeons/Assistants: Pam    Estimated Blood Loss: None    Complications: None    I asked Mr. Arnlado Jacob to call my office if he had any problems, but otherwise to follow up with me in about a month as well as follow up with his primary care physician as previously scheduled. Bhavna Orozco MD, MS, F.A.C.C.   [facility]  [address] [place] [address]  Phone: [phone], Fax: [phone]       Electronically signed by Armin Dash MD on 12/23/2021 at 3:48 PM

## 2021-12-23 NOTE — PROGRESS NOTES
DR Kavin Stokes in to discuss medication changes. Patient and family voice understanding. Taken by wheelchair to private car driven by son, all belongings sent with patient.

## 2021-12-23 NOTE — PROGRESS NOTES
Resting quietly in bed, awake and family at bedside. Vital signs stable. Assisted to sit at edge of bed, denies complaint. IV discontinued and monitors disconnected. Getting dressed for home.

## 2021-12-24 LAB
EKG ATRIAL RATE: 241 BPM
EKG ATRIAL RATE: 73 BPM
EKG P AXIS: 19 DEGREES
EKG P-R INTERVAL: 184 MS
EKG Q-T INTERVAL: 360 MS
EKG Q-T INTERVAL: 410 MS
EKG QRS DURATION: 80 MS
EKG QRS DURATION: 84 MS
EKG QTC CALCULATION (BAZETT): 445 MS
EKG QTC CALCULATION (BAZETT): 451 MS
EKG R AXIS: 73 DEGREES
EKG R AXIS: 74 DEGREES
EKG T AXIS: -4 DEGREES
EKG T AXIS: 10 DEGREES
EKG VENTRICULAR RATE: 73 BPM
EKG VENTRICULAR RATE: 92 BPM

## 2021-12-24 PROCEDURE — 93010 ELECTROCARDIOGRAM REPORT: CPT | Performed by: FAMILY MEDICINE

## 2022-01-11 ENCOUNTER — HOSPITAL ENCOUNTER (OUTPATIENT)
Age: 87
Discharge: HOME | End: 2022-01-11
Payer: MEDICARE

## 2022-01-11 ENCOUNTER — OFFICE VISIT (OUTPATIENT)
Dept: CARDIOLOGY | Age: 87
End: 2022-01-11
Payer: MEDICARE

## 2022-01-11 VITALS
HEIGHT: 70 IN | RESPIRATION RATE: 18 BRPM | WEIGHT: 178.2 LBS | OXYGEN SATURATION: 97 % | BODY MASS INDEX: 25.51 KG/M2 | DIASTOLIC BLOOD PRESSURE: 74 MMHG | HEART RATE: 59 BPM | SYSTOLIC BLOOD PRESSURE: 130 MMHG

## 2022-01-11 DIAGNOSIS — I50.43 ACUTE ON CHRONIC COMBINED SYSTOLIC AND DIASTOLIC CHF, NYHA CLASS 4 (HCC): ICD-10-CM

## 2022-01-11 DIAGNOSIS — I48.0 PAF (PAROXYSMAL ATRIAL FIBRILLATION) (HCC): ICD-10-CM

## 2022-01-11 DIAGNOSIS — E11.42 TYPE 2 DIABETES MELLITUS WITH DIABETIC POLYNEUROPATHY, WITHOUT LONG-TERM CURRENT USE OF INSULIN (HCC): ICD-10-CM

## 2022-01-11 DIAGNOSIS — R94.39 ABNORMAL STRESS TEST: Primary | ICD-10-CM

## 2022-01-11 DIAGNOSIS — I10 ESSENTIAL HYPERTENSION: ICD-10-CM

## 2022-01-11 LAB
ANION GAP SERPL CALCULATED.3IONS-SCNC: 12 MMOL/L (ref 9–17)
BUN BLDV-MCNC: 24 MG/DL (ref 8–23)
BUN/CREAT BLD: 24 (ref 9–20)
CALCIUM SERPL-MCNC: 10.1 MG/DL (ref 8.6–10.4)
CHLORIDE BLD-SCNC: 94 MMOL/L (ref 98–107)
CO2: 27 MMOL/L (ref 20–31)
CREAT SERPL-MCNC: 0.98 MG/DL (ref 0.7–1.2)
GFR AFRICAN AMERICAN: >60 ML/MIN
GFR NON-AFRICAN AMERICAN: >60 ML/MIN
GFR SERPL CREATININE-BSD FRML MDRD: ABNORMAL ML/MIN/{1.73_M2}
GFR SERPL CREATININE-BSD FRML MDRD: ABNORMAL ML/MIN/{1.73_M2}
GLUCOSE BLD-MCNC: 85 MG/DL (ref 70–99)
POTASSIUM SERPL-SCNC: 5 MMOL/L (ref 3.7–5.3)
SODIUM BLD-SCNC: 133 MMOL/L (ref 135–144)

## 2022-01-11 PROCEDURE — 1036F TOBACCO NON-USER: CPT | Performed by: FAMILY MEDICINE

## 2022-01-11 PROCEDURE — 99211 OFF/OP EST MAY X REQ PHY/QHP: CPT | Performed by: FAMILY MEDICINE

## 2022-01-11 PROCEDURE — 36415 COLL VENOUS BLD VENIPUNCTURE: CPT

## 2022-01-11 PROCEDURE — G8484 FLU IMMUNIZE NO ADMIN: HCPCS | Performed by: FAMILY MEDICINE

## 2022-01-11 PROCEDURE — 93005 ELECTROCARDIOGRAM TRACING: CPT | Performed by: FAMILY MEDICINE

## 2022-01-11 PROCEDURE — 80048 BASIC METABOLIC PNL TOTAL CA: CPT

## 2022-01-11 PROCEDURE — 1123F ACP DISCUSS/DSCN MKR DOCD: CPT | Performed by: FAMILY MEDICINE

## 2022-01-11 PROCEDURE — G8417 CALC BMI ABV UP PARAM F/U: HCPCS | Performed by: FAMILY MEDICINE

## 2022-01-11 PROCEDURE — 93010 ELECTROCARDIOGRAM REPORT: CPT | Performed by: FAMILY MEDICINE

## 2022-01-11 PROCEDURE — 83036 HEMOGLOBIN GLYCOSYLATED A1C: CPT

## 2022-01-11 PROCEDURE — 99214 OFFICE O/P EST MOD 30 MIN: CPT | Performed by: FAMILY MEDICINE

## 2022-01-11 PROCEDURE — G8427 DOCREV CUR MEDS BY ELIG CLIN: HCPCS | Performed by: FAMILY MEDICINE

## 2022-01-11 PROCEDURE — 4040F PNEUMOC VAC/ADMIN/RCVD: CPT | Performed by: FAMILY MEDICINE

## 2022-01-11 RX ORDER — AMIODARONE HYDROCHLORIDE 200 MG/1
200 TABLET ORAL DAILY
Qty: 90 TABLET | Refills: 3 | Status: SHIPPED | OUTPATIENT
Start: 2022-01-11 | End: 2022-01-27 | Stop reason: SDUPTHER

## 2022-01-11 NOTE — PROGRESS NOTES
Maria Isabel Merino am scribing for and in the presence of Juan Antonio Winter. Pam HART, MS, F.A.C.C. Patient: Fahad Pickett  : 1933  Date of Admission: (Not on file)  Primary Care Physician: Fadumo Churchill  Today's Date: 2022    REASON FOR CONSULTATION: Follow-up (HX:abn stress, PAF, CHF, ASHD PT is here for follow up had stress and echo on 2021 he states he is feeling better since last visit his pulse is steady. occasional lightheadedness denies falls. He had cardioversion on 21. Denies:CP,SOB,palp )    Dear Dr Jeral Hatchet,     HPI:  Mr. Veronika Rojas is a 80 y.o. male who was admitted to the hospital for new onset atrial fibrillation. He has never had this in the past. History of 3 vessel bypass in 2010. No heart attacks. No history of weakened heart in the past. He reports following in the past with Dr. Amanda Dhillon in [facility] but has not seen him in over 3 years. He reports it has been awhile since he's seen them. He had echo done on 2021 which shows EF 50-55%  Mild aortic stenosis with peak and mean gradient of 17 and 10 mmHg although it looks worse on 2D imaging. Mitral annular calcification is seen with a mean gradient of 4.2 mmHg consistent with mild calcific mitral stenosis. Mild to moderate mitral regurgitation was seen. Moderate to severe tricuspid regurgitation. Moderate pulmonary hypertension with an estimated right ventricular systolic pressure of 57 mmHg. Diastolic function cannot be properly assessed due to atrial fibrillation. He also had abnormal stress on 2021 which was Equivocal study and EF 37%. He had successful cardioversion on 2021.     Mr. Veronika Rojas is here for follow up after having successful cardioversion on 2021. He states he is feeling better since last visit. He is down 8 pounds. He denied any current or recent chest pain, abdominal pain, bleeding problems, bowel issues, problems with his medications or any other concerns at this time. Exercise Tolerance: Mr. Jun Dorsey reports that he has a fairly good exercise tolerance. His says that he was able to walk 100 yards today without any significant problems with chest pain or breathing     Past Medical History:   Diagnosis Date    ACE inhibitor-aggravated angioedema 2012    Atrial fibrillation ([address] 12/02/2021    CAD (coronary artery disease)     Enlarged prostate     normal cystoscopy 7/2008 Dr. José Luis Del Castillo hypertension     History of Holter monitoring 2015    symptomatic PACs    Hx of echocardiogram 2015    normal    Hyperlipidemia     Osteoarthritis     Type 2 diabetes mellitus with diabetic polyneuropathy, without long-term current use of insulin (HCC)     Type II or unspecified type diabetes mellitus without mention of complication, not stated as uncontrolled        CURRENT ALLERGIES: Altace [ramipril], Diclofenac sodium, Erythromycin, Naprosyn [naproxen], and Vesicare [solifenacin] REVIEW OF SYSTEMS: 14 systems were reviewed. Pertinent positives and negatives as above, all else negative.      Past Surgical History:   Procedure Laterality Date    BELPHAROPTOSIS REPAIR Bilateral 1/9/2015    CARDIOVASCULAR STRESS TEST  4/2010    Abnormal    COLONOSCOPY  11/2009    Normal    CORONARY ARTERY BYPASS GRAFT  2010    CYSTOURETHROSCOPY  7/10/08    CYSTOURETHROSCOPY  10/92    EYE SURGERY  09/2013    bilateral cataracts    EYE SURGERY Bilateral 01/09/2015    FRACTURE SURGERY  1989    Right arm/leg, pelvis, Left ankle, dislocated hip     PROSTATE SURGERY  10/92    biopsy    Social History:  Social History     Tobacco Use    Smoking status: Never Smoker    Smokeless tobacco: Never Used   Vaping Use    Vaping Use: Never used   Substance Use Topics    Alcohol use: Never    Drug use: No        CURRENT MEDICATIONS:  Outpatient Medications Marked as Taking for the 1/11/22 encounter (Office Visit) with Parul Hunter MD   Medication Sig Dispense Refill    gabapentin (NEURONTIN) 600 MG tablet Take 1 tablet by mouth 2 times daily for 90 days. 180 tablet 3    metoprolol succinate (TOPROL XL) 100 MG extended release tablet Take 0.5 tablets by mouth daily 135 tablet 3    amiodarone (CORDARONE) 200 MG tablet Take 1 tablet by mouth 2 times daily 60 tablet 0    amLODIPine (NORVASC) 5 MG tablet Take 1 tablet by mouth daily 90 tablet 3    furosemide (LASIX) 20 MG tablet Take 1 tablet by mouth daily 60 tablet 3    apixaban (ELIQUIS) 5 MG TABS tablet Take 1 tablet by mouth 2 times daily 60 tablet 3    glipiZIDE-metFORMIN (METAGLIP) 2.5-500 MG per tablet Take 1 tablet by mouth 3 times daily 270 tablet 3    Insulin Pen Needle ([employer] PEN NEEDLES) 32G X 4 MM MISC 1 each by Does not apply route daily 100 each 2    oxybutynin (DITROPAN) 5 MG tablet 1 tab twice daily (Patient taking differently: Take 5 mg by mouth 2 times daily 1 tab twice daily ) 180 tablet 3    Insulin Glargine, 2 Unit Dial, (TOUJEO MAX SOLOSTAR) 300 UNIT/ML SOPN Inject 18 Units into the skin daily (Patient taking differently: Inject 20 Units into the skin daily ) 10 pen 3    blood glucose test strips (ACCU-CHEK LALO PLUS) strip 1 each by Does not apply route daily 50 strip 3    rosuvastatin (CRESTOR) 20 MG tablet Take 1 tablet by mouth daily 90 tablet 3    celecoxib (CELEBREX) 200 MG capsule Take 1 capsule by mouth daily 90 capsule 3    Handicap Placard MISC by Does not apply route Duration; 5 years 1 each 0    Multiple Vitamins-Minerals (VISION FORMULA PO) Take 1 tablet by mouth daily      aspirin 81 MG EC tablet Take 81 mg by mouth daily. FAMILY HISTORY: family history includes Cancer in his father; Heart Disease in his father. PHYSICAL EXAM:   /74 (Site: Left Upper Arm, Position: Sitting, Cuff Size: Medium Adult)   Pulse 59   Resp 18   Ht 5' 10\" (1.778 m)   Wt 178 lb 3.2 oz (80.8 kg)   SpO2 97%   BMI 25.57 kg/m²  Body mass index is 25.57 kg/m².     Constitutional: He is oriented to person, place, and diabetic polyneuropathy, without long-term current use of insulin ([facility] [address] 02/05/2019    ASHD (arteriosclerotic heart disease) 09/29/2015    Essential hypertension 09/29/2015    Mixed hyperlipidemia     BPH (benign prostatic hyperplasia) 01/09/2014    OAB (overactive bladder) 01/09/2014    Enlarged prostate 12/31/2012     PLAN:   Abnormal Stress Test: Equivical: However biggest problem is increased tiredness and fatigue, no chest pain so I suspect this is more cause by new onset atrial fibrillation    We discussed doing a heart catheterization but decided to wait at this time if symptoms persist or worsen.  Medical Management for suspected Coronary artery disease and myocardial ischemia:   Antiplatelet Agent: Continue Aspirin 81 mg daily.  Beta Blocker: Continue Metoprolol succinate (Toprol XL) 50 mg daily.  Anti-anginal medications: Not indicated at this time.  Cholesterol Reduction Therapy: Continue rosuvastatin (Crestor) 20 mg daily.  Additional Testing List: None    · Paroxysmal Atrial Fibrillation: Rate Control Symptomatic.  Beta Blocker: Continue Metoprolol succinate (Toprol XL) 50 mg daily.  Anti-Arrhythmic: Decrease amiodarone (Pacerone) 200 mg daily. Monitoring: Since they will being maintained on Amiodarone, I told them that we will need to closely monitor them for potential side effects. These include monitoring LFTs and TSH at least every 6 months as well as chest x-rays, pulmonary function tests, and eye exams at least yearly.   [id number] Score for Atrial Fibrillation Stroke Risk    Risk   Factors  Component Value   C CHF Yes 1   H HTN Yes 1   A2 Age >= 76 Yes,  (80 y.o.) 2   D DM Yes 1   S2 Prior Stroke/TIA No 0   V Vascular Disease No 0   A Age 74-69 No,  (80 y.o.) 0   Sc Sex male 0    [id number]  Score  5   Score last updated 63/7/85 6:20 PM EST  Click here for a link to the UpToDate guideline \"Atrial Fibrillation: Anticoagulation therapy to prevent embolization  Disclaimer: Risk Score calculation is dependent on accuracy of patient problem list and past encounter diagnosis.  Stroke Risk: CHADS2-VASc Score: 5/9 (6.7% stroke risk)   Anticoagulation: Continue Apixaban (Eliquis) 5 mg every 12 hours.  Additional Testing List: Additional Testing List: I took the liberty of ordering a BMP in 5-7 days to assess their potassium and renal function. I told them that they could get their lab work performed at the location of their choosing, unfortunately, if the lab work was not performed at a [facility]) facility I could not guarantee my ability to follow up with them on their results. and  I took the liberty of ordering a CBC. I told them that they could get their lab work performed at the location of their choosing, unfortunately, if the lab work was not performed at a [facility]) facility I could not guarantee my ability to follow up with them on their results. Acute on chronic combined heart failure: New [place] Heart Association Class: IV (Severe limitations, symptoms even at rest). Down 8 pounds but still 1+ lower extremity edema. · Beta Blocker: Continue Metoprolol succinate (Toprol XL) 50 mg daily. · Diuretics: Continue Lasix 20 mg daily. · Heart failure counseling: I advised them to try and keep their legs up whenever possible and to limit salt in their diet.  Atherosclerotic Heart Disease: History of open heart surgery.  Antiplatelet Agent: Continue Aspirin 81 mg daily.  Beta Blocker: Continue Metoprolol succinate (Toprol XL) 50 mg daily.  Cholesterol Reduction Therapy: Continue rosuvastatin (Crestor) 20 mg daily. · Essential Hypertension: Borderline controlled   Beta Blocker: Continue Metoprolol succinate (Toprol XL) 50 mg daily.  ACE Inibitor/ARB: START sacubitril/valsartan (Entresto) 24/26 mg twice daily.     Calcium Channel Blocker: STOP amlodipine (Norvasc)    Diuretics: Continue furosemide (Lasix) 20 mg every morning.  Additional Testing List: I ordered a echocardiogram to better assess for the etiology of this problem and to help guide future management to be done in a few weeks.  I took the liberty of ordering a BMP in 5-7 days to assess their potassium and renal function. I told them that they could get their lab work performed at the location of their choosing, unfortunately, if the lab work was not performed at a [facility]) facility I could not guarantee my ability to follow up with them on their results. and  I took the liberty of ordering a CBC. I told them that they could get their lab work performed at the location of their choosing, unfortunately, if the lab work was not performed at a [facility]) facility I could not guarantee my ability to follow up with them on their results. Finally, I recommended that he continue his other medications and follow up with you as previously scheduled. FOLLOW UP:   I told Mr. Brian Orozco to call my office if he had any problems, but otherwise told him to Return in about 6 weeks (around 2/22/2022). However, I would be happy to see him sooner should the need arise. Sincerely,  Delvis Carlos. Pam HART, MS, F.A.C.C. [facility] Cardiology Specialist    [address] [place] [address]  Phone: [phone], Fax: [phone]      I believe that the risk of significant morbidity and mortality related to the patient's current medical conditions are: Intermediate. The documentation recorded by the scribe, accurately and completely reflects the services I personally performed and the decisions made by me. Tiana Ross MD, MS, F.A.C.C.  January 11, 2022

## 2022-01-11 NOTE — PATIENT INSTRUCTIONS
SURVEY:    You may be receiving a survey from [employer] regarding your visit today. Please complete the survey to enable us to provide the highest quality of care to you and your family. If you cannot score us a very good on any question, please call the office to discuss how we could have made your experience a very good one. Thank you.

## 2022-01-12 LAB
ESTIMATED AVERAGE GLUCOSE: 154 MG/DL
HBA1C MFR BLD: 7 % (ref 4–6)

## 2022-01-19 ENCOUNTER — HOSPITAL ENCOUNTER (OUTPATIENT)
Age: 87
Discharge: HOME | End: 2022-01-19
Payer: MEDICARE

## 2022-01-19 DIAGNOSIS — R94.39 ABNORMAL STRESS TEST: ICD-10-CM

## 2022-01-19 DIAGNOSIS — I50.43 ACUTE ON CHRONIC COMBINED SYSTOLIC AND DIASTOLIC CHF, NYHA CLASS 4 (HCC): ICD-10-CM

## 2022-01-19 DIAGNOSIS — I10 ESSENTIAL HYPERTENSION: ICD-10-CM

## 2022-01-19 DIAGNOSIS — I48.0 PAF (PAROXYSMAL ATRIAL FIBRILLATION) (HCC): ICD-10-CM

## 2022-01-19 PROBLEM — E44.0 MODERATE MALNUTRITION (HCC): Status: RESOLVED | Noted: 2021-12-03 | Resolved: 2022-01-19

## 2022-01-19 LAB
ANION GAP SERPL CALCULATED.3IONS-SCNC: 13 MMOL/L (ref 9–17)
BUN BLDV-MCNC: 23 MG/DL (ref 8–23)
BUN/CREAT BLD: 22 (ref 9–20)
CALCIUM SERPL-MCNC: 10.2 MG/DL (ref 8.6–10.4)
CHLORIDE BLD-SCNC: 100 MMOL/L (ref 98–107)
CO2: 27 MMOL/L (ref 20–31)
CREAT SERPL-MCNC: 1.04 MG/DL (ref 0.7–1.2)
GFR AFRICAN AMERICAN: >60 ML/MIN
GFR NON-AFRICAN AMERICAN: >60 ML/MIN
GFR SERPL CREATININE-BSD FRML MDRD: ABNORMAL ML/MIN/{1.73_M2}
GFR SERPL CREATININE-BSD FRML MDRD: ABNORMAL ML/MIN/{1.73_M2}
GLUCOSE BLD-MCNC: 144 MG/DL (ref 70–99)
HCT VFR BLD CALC: 47.3 % (ref 40.7–50.3)
HEMOGLOBIN: 15 G/DL (ref 13–17)
MCH RBC QN AUTO: 29.8 PG (ref 25.2–33.5)
MCHC RBC AUTO-ENTMCNC: 31.7 G/DL (ref 28.4–34.8)
MCV RBC AUTO: 94 FL (ref 82.6–102.9)
NRBC AUTOMATED: 0 PER 100 WBC
PDW BLD-RTO: 13.6 % (ref 11.8–14.4)
PLATELET # BLD: 160 K/UL (ref 138–453)
PMV BLD AUTO: 11.1 FL (ref 8.1–13.5)
POTASSIUM SERPL-SCNC: 4.8 MMOL/L (ref 3.7–5.3)
RBC # BLD: 5.03 M/UL (ref 4.21–5.77)
SODIUM BLD-SCNC: 140 MMOL/L (ref 135–144)
WBC # BLD: 7.5 K/UL (ref 3.5–11.3)

## 2022-01-19 PROCEDURE — 85027 COMPLETE CBC AUTOMATED: CPT

## 2022-01-19 PROCEDURE — 80048 BASIC METABOLIC PNL TOTAL CA: CPT

## 2022-01-19 PROCEDURE — 36415 COLL VENOUS BLD VENIPUNCTURE: CPT

## 2022-01-20 ENCOUNTER — TELEPHONE (OUTPATIENT)
Dept: CARDIOLOGY | Age: 87
End: 2022-01-20

## 2022-01-27 ENCOUNTER — TELEPHONE (OUTPATIENT)
Dept: CARDIOLOGY | Age: 87
End: 2022-01-27

## 2022-01-27 DIAGNOSIS — I50.43 ACUTE ON CHRONIC COMBINED SYSTOLIC AND DIASTOLIC CHF, NYHA CLASS 4 (HCC): ICD-10-CM

## 2022-01-27 DIAGNOSIS — I48.0 PAF (PAROXYSMAL ATRIAL FIBRILLATION) (HCC): ICD-10-CM

## 2022-01-27 DIAGNOSIS — R94.39 ABNORMAL STRESS TEST: ICD-10-CM

## 2022-01-27 RX ORDER — AMIODARONE HYDROCHLORIDE 200 MG/1
200 TABLET ORAL DAILY
Qty: 90 TABLET | Refills: 3 | Status: SHIPPED | OUTPATIENT
Start: 2022-01-27

## 2022-02-11 ENCOUNTER — HOSPITAL ENCOUNTER (OUTPATIENT)
Dept: NON INVASIVE DIAGNOSTICS | Age: 87
Discharge: HOME | End: 2022-02-11
Payer: MEDICARE

## 2022-02-11 DIAGNOSIS — I48.0 PAF (PAROXYSMAL ATRIAL FIBRILLATION) (HCC): ICD-10-CM

## 2022-02-11 DIAGNOSIS — R94.39 ABNORMAL STRESS TEST: ICD-10-CM

## 2022-02-11 DIAGNOSIS — I50.43 ACUTE ON CHRONIC COMBINED SYSTOLIC AND DIASTOLIC CHF, NYHA CLASS 4 (HCC): ICD-10-CM

## 2022-02-11 LAB
LV EF: 55 %
LVEF MODALITY: NORMAL

## 2022-02-11 PROCEDURE — 93306 TTE W/DOPPLER COMPLETE: CPT

## 2022-02-14 ENCOUNTER — TELEPHONE (OUTPATIENT)
Dept: CARDIOLOGY | Age: 87
End: 2022-02-14

## 2022-02-14 NOTE — TELEPHONE ENCOUNTER
----- Message from Ann Marie Chun MD sent at 2/11/2022 10:26 PM EST -----  Let Mr. Gamal Carter know their test result was ok. Will discuss at next visit. Thanks.

## 2022-02-23 ENCOUNTER — OFFICE VISIT (OUTPATIENT)
Dept: CARDIOLOGY | Age: 87
End: 2022-02-23
Payer: MEDICARE

## 2022-02-23 VITALS
SYSTOLIC BLOOD PRESSURE: 111 MMHG | HEART RATE: 60 BPM | DIASTOLIC BLOOD PRESSURE: 58 MMHG | HEIGHT: 70 IN | OXYGEN SATURATION: 98 % | WEIGHT: 182.6 LBS | BODY MASS INDEX: 26.14 KG/M2 | RESPIRATION RATE: 17 BRPM

## 2022-02-23 DIAGNOSIS — I25.10 ASHD (ARTERIOSCLEROTIC HEART DISEASE): ICD-10-CM

## 2022-02-23 DIAGNOSIS — I48.0 PAF (PAROXYSMAL ATRIAL FIBRILLATION) (HCC): Primary | ICD-10-CM

## 2022-02-23 DIAGNOSIS — I95.1 ORTHOSTATIC HYPOTENSION: ICD-10-CM

## 2022-02-23 DIAGNOSIS — Z79.01 CHRONIC ANTICOAGULATION: ICD-10-CM

## 2022-02-23 DIAGNOSIS — Z79.899 ON AMIODARONE THERAPY: ICD-10-CM

## 2022-02-23 DIAGNOSIS — Z79.01 ENCOUNTER FOR CURRENT LONG-TERM USE OF ANTICOAGULANTS: ICD-10-CM

## 2022-02-23 DIAGNOSIS — I50.43 ACUTE ON CHRONIC COMBINED SYSTOLIC AND DIASTOLIC CONGESTIVE HEART FAILURE (HCC): ICD-10-CM

## 2022-02-23 DIAGNOSIS — I10 PRIMARY HYPERTENSION: ICD-10-CM

## 2022-02-23 PROCEDURE — 99214 OFFICE O/P EST MOD 30 MIN: CPT | Performed by: FAMILY MEDICINE

## 2022-02-23 PROCEDURE — G8484 FLU IMMUNIZE NO ADMIN: HCPCS | Performed by: FAMILY MEDICINE

## 2022-02-23 PROCEDURE — 1036F TOBACCO NON-USER: CPT | Performed by: FAMILY MEDICINE

## 2022-02-23 PROCEDURE — G8427 DOCREV CUR MEDS BY ELIG CLIN: HCPCS | Performed by: FAMILY MEDICINE

## 2022-02-23 PROCEDURE — 1123F ACP DISCUSS/DSCN MKR DOCD: CPT | Performed by: FAMILY MEDICINE

## 2022-02-23 PROCEDURE — 4040F PNEUMOC VAC/ADMIN/RCVD: CPT | Performed by: FAMILY MEDICINE

## 2022-02-23 PROCEDURE — 99211 OFF/OP EST MAY X REQ PHY/QHP: CPT | Performed by: FAMILY MEDICINE

## 2022-02-23 PROCEDURE — G8417 CALC BMI ABV UP PARAM F/U: HCPCS | Performed by: FAMILY MEDICINE

## 2022-02-23 RX ORDER — FUROSEMIDE 20 MG/1
10 TABLET ORAL 2 TIMES DAILY
Qty: 180 TABLET | Refills: 1
Start: 2022-02-23 | End: 2022-05-13 | Stop reason: SDUPTHER

## 2022-02-23 NOTE — PATIENT INSTRUCTIONS
SURVEY:    You may be receiving a survey from [email or web address] regarding your visit today. Please complete the survey to enable us to provide the highest quality of care to you and your family. If you cannot score us a very good on any question, please call the office to discuss how we could have made your experience a very good one. Thank you.

## 2022-02-23 NOTE — PROGRESS NOTES
Ludivina Garvey am scribing for and in the presence of Ilana Alamo. Pam HART, MS, F.A.C.C. Patient: Arlyn Brown  : 1933  Primary Care Physician: Ani Sotelo  Today's Date: 2022    REASON FOR CONSULTATION: Follow-up (Hx: CHF,PAF,HTN,HLD,Suspected CAD. Echo . He has been tired and don't feel like himself. Lightheaded/dizziness if he stands too quick. Denies: CP, SOB, Palpitations. )    Dear Dr Humberto Ortiz,     HPI:  Mr. Erum Johnson is a 80 y.o. male who was admitted to the hospital for new onset atrial fibrillation. He has never had this in the past. History of 3 vessel bypass in 2010. No heart attacks. No history of weakened heart in the past. He reports following in the past with Dr. Camilo Calixto in [facility] but has not seen him in over 3 years. He reports it has been awhile since he's seen them. He had echo done on 2021 which shows EF 50-55%  Mild aortic stenosis with peak and mean gradient of 17 and 10 mmHg although it looks worse on 2D imaging. Mitral annular calcification is seen with a mean gradient of 4.2 mmHg consistent with mild calcific mitral stenosis. Mild to moderate mitral regurgitation was seen. Moderate to severe tricuspid regurgitation. Moderate pulmonary hypertension with an estimated right ventricular systolic pressure of 57 mmHg. Diastolic function cannot be properly assessed due to atrial fibrillation. He also had abnormal stress on 2021 which was Equivocal study and EF 37%. He had successful cardioversion on 2021. Echocardiogram done on 2022: EF of 55% LV wall thickness is mildly increased. Severe bi-atrial enlargement. Mild to moderate mitral regurgitation. Moderate to severe tricuspid regurgitation. Moderate pulmonary hypertension with an estimated right ventricular systolic pressure of 61 mmHg. Moderate pulmonic regurgitation.  Moderate diastolic dysfunction is seen.      Since I last saw Mr. Erum Johnson he reports he has been feeling about the same, he's just been tired and not not feeling like himself. He is up 4 pounds since his last visit here. He does have some lightheaded/dizziness if he stands up too quick and when he tries to do things like trim a tree he says his legs feel like \"sponges. \"     He denied any current or recent chest pain, pressure or tightness. He denies having any shortness of breath or palpitations. No abdominal pain, bleeding problems, bowel issues, problems with his medications or any other concerns at this time. No cough, fever or chills. No nausea or vomiting. No leg edema. Exercise Tolerance: Mr. Alexander Bullock reports that he has a fairly good exercise tolerance. His says that he was able to walk 100 yards today without any significant problems with chest pain or breathing     Past Medical History:   Diagnosis Date    ACE inhibitor-aggravated angioedema 2012    Atrial fibrillation ([address] 12/02/2021    CAD (coronary artery disease)     Enlarged prostate     normal cystoscopy 7/2008 Dr. Morena Khan hypertension     History of Holter monitoring 2015    symptomatic PACs    Hx of echocardiogram 2015    normal    Hyperlipidemia     Osteoarthritis     Type 2 diabetes mellitus with diabetic polyneuropathy, without long-term current use of insulin (HCC)     Type II or unspecified type diabetes mellitus without mention of complication, not stated as uncontrolled        CURRENT ALLERGIES: Altace [ramipril], Diclofenac sodium, Erythromycin, Naprosyn [naproxen], and Vesicare [solifenacin] REVIEW OF SYSTEMS: 14 systems were reviewed. Pertinent positives and negatives as above, all else negative.      Past Surgical History:   Procedure Laterality Date    BELPHAROPTOSIS REPAIR Bilateral 1/9/2015    CARDIOVASCULAR STRESS TEST  4/2010    Abnormal    COLONOSCOPY  11/2009    Normal    CORONARY ARTERY BYPASS GRAFT  2010    CYSTOURETHROSCOPY  7/10/08    CYSTOURETHROSCOPY  10/92    EYE SURGERY  09/2013    bilateral cataracts  EYE SURGERY Bilateral 01/09/2015    FRACTURE SURGERY  1989    Right arm/leg, pelvis, Left ankle, dislocated hip     PROSTATE SURGERY  10/92    biopsy    Social History:  Social History     Tobacco Use    Smoking status: Never Smoker    Smokeless tobacco: Never Used   Vaping Use    Vaping Use: Never used   Substance Use Topics    Alcohol use: Never    Drug use: No        CURRENT MEDICATIONS:  Outpatient Medications Marked as Taking for the 2/23/22 encounter (Office Visit) with Nadya Dhillon MD   Medication Sig Dispense Refill    amiodarone (CORDARONE) 200 MG tablet Take 1 tablet by mouth daily 90 tablet 3    rosuvastatin (CRESTOR) 20 MG tablet Take 1 tablet by mouth daily 90 tablet 3    celecoxib (CELEBREX) 200 MG capsule TAKE 1 CAPSULE DAILY 90 capsule 3    blood glucose test strips (ACCU-CHEK LALO PLUS) strip 1 each by Does not apply route daily 50 strip 3    sacubitril-valsartan (ENTRESTO) 24-26 MG per tablet Take 1 tablet by mouth 2 times daily 180 tablet 3    gabapentin (NEURONTIN) 600 MG tablet Take 1 tablet by mouth 2 times daily for 90 days.  180 tablet 3    metoprolol succinate (TOPROL XL) 100 MG extended release tablet Take 0.5 tablets by mouth daily 135 tablet 3    furosemide (LASIX) 20 MG tablet Take 1 tablet by mouth daily 60 tablet 3    apixaban (ELIQUIS) 5 MG TABS tablet Take 1 tablet by mouth 2 times daily 60 tablet 3    glipiZIDE-metFORMIN (METAGLIP) 2.5-500 MG per tablet Take 1 tablet by mouth 3 times daily 270 tablet 3    Insulin Pen Needle ([employer] PEN NEEDLES) 32G X 4 MM MISC 1 each by Does not apply route daily 100 each 2    oxybutynin (DITROPAN) 5 MG tablet 1 tab twice daily (Patient taking differently: Take 5 mg by mouth 2 times daily 1 tab twice daily ) 180 tablet 3    Insulin Glargine, 2 Unit Dial, (TOUJEO MAX SOLOSTAR) 300 UNIT/ML SOPN Inject 18 Units into the skin daily (Patient taking differently: Inject 20 Units into the skin daily ) 10 pen 3    Handicap Placard MISC by Does not apply route Duration; 5 years 1 each 0    Multiple Vitamins-Minerals (VISION FORMULA PO) Take 1 tablet by mouth daily      aspirin 81 MG EC tablet Take 81 mg by mouth daily. FAMILY HISTORY: family history includes Cancer in his father; Heart Disease in his father. PHYSICAL EXAM:   BP (!) 111/58 (Site: Left Upper Arm, Position: Sitting, Cuff Size: Medium Adult)   Pulse 60   Resp 17   Ht 5' 10\" (1.778 m)   Wt 182 lb 9.6 oz (82.8 kg)   SpO2 98%   BMI 26.20 kg/m²  Body mass index is 26.2 kg/m². Constitutional: He is oriented to person, place, and time. He appears well-developed and well-nourished. In no acute distress. HEENT: Normocephalic and atraumatic. No JVD present. Carotid bruit is not present. No mass and no thyromegaly present. No lymphadenopathy present. Cardiovascular: Normal rate, regular rhythm, normal heart sounds. Exam reveals no gallop and no friction rubs. 4/6 systolic murmur, 2nd intercostal space on the RIGHT just lateral to the sternum. Pulmonary/Chest: Effort normal and breath sounds normal. No respiratory distress. He has no wheezes, rhonchi or rales. Abdominal: Soft, non-tender. Bowel sounds and aorta are normal. He exhibits no organomegaly, mass or bruit. Extremities: None. No cyanosis or clubbing. 2+ radial and carotid pulses. Distal extremity pulses: 2+ bilaterally. Neurological: He is alert and oriented to person, place, and time. No evidence of gross cranial nerve deficit. Coordination appeared normal.   Skin: Skin is warm and dry. There is no rash or diaphoresis. Psychiatric: He has a normal mood and affect.  His speech is normal and behavior is normal.      MOST RECENT LABS ON RECORD:   Lab Results   Component Value Date    WBC 7.5 01/19/2022    HGB 15.0 01/19/2022    HCT 47.3 01/19/2022     01/19/2022    CHOL 114 01/28/2019    TRIG 96 01/28/2019    HDL 37 (L) 07/09/2021    ALT 41 12/03/2021    AST 19 12/03/2021     01/19/2022 K 4.8 01/19/2022     01/19/2022    CREATININE 1.04 01/19/2022    BUN 23 01/19/2022    CO2 27 01/19/2022    TSH 2.62 12/02/2021    PSA 0.91 12/24/2012    GLUF 96 07/09/2021    LABA1C 7.0 (H) 01/11/2022    LABMICR 20 (H) 07/09/2021         ASSESSMENT:  Patient Active Problem List    Diagnosis Date Noted    Chronic combined systolic and diastolic heart failure, NYHA class 3 ([facility])     New onset a-fib ([facility] 12/02/2021    PAF (paroxysmal atrial fibrillation) ([facility] 12/02/2021    Type 2 diabetes mellitus with diabetic polyneuropathy, without long-term current use of insulin ([facility] 02/05/2019    ASHD (arteriosclerotic heart disease) 09/29/2015    Essential hypertension 09/29/2015    Mixed hyperlipidemia     BPH (benign prostatic hyperplasia) 01/09/2014    OAB (overactive bladder) 01/09/2014    Enlarged prostate 12/31/2012      Diagnosis Orders   1. PAF (paroxysmal atrial fibrillation) ([facility])  sacubitril-valsartan (ENTRESTO) 24-26 MG per tablet    furosemide (LASIX) 20 MG tablet   2. Chronic anticoagulation  sacubitril-valsartan (ENTRESTO) 24-26 MG per tablet    furosemide (LASIX) 20 MG tablet   3. On amiodarone therapy  sacubitril-valsartan (ENTRESTO) 24-26 MG per tablet    furosemide (LASIX) 20 MG tablet   4. Acute on chronic combined systolic and diastolic congestive heart failure (HCC)  sacubitril-valsartan (ENTRESTO) 24-26 MG per tablet    furosemide (LASIX) 20 MG tablet   5. ASHD (arteriosclerotic heart disease)  sacubitril-valsartan (ENTRESTO) 24-26 MG per tablet    furosemide (LASIX) 20 MG tablet   6. Primary hypertension  sacubitril-valsartan (ENTRESTO) 24-26 MG per tablet    furosemide (LASIX) 20 MG tablet   7. Orthostatic hypotension     8.  Encounter for current long-term use of anticoagulants       PLAN:     Abnormal Stress Test: Equivical: However biggest problem is increased tiredness and fatigue, no chest pain so I suspect this is more cause by new onset atrial fibrillation    We discussed doing a heart catheterization but decided to wait at this time but if symptoms persist or worsen.  Medical Management for suspected Coronary artery disease and myocardial ischemia: Likely stable, as above   Antiplatelet Agent: Continue Aspirin 81 mg daily.  Beta Blocker: Continue Metoprolol succinate (Toprol XL) 50 mg daily.  Anti-anginal medications: Not indicated at this time.  Cholesterol Reduction Therapy: Continue rosuvastatin (Crestor) 20 mg daily.  Additional Testing List: None    · Paroxysmal Atrial Fibrillation: Rate Control Symptomatic.  Beta Blocker: Continue Metoprolol succinate (Toprol XL) 50 mg daily.  Anti-Arrhythmic: Continue amiodarone (Pacerone) 200 mg daily.  Monitoring: Since they will being maintained on Amiodarone, I told them that we will need to closely monitor them for potential side effects. These include monitoring LFTs and TSH at least every 6 months as well as chest x-rays, pulmonary function tests, and eye exams at least yearly. [id number] Score for Atrial Fibrillation Stroke Risk    Risk   Factors  Component Value   C CHF Yes 1   H HTN Yes 1   A2 Age >= 76 Yes,  (80 y.o.) 2   D DM Yes 1   S2 Prior Stroke/TIA No 0   V Vascular Disease No 0   A Age 74-69 No,  (80 y.o.) 0   Sc Sex male 0    [id number]  Score  5   Score last updated 87/7/19 4:11 PM EST  Click here for a link to the UpToDate guideline \"Atrial Fibrillation: Anticoagulation therapy to prevent embolization  Disclaimer: Risk Score calculation is dependent on accuracy of patient problem list and past encounter diagnosis.  Stroke Risk: CHADS2-VASc Score: 5/9 (6.7% stroke risk)   Anticoagulation: Continue Apixaban (Eliquis) 5 mg every 12 hours.   Because of his atrial fibrillation, I also would also recommend that he continue with anticoagulation to decrease his risk of stoke but also reminded him to watch for signs of blood in his stool or black tarry stools and stop the medication immediately if this develops as this could be life threatening.   Additional Testing List: None     Acute on chronic combined heart failure: [place] Heart Association Class: IV   · Beta Blocker: Continue Metoprolol succinate (Toprol XL) 50 mg daily. · Diuretics: DECREASE to furosemide (Lasix) 10  mg daily. · Heart failure counseling: I advised them to try and keep their legs up whenever possible and to limit salt in their diet.  Atherosclerotic Heart Disease: History of open heart surgery.  Antiplatelet Agent: Continue Aspirin 81 mg daily.  Beta Blocker: Continue Metoprolol succinate (Toprol XL) 50 mg daily.  Cholesterol Reduction Therapy: Continue rosuvastatin (Crestor) 20 mg daily. · Essential Hypertension: Controlled   Beta Blocker: Continue Metoprolol succinate (Toprol XL) 50 mg daily.  ACE Inibitor/ARB: DECREASE to sacubitril/valsartan (Entresto) 24/26 mg 1/2 tablet twice daily.  Calcium Channel Blocker: Not indicated at this time.  Diuretics: DECREASE to furosemide (Lasix) 10 mg daily.  Additional Testing List: None     · Orthostatic hypotension: Cut lasix to 10 mg daily and Entresto down to 1/2 tab bid as above. Finally, I recommended that he continue his other medications and follow up with you as previously scheduled. FOLLOW UP:   I told Mr. Christina Boyce to call my office if he had any problems, but otherwise told him to Return in about 4 weeks (around 3/23/2022). However, I would be happy to see him sooner should the need arise. Sincerely,  Viry Orozco MD, MS, F.A.C.C. [facility]    [address]  [facility], [address] [place] [address]  Phone: [phone], Fax: [phone]      I believe that the risk of significant morbidity and mortality related to the patient's current medical conditions are: Intermediate.  Between 30 and 44 minutes were spent during prep work, discussion and exam of the patient, and follow up documentation and all of their questions were answered. The documentation recorded by the scribe, accurately and completely reflects the services I personally performed and the decisions made by me. Matthew Bang MD, MS, F.A.C.C.  February 23, 2022

## 2022-03-04 DIAGNOSIS — I48.0 PAF (PAROXYSMAL ATRIAL FIBRILLATION) (HCC): Primary | ICD-10-CM

## 2022-03-16 ENCOUNTER — TELEPHONE (OUTPATIENT)
Dept: CARDIOLOGY | Age: 87
End: 2022-03-16

## 2022-03-16 DIAGNOSIS — Z79.01 CHRONIC ANTICOAGULATION: ICD-10-CM

## 2022-03-16 DIAGNOSIS — I10 PRIMARY HYPERTENSION: ICD-10-CM

## 2022-03-16 DIAGNOSIS — I48.0 PAF (PAROXYSMAL ATRIAL FIBRILLATION) (HCC): ICD-10-CM

## 2022-03-16 DIAGNOSIS — I50.43 ACUTE ON CHRONIC COMBINED SYSTOLIC AND DIASTOLIC CONGESTIVE HEART FAILURE (HCC): ICD-10-CM

## 2022-03-16 DIAGNOSIS — Z79.899 ON AMIODARONE THERAPY: ICD-10-CM

## 2022-03-16 DIAGNOSIS — I25.10 ASHD (ARTERIOSCLEROTIC HEART DISEASE): ICD-10-CM

## 2022-03-16 RX ORDER — METOPROLOL SUCCINATE 100 MG/1
50 TABLET, EXTENDED RELEASE ORAL DAILY
Qty: 90 TABLET | Refills: 3 | Status: SHIPPED | OUTPATIENT
Start: 2022-03-16 | End: 2022-06-29

## 2022-03-28 ENCOUNTER — OFFICE VISIT (OUTPATIENT)
Dept: CARDIOLOGY | Age: 87
End: 2022-03-28
Payer: MEDICARE

## 2022-03-28 VITALS
DIASTOLIC BLOOD PRESSURE: 75 MMHG | OXYGEN SATURATION: 98 % | HEIGHT: 70 IN | HEART RATE: 84 BPM | WEIGHT: 183 LBS | RESPIRATION RATE: 17 BRPM | SYSTOLIC BLOOD PRESSURE: 151 MMHG | BODY MASS INDEX: 26.2 KG/M2

## 2022-03-28 DIAGNOSIS — I95.1 ORTHOSTATIC HYPOTENSION: ICD-10-CM

## 2022-03-28 DIAGNOSIS — R94.39 ABNORMAL STRESS TEST: Primary | ICD-10-CM

## 2022-03-28 DIAGNOSIS — Z79.01 ENCOUNTER FOR CURRENT LONG-TERM USE OF ANTICOAGULANTS: ICD-10-CM

## 2022-03-28 DIAGNOSIS — I10 PRIMARY HYPERTENSION: ICD-10-CM

## 2022-03-28 DIAGNOSIS — Z79.01 CHRONIC ANTICOAGULATION: ICD-10-CM

## 2022-03-28 DIAGNOSIS — I48.0 PAF (PAROXYSMAL ATRIAL FIBRILLATION) (HCC): ICD-10-CM

## 2022-03-28 DIAGNOSIS — I50.43 ACUTE ON CHRONIC COMBINED SYSTOLIC AND DIASTOLIC CONGESTIVE HEART FAILURE (HCC): ICD-10-CM

## 2022-03-28 DIAGNOSIS — Z79.899 ON AMIODARONE THERAPY: ICD-10-CM

## 2022-03-28 DIAGNOSIS — I25.10 ASHD (ARTERIOSCLEROTIC HEART DISEASE): ICD-10-CM

## 2022-03-28 PROCEDURE — 99211 OFF/OP EST MAY X REQ PHY/QHP: CPT

## 2022-03-28 PROCEDURE — 1123F ACP DISCUSS/DSCN MKR DOCD: CPT | Performed by: FAMILY MEDICINE

## 2022-03-28 PROCEDURE — 4040F PNEUMOC VAC/ADMIN/RCVD: CPT | Performed by: FAMILY MEDICINE

## 2022-03-28 PROCEDURE — 1036F TOBACCO NON-USER: CPT | Performed by: FAMILY MEDICINE

## 2022-03-28 PROCEDURE — 99214 OFFICE O/P EST MOD 30 MIN: CPT | Performed by: FAMILY MEDICINE

## 2022-03-28 PROCEDURE — G8417 CALC BMI ABV UP PARAM F/U: HCPCS | Performed by: FAMILY MEDICINE

## 2022-03-28 PROCEDURE — G8484 FLU IMMUNIZE NO ADMIN: HCPCS | Performed by: FAMILY MEDICINE

## 2022-03-28 PROCEDURE — G8427 DOCREV CUR MEDS BY ELIG CLIN: HCPCS | Performed by: FAMILY MEDICINE

## 2022-03-28 NOTE — PROGRESS NOTES
Tosin Rodriguez am scribing for and in the presence of Brain Sin. Pam HART, MS, F.A.C.C. Patient: Shaquille Willoughby  : 1933  Primary Care Physician: Shannon Shannon  Today's Date: 3/28/2022    REASON FOR CONSULTATION: Follow-up (Hx: CHF,PAF,HTN,HLD,Suspected CAD. Echo on 22. He has been doing okay. Some leg weakness with walking. Denies: CP, SOB, Lightheaded/dizziness, Palpitations. )    Dear Dr Cherry Shannon,     HPI:  Mr. Isis iWnters is a 80 y.o. male who was admitted to the hospital for new onset atrial fibrillation. He has never had this in the past. History of 3 vessel bypass in 2010. No heart attacks. No history of weakened heart in the past. He reports following in the past with Dr. Lara Casas in [facility] but has not seen him in over 3 years. He reports it has been awhile since he's seen them. He had echo done on 2021 which shows EF 50-55%  Mild aortic stenosis with peak and mean gradient of 17 and 10 mmHg although it looks worse on 2D imaging. Mitral annular calcification is seen with a mean gradient of 4.2 mmHg consistent with mild calcific mitral stenosis. Mild to moderate mitral regurgitation was seen. Moderate to severe tricuspid regurgitation. Moderate pulmonary hypertension with an estimated right ventricular systolic pressure of 57 mmHg. Diastolic function cannot be properly assessed due to atrial fibrillation. He also had abnormal stress on 2021 which was Equivocal study and EF 37%. He had successful cardioversion on 2021. Echocardiogram done on 2022: EF of 55% LV wall thickness is mildly increased. Severe bi-atrial enlargement. Mild to moderate mitral regurgitation. Moderate to severe tricuspid regurgitation. Moderate pulmonary hypertension with an estimated right ventricular systolic pressure of 61 mmHg. Moderate pulmonic regurgitation. Moderate diastolic dysfunction is seen.      Since I last saw Mr. Isis Winters he reports he has been feeling about the same. 10/92    EYE SURGERY  09/2013    bilateral cataracts    EYE SURGERY Bilateral 01/09/2015    FRACTURE SURGERY  1989    Right arm/leg, pelvis, Left ankle, dislocated hip     PROSTATE SURGERY  10/92    biopsy    Social History:  Social History     Tobacco Use    Smoking status: Never Smoker    Smokeless tobacco: Never Used   Vaping Use    Vaping Use: Never used   Substance Use Topics    Alcohol use: Never    Drug use: No        CURRENT MEDICATIONS:  Outpatient Medications Marked as Taking for the 3/28/22 encounter (Office Visit) with Ann Marie Chun MD   Medication Sig Dispense Refill    sacubitril-valsartan (ENTRESTO) 24-26 MG per tablet Take 0.5 tablets by mouth 2 times daily 90 tablet 3    metoprolol succinate (TOPROL XL) 100 MG extended release tablet Take 0.5 tablets by mouth daily 90 tablet 3    apixaban (ELIQUIS) 5 MG TABS tablet Take 1 tablet by mouth 2 times daily 180 tablet 3    furosemide (LASIX) 20 MG tablet Take 0.5 tablets by mouth 2 times daily 180 tablet 1    amiodarone (CORDARONE) 200 MG tablet Take 1 tablet by mouth daily 90 tablet 3    rosuvastatin (CRESTOR) 20 MG tablet Take 1 tablet by mouth daily 90 tablet 3    celecoxib (CELEBREX) 200 MG capsule TAKE 1 CAPSULE DAILY 90 capsule 3    blood glucose test strips (ACCU-CHEK LALO PLUS) strip 1 each by Does not apply route daily 50 strip 3    gabapentin (NEURONTIN) 600 MG tablet Take 1 tablet by mouth 2 times daily for 90 days.  180 tablet 3    glipiZIDE-metFORMIN (METAGLIP) 2.5-500 MG per tablet Take 1 tablet by mouth 3 times daily 270 tablet 3    Insulin Pen Needle ([employer] PEN NEEDLES) 32G X 4 MM MISC 1 each by Does not apply route daily 100 each 2    oxybutynin (DITROPAN) 5 MG tablet 1 tab twice daily (Patient taking differently: Take 5 mg by mouth 2 times daily 1 tab twice daily ) 180 tablet 3    Insulin Glargine, 2 Unit Dial, (TOUJEO MAX SOLOSTAR) 300 UNIT/ML SOPN Inject 18 Units into the skin daily (Patient taking differently: Inject 20 Units into the skin daily ) 10 pen 3    Handicap Placard MISC by Does not apply route Duration; 5 years 1 each 0    Multiple Vitamins-Minerals (VISION FORMULA PO) Take 1 tablet by mouth daily      aspirin 81 MG EC tablet Take 81 mg by mouth daily. FAMILY HISTORY: family history includes Cancer in his father; Heart Disease in his father. PHYSICAL EXAM:   BP (!) 154/69 (Site: Right Upper Arm, Position: Sitting, Cuff Size: Medium Adult)   Pulse 75   Resp 17   Ht 5' 10\" (1.778 m)   Wt 183 lb (83 kg)   SpO2 98%   BMI 26.26 kg/m²  Body mass index is 26.26 kg/m². Constitutional: He is oriented to person, place, and time. He appears well-developed and well-nourished. In no acute distress. HEENT: Normocephalic and atraumatic. No JVD present. Carotid bruit is not present. No mass and no thyromegaly present. No lymphadenopathy present. Cardiovascular: Normal rate, regular rhythm, normal heart sounds. Exam reveals no gallop and no friction rubs. 3/6 systolic murmur, 5th intercostal space on the LEFT in the mid-clavicular line (cardiac apex). Pulmonary/Chest: Effort normal and breath sounds normal. No respiratory distress. He has no wheezes, rhonchi or rales. Abdominal: Soft, non-tender. Bowel sounds and aorta are normal. He exhibits no organomegaly, mass or bruit. Extremities: Trace. No cyanosis or clubbing. 2+ radial and carotid pulses. Distal extremity pulses: 2+ bilaterally. Neurological: He is alert and oriented to person, place, and time. No evidence of gross cranial nerve deficit. Coordination appeared normal.   Skin: Skin is warm and dry. There is no rash or diaphoresis. Psychiatric: He has a normal mood and affect.  His speech is normal and behavior is normal.      MOST RECENT LABS ON RECORD:   Lab Results   Component Value Date    WBC 7.5 01/19/2022    HGB 15.0 01/19/2022    HCT 47.3 01/19/2022     01/19/2022    CHOL 114 01/28/2019    TRIG 96 01/28/2019    HDL 37 (L) 07/09/2021    ALT 41 12/03/2021    AST 19 12/03/2021     01/19/2022    K 4.8 01/19/2022     01/19/2022    CREATININE 1.04 01/19/2022    BUN 23 01/19/2022    CO2 27 01/19/2022    TSH 2.62 12/02/2021    PSA 0.91 12/24/2012    GLUF 96 07/09/2021    LABA1C 7.0 (H) 01/11/2022    LABMICR 20 (H) 07/09/2021     ASSESSMENT:     Diagnosis Orders   1. Abnormal stress test     2. PAF (paroxysmal atrial fibrillation) (HCC)  sacubitril-valsartan (ENTRESTO) 24-26 MG per tablet   3. Acute on chronic combined systolic and diastolic congestive heart failure (HCC)  sacubitril-valsartan (ENTRESTO) 24-26 MG per tablet   4. Encounter for current long-term use of anticoagulants     5. ASHD (arteriosclerotic heart disease)  sacubitril-valsartan (ENTRESTO) 24-26 MG per tablet   6. Primary hypertension  sacubitril-valsartan (ENTRESTO) 24-26 MG per tablet   7. Orthostatic hypotension     8. Chronic anticoagulation  sacubitril-valsartan (ENTRESTO) 24-26 MG per tablet   9. On amiodarone therapy  sacubitril-valsartan (ENTRESTO) 24-26 MG per tablet     PLAN:   Abnormal Stress Test: Equivical: However biggest problem is increased tiredness and fatigue, no chest pain so I suspect this is more cause by new onset atrial fibrillation    I again offered him the option to have heart cath done and he states he would like to wait a few more months and if symptoms worsen.  Medical Management for suspected Coronary artery disease and myocardial ischemia: Likely stable, as above   Antiplatelet Agent: Continue Aspirin 81 mg daily.  Beta Blocker: Continue Metoprolol succinate (Toprol XL) 50 mg daily.  Anti-anginal medications: Not indicated at this time.  Cholesterol Reduction Therapy: Continue rosuvastatin (Crestor) 20 mg daily.  Additional Testing List: None    · Paroxysmal Atrial Fibrillation: Rate Control Symptomatic.  Beta Blocker: Continue Metoprolol succinate (Toprol XL) 50 mg daily.     Anti-Arrhythmic: Continue amiodarone (Pacerone) 200 mg daily.  Monitoring: Since they will being maintained on Amiodarone, I told them that we will need to closely monitor them for potential side effects. These include monitoring LFTs and TSH at least every 6 months as well as chest x-rays, pulmonary function tests, and eye exams at least yearly. [id number] Score for Atrial Fibrillation Stroke Risk    Risk   Factors  Component Value   C CHF Yes 1   H HTN Yes 1   A2 Age >= 76 Yes,  (80 y.o.) 2   D DM Yes 1   S2 Prior Stroke/TIA No 0   V Vascular Disease No 0   A Age 74-69 No,  (80 y.o.) 0   Sc Sex male 0    [id number]  Score  5   Score last updated 65/5/53 4:64 PM EST  Click here for a link to the UpToDate guideline \"Atrial Fibrillation: Anticoagulation therapy to prevent embolization  Disclaimer: Risk Score calculation is dependent on accuracy of patient problem list and past encounter diagnosis.  Stroke Risk: CHADS2-VASc Score: 5/9 (6.7% stroke risk)   Anticoagulation: Continue Apixaban (Eliquis) 5 mg every 12 hours. Because of his atrial fibrillation, I also would also recommend that he continue with anticoagulation to decrease his risk of stoke but also reminded him to watch for signs of blood in his stool or black tarry stools and stop the medication immediately if this develops as this could be life threatening.   Additional Testing List: None     Acute on chronic combined heart failure: [place] Heart Association Class: III but probably his baseline  · Beta Blocker: Continue Metoprolol succinate (Toprol XL) 50 mg daily. · Diuretics: Continue furosemide (Lasix) 10 mg   mg daily. · Heart failure counseling: I advised them to try and keep their legs up whenever possible and to limit salt in their diet.  Atherosclerotic Heart Disease: History of open heart surgery.  Antiplatelet Agent: Continue Aspirin 81 mg daily.  Beta Blocker: Continue Metoprolol succinate (Toprol XL) 50 mg daily.     Cholesterol Reduction Therapy: Continue rosuvastatin (Crestor) 20 mg daily. · Essential Hypertension: Controlled   Beta Blocker: Continue Metoprolol succinate (Toprol XL) 50 mg daily.  ACE Inibitor/ARB: Increase to sacubitril/valsartan (Entresto) 24/26 mg 1 tablet twice daily.  Calcium Channel Blocker: Not indicated at this time.  Diuretics: Continue furosemide (Lasix) 10 mg  mg daily.  Additional Testing List: None     · History of Orthostatic hypotension: Continue lasix to 10 mg daily and increase Entresto to 24-26 mg bid as above. Finally, I recommended that he continue his other medications and follow up with you as previously scheduled. FOLLOW UP:   I told Mr. Beryl Valles to call my office if he had any problems, but otherwise told him to Return in about 3 months (around 6/28/2022). However, I would be happy to see him sooner should the need arise. Sincerely,  Lachelle Dhillon. Pam HART, MS, F.A.C.C. [facility] Special[facility]    [address] [place] [address], [address] [place] [address]  Phone: [phone], Fax: [phone]      I believe that the risk of significant morbidity and mortality related to the patient's current medical conditions are: Intermediate. The documentation recorded by the scribe, accurately and completely reflects the services I personally performed and the decisions made by me. Sumit Rosenthal MD, MS, F.A.C.C.  March 28, 2022

## 2022-03-28 NOTE — PATIENT INSTRUCTIONS
SURVEY:    You may be receiving a survey from [email or web address] regarding your visit today. Please complete the survey to enable us to provide the highest quality of care to you and your family. If you cannot score us a very good on any question, please call the office to discuss how we could have made your experience a very good one. Thank you.

## 2022-05-13 DIAGNOSIS — I25.10 ASHD (ARTERIOSCLEROTIC HEART DISEASE): ICD-10-CM

## 2022-05-13 DIAGNOSIS — I48.0 PAF (PAROXYSMAL ATRIAL FIBRILLATION) (HCC): ICD-10-CM

## 2022-05-13 DIAGNOSIS — Z79.01 CHRONIC ANTICOAGULATION: ICD-10-CM

## 2022-05-13 DIAGNOSIS — I10 PRIMARY HYPERTENSION: ICD-10-CM

## 2022-05-13 DIAGNOSIS — I50.43 ACUTE ON CHRONIC COMBINED SYSTOLIC AND DIASTOLIC CONGESTIVE HEART FAILURE (HCC): ICD-10-CM

## 2022-05-13 DIAGNOSIS — Z79.899 ON AMIODARONE THERAPY: ICD-10-CM

## 2022-05-13 RX ORDER — FUROSEMIDE 20 MG/1
10 TABLET ORAL 2 TIMES DAILY
Qty: 90 TABLET | Refills: 3 | Status: SHIPPED | OUTPATIENT
Start: 2022-05-13

## 2022-06-29 ENCOUNTER — HOSPITAL ENCOUNTER (OUTPATIENT)
Age: 87
Discharge: HOME | End: 2022-06-29
Payer: MEDICARE

## 2022-06-29 ENCOUNTER — OFFICE VISIT (OUTPATIENT)
Dept: CARDIOLOGY | Age: 87
End: 2022-06-29
Payer: MEDICARE

## 2022-06-29 VITALS
HEIGHT: 70 IN | SYSTOLIC BLOOD PRESSURE: 122 MMHG | WEIGHT: 179 LBS | HEART RATE: 51 BPM | BODY MASS INDEX: 25.62 KG/M2 | RESPIRATION RATE: 16 BRPM | OXYGEN SATURATION: 97 % | DIASTOLIC BLOOD PRESSURE: 69 MMHG

## 2022-06-29 DIAGNOSIS — I50.42 CHRONIC COMBINED SYSTOLIC (CONGESTIVE) AND DIASTOLIC (CONGESTIVE) HEART FAILURE (HCC): ICD-10-CM

## 2022-06-29 DIAGNOSIS — I10 ESSENTIAL HYPERTENSION: ICD-10-CM

## 2022-06-29 DIAGNOSIS — Z86.79 HISTORY OF ORTHOSTATIC HYPOTENSION: ICD-10-CM

## 2022-06-29 DIAGNOSIS — I48.0 PAF (PAROXYSMAL ATRIAL FIBRILLATION) (HCC): ICD-10-CM

## 2022-06-29 DIAGNOSIS — I25.10 ASHD (ARTERIOSCLEROTIC HEART DISEASE): ICD-10-CM

## 2022-06-29 DIAGNOSIS — R94.39 ABNORMAL STRESS TEST: ICD-10-CM

## 2022-06-29 DIAGNOSIS — R94.39 ABNORMAL STRESS TEST: Primary | ICD-10-CM

## 2022-06-29 LAB
ANION GAP SERPL CALCULATED.3IONS-SCNC: 10 MMOL/L (ref 9–17)
BUN BLDV-MCNC: 30 MG/DL (ref 8–23)
BUN/CREAT BLD: 25 (ref 9–20)
CALCIUM SERPL-MCNC: 9.3 MG/DL (ref 8.6–10.4)
CHLORIDE BLD-SCNC: 99 MMOL/L (ref 98–107)
CO2: 28 MMOL/L (ref 20–31)
CREAT SERPL-MCNC: 1.2 MG/DL (ref 0.7–1.2)
GFR AFRICAN AMERICAN: >60 ML/MIN
GFR NON-AFRICAN AMERICAN: 57 ML/MIN
GFR SERPL CREATININE-BSD FRML MDRD: ABNORMAL ML/MIN/{1.73_M2}
GFR SERPL CREATININE-BSD FRML MDRD: ABNORMAL ML/MIN/{1.73_M2}
GLUCOSE BLD-MCNC: 145 MG/DL (ref 70–99)
HCT VFR BLD CALC: 40.6 % (ref 40.7–50.3)
HEMOGLOBIN: 12.6 G/DL (ref 13–17)
MCH RBC QN AUTO: 30.4 PG (ref 25.2–33.5)
MCHC RBC AUTO-ENTMCNC: 31 G/DL (ref 28.4–34.8)
MCV RBC AUTO: 98.1 FL (ref 82.6–102.9)
NRBC AUTOMATED: 0 PER 100 WBC
PDW BLD-RTO: 14.1 % (ref 11.8–14.4)
PLATELET # BLD: ABNORMAL K/UL (ref 138–453)
PLATELET, FLUORESCENCE: 107 K/UL (ref 138–453)
PLATELET, IMMATURE FRACTION: 3.5 % (ref 1.1–10.3)
POTASSIUM SERPL-SCNC: 4.9 MMOL/L (ref 3.7–5.3)
RBC # BLD: 4.14 M/UL (ref 4.21–5.77)
SODIUM BLD-SCNC: 137 MMOL/L (ref 135–144)
WBC # BLD: 6.3 K/UL (ref 3.5–11.3)

## 2022-06-29 PROCEDURE — 99214 OFFICE O/P EST MOD 30 MIN: CPT | Performed by: FAMILY MEDICINE

## 2022-06-29 PROCEDURE — 80048 BASIC METABOLIC PNL TOTAL CA: CPT

## 2022-06-29 PROCEDURE — 36415 COLL VENOUS BLD VENIPUNCTURE: CPT

## 2022-06-29 PROCEDURE — 1036F TOBACCO NON-USER: CPT | Performed by: FAMILY MEDICINE

## 2022-06-29 PROCEDURE — G8417 CALC BMI ABV UP PARAM F/U: HCPCS | Performed by: FAMILY MEDICINE

## 2022-06-29 PROCEDURE — 85027 COMPLETE CBC AUTOMATED: CPT

## 2022-06-29 PROCEDURE — 85055 RETICULATED PLATELET ASSAY: CPT

## 2022-06-29 PROCEDURE — G8427 DOCREV CUR MEDS BY ELIG CLIN: HCPCS | Performed by: FAMILY MEDICINE

## 2022-06-29 PROCEDURE — 1123F ACP DISCUSS/DSCN MKR DOCD: CPT | Performed by: FAMILY MEDICINE

## 2022-06-29 RX ORDER — AMOXICILLIN 500 MG/1
500 CAPSULE ORAL 3 TIMES DAILY
Status: ON HOLD | COMMUNITY
Start: 2022-06-20 | End: 2022-07-07 | Stop reason: ALTCHOICE

## 2022-06-29 RX ORDER — METOPROLOL SUCCINATE 25 MG/1
25 TABLET, EXTENDED RELEASE ORAL DAILY
Qty: 90 TABLET | Refills: 3 | Status: SHIPPED | OUTPATIENT
Start: 2022-06-29

## 2022-06-29 NOTE — PROGRESS NOTES
Alon Arnett RN am scribing for and in the presence of Ever Platt. Pam HART, MS, F.A.C.C. Patient: Anuel Carson  : 1933  Primary Care Physician: Betty Iglesias  Today's Date: 2022    REASON FOR CONSULTATION: 3 Month Follow-Up (Hx; PAF, HTN, CAD. Pt reports that his legs are still weak and have to rest because of this. Denies CP, SOB, Palpitations, Dizziness, lightheadedness. Pt has fallen a couple of times related to carrying stuff down the step and lost his balance. down four pounds since last visit and increased entresto 24/26 mg. )    Dear Dr Sapna Silva,     HPI:  Mr. Karl Zamarripa is a 80 y.o. male who was admitted to the hospital for new onset atrial fibrillation. He has never had this in the past. History of 3 vessel bypass in 2010. No heart attacks. No history of weakened heart in the past. He reports following in the past with Dr. Kamille Johnson in [facility] but has not seen him in over 3 years. He reports it has been awhile since he's seen them. He had echo done on 2021 which shows EF 50-55%  Mild aortic stenosis with peak and mean gradient of 17 and 10 mmHg although it looks worse on 2D imaging. Mitral annular calcification is seen with a mean gradient of 4.2 mmHg consistent with mild calcific mitral stenosis. Mild to moderate mitral regurgitation was seen. Moderate to severe tricuspid regurgitation. Moderate pulmonary hypertension with an estimated right ventricular systolic pressure of 57 mmHg. Diastolic function cannot be properly assessed due to atrial fibrillation. He also had abnormal stress on 2021 which was Equivocal study and EF 37%. He had successful cardioversion on 2021. Echocardiogram done on 2022: EF of 55% LV wall thickness is mildly increased. Severe bi-atrial enlargement. Mild to moderate mitral regurgitation. Moderate to severe tricuspid regurgitation.  Moderate pulmonary hypertension with an estimated right ventricular systolic pressure of 61 mmHg. Moderate pulmonic regurgitation. Moderate diastolic dysfunction is seen.      Mr. Sabino Carver reports doing fairly well since last visit but does say his biggest complaint is leg weakness and says at times his legs feel like sponges and give out on him when he is walking. He says his heart rate is normally between 51-55 but never above 60 even with exercise. He denied any current or recent chest pain, pressure or tightness. He denies having any shortness of breath or palpitations. No abdominal pain, bleeding problems, bowel issues, problems with his medications or any other concerns at this time. No cough, fever or chills. No nausea or vomiting. No leg edema. Exercise Tolerance: Mr. Sabino Carver reports that he has a fairly good exercise tolerance. His says that he could walk about one or two blocks without having to stop due to shortness of breath or chest discomfort but weakness in his legs would stop him from going further. He says he did fall a couple of times due to losing balance because of missing a step when carrying something in his hand but denies any lightheadedness or dizziness. Down four pounds since last visit with no signs of significant fluid overload.    Wt Readings from Last 3 Encounters:   06/29/22 179 lb (81.2 kg)   03/28/22 183 lb (83 kg)   02/23/22 182 lb 9.6 oz (82.8 kg)       Past Medical History:   Diagnosis Date    ACE inhibitor-aggravated angioedema 2012    Atrial fibrillation ([facility] [address] 12/02/2021    CAD (coronary artery disease)     Enlarged prostate     normal cystoscopy 7/2008 Dr. Mireya Wilcox hypertension     History of Holter monitoring 2015    symptomatic PACs    Hx of echocardiogram 2015    normal    Hyperlipidemia     Osteoarthritis     Type 2 diabetes mellitus with diabetic polyneuropathy, without long-term current use of insulin (HCC)     Type II or unspecified type diabetes mellitus without mention of complication, not stated as uncontrolled        CURRENT ALLERGIES: Altace [ramipril], Diclofenac sodium, Erythromycin, Naprosyn [naproxen], and Vesicare [solifenacin] REVIEW OF SYSTEMS: 14 systems were reviewed. Pertinent positives and negatives as above, all else negative.      Past Surgical History:   Procedure Laterality Date    BELPHAROPTOSIS REPAIR Bilateral 1/9/2015    CARDIOVASCULAR STRESS TEST  4/2010    Abnormal    COLONOSCOPY  11/2009    Normal    CORONARY ARTERY BYPASS GRAFT  2010    CYSTOURETHROSCOPY  7/10/08    CYSTOURETHROSCOPY  10/92    EYE SURGERY  09/2013    bilateral cataracts    EYE SURGERY Bilateral 01/09/2015    FRACTURE SURGERY  1989    Right arm/leg, pelvis, Left ankle, dislocated hip     PROSTATE SURGERY  10/92    biopsy    Social History:  Social History     Tobacco Use    Smoking status: Never Smoker    Smokeless tobacco: Never Used   Vaping Use    Vaping Use: Never used   Substance Use Topics    Alcohol use: Never    Drug use: No        CURRENT MEDICATIONS:  Outpatient Medications Marked as Taking for the 6/29/22 encounter (Office Visit) with Bobbi Mendez MD   Medication Sig Dispense Refill    amoxicillin (AMOXIL) 500 MG capsule Take 500 mg by mouth 3 times daily       furosemide (LASIX) 20 MG tablet Take 0.5 tablets by mouth 2 times daily 90 tablet 3    sacubitril-valsartan (ENTRESTO) 24-26 MG per tablet Take 1 tablet by mouth 2 times daily 180 tablet 3    metoprolol succinate (TOPROL XL) 100 MG extended release tablet Take 0.5 tablets by mouth daily 90 tablet 3    apixaban (ELIQUIS) 5 MG TABS tablet Take 1 tablet by mouth 2 times daily 180 tablet 3    amiodarone (CORDARONE) 200 MG tablet Take 1 tablet by mouth daily 90 tablet 3    rosuvastatin (CRESTOR) 20 MG tablet Take 1 tablet by mouth daily 90 tablet 3    celecoxib (CELEBREX) 200 MG capsule TAKE 1 CAPSULE DAILY 90 capsule 3    blood glucose test strips (ACCU-CHEK LALO PLUS) strip 1 each by Does not apply route daily 50 strip 3    gabapentin (NEURONTIN) 600 MG tablet Take 1 tablet by mouth 2 times daily for 90 days. 180 tablet 3    glipiZIDE-metFORMIN (METAGLIP) 2.5-500 MG per tablet Take 1 tablet by mouth 3 times daily 270 tablet 3    Insulin Pen Needle ([employer] PEN NEEDLES) 32G X 4 MM MISC 1 each by Does not apply route daily 100 each 2    oxybutynin (DITROPAN) 5 MG tablet 1 tab twice daily (Patient taking differently: Take 5 mg by mouth 2 times daily 1 tab twice daily ) 180 tablet 3    Insulin Glargine, 2 Unit Dial, (TOUJEO MAX SOLOSTAR) 300 UNIT/ML SOPN Inject 18 Units into the skin daily 10 pen 3    Handicap Placard MISC by Does not apply route Duration; 5 years 1 each 0    Multiple Vitamins-Minerals (VISION FORMULA PO) Take 1 tablet by mouth daily      aspirin 81 MG EC tablet Take 81 mg by mouth daily. FAMILY HISTORY: family history includes Cancer in his father; Heart Disease in his father. PHYSICAL EXAM:   /69 (Site: Left Upper Arm, Position: Sitting, Cuff Size: Medium Adult)   Pulse 51   Resp 16   Ht 5' 10\" (1.778 m)   Wt 179 lb (81.2 kg)   SpO2 97%   BMI 25.68 kg/m²  Body mass index is 25.68 kg/m². Constitutional: He is oriented to person, place, and time. He appears well-developed and well-nourished. In no acute distress. HEENT: Normocephalic and atraumatic. No JVD present. Carotid bruit is not present. No mass and no thyromegaly present. No lymphadenopathy present. Cardiovascular: Normal rate, regular rhythm, normal heart sounds. Exam reveals no gallop and no friction rubs. 3/6 systolic murmur, 5th intercostal space on the LEFT in the mid-clavicular line (cardiac apex). Pulmonary/Chest: Effort normal and breath sounds normal. No respiratory distress. He has no wheezes, rhonchi or rales. Abdominal: Soft, non-tender. Bowel sounds and aorta are normal. He exhibits no organomegaly, mass or bruit. Extremities: Trace-1+ 1/2 up to the knees bilaterally. No cyanosis or clubbing. 2+ radial and carotid pulses.  Distal extremity pulses: 1+ bilaterally. Neurological: He is alert and oriented to person, place, and time. No evidence of gross cranial nerve deficit. Coordination appeared normal.   Skin: Skin is warm and dry. There is no rash or diaphoresis. Psychiatric: He has a normal mood and affect. His speech is normal and behavior is normal.      MOST RECENT LABS ON RECORD:   Lab Results   Component Value Date    WBC 7.5 01/19/2022    HGB 15.0 01/19/2022    HCT 47.3 01/19/2022     01/19/2022    CHOL 114 01/28/2019    TRIG 96 01/28/2019    HDL 37 (L) 07/09/2021    ALT 41 12/03/2021    AST 19 12/03/2021     01/19/2022    K 4.8 01/19/2022     01/19/2022    CREATININE 1.04 01/19/2022    BUN 23 01/19/2022    CO2 27 01/19/2022    TSH 2.62 12/02/2021    PSA 0.91 12/24/2012    GLUF 96 07/09/2021    LABA1C 7.0 (H) 01/11/2022    LABMICR 20 (H) 07/09/2021     ASSESSMENT:     Diagnosis Orders   1. Abnormal stress test     2. ASHD (arteriosclerotic heart disease)     3. PAF (paroxysmal atrial fibrillation) ([address]     4. Chronic combined systolic (congestive) and diastolic (congestive) heart failure ([address]     5. Essential hypertension     6. History of orthostatic hypotension       PLAN:   Abnormal Stress Test:    For these reasons, I recommended that the patient consider undergoing a cardiac catheterization with coronary angiography to assess their coronary anatomy and facilitate better treatment recommendations. I discussed the risks, benefits, and alternatives to the procedure including the risk of bleeding, contrast induced allergy and/or kidney damage, arrythmia, stroke, heart attack, death, or the need for further procedures.  I also discussed the fact that although treatment with simple medical management is a potential treatment option in place of cardiac catheterization, I expressed my opinion that cardiac catheterization in order to define their coronary anatomy and rule out severe 3 vessel or left main coronary artery disease would significant help guide the most appropriate treatment strategy ranging from no treatment, to medications, stents, to even coronary bypass surgery. The patient verbalized understanding of the risks benefits and alternatives and stated that they would like to undergo the procedure. We will plan to schedule the procedure here at [facility] on 7/7/2022.  Risk factors: dyslipidemia, male gender, hypertension   CBC to assess hemoglobin and hematocrit   BMP to assess kidney function and potassium.  Medical Management for suspected Coronary artery disease and myocardial ischemia: Likely stable, as above-Hx: CABG x 3 in April 2010   Antiplatelet Agent: Continue Aspirin 81 mg daily.  Beta Blocker: DECREASE to Metoprolol succinate (Toprol XL) 25 mg daily. to help increase his heart rate.  Anti-anginal medications: Not indicated at this time.  Cholesterol Reduction Therapy: Continue rosuvastatin (Crestor) 20 mg daily.  Additional Testing List: None    · Hyperlipidemia: Mixed LDL: 66 on 7/9/2021  · Cholesterol Reduction Therapy: Continue rosuvastatin (Crestor) 20 mg daily.  Bradycardia: Probably symptomatic with likely at least some degree of chronotropic incompetence. Patient says he never sees his HR>60 bpm  · Beta Blocker: DECREASE to Metoprolol succinate (Toprol XL) 25 mg daily. · I explained to him that He more than likely has conduction disease, and at some point a pacemaker implant would be very reasonable especially if cutting back on beta blocker does not help with his heart rate. I did recommend doing the heart catheterization, and then if no blockages were seen, I would more than likely put him on a treadmill to see how fast his heart rate goes up to confirm if he does in fact need a pacemaker.      · Leg Weakness/Diminished Pedal Pulses  · I did say we could order an ultrasound of bilateral lower extremities, however we can hold off on this for now until his heart catheterization is done     · Paroxysmal Atrial Fibrillation: Rate Control Symptomatic.  Beta Blocker: DECREASE to Metoprolol succinate (Toprol XL) 25 mg daily. to help increase his heart rate.  Anti-Arrhythmic: Continue amiodarone (Pacerone) 200 mg daily.  Monitoring: Since they will being maintained on Amiodarone, I told them that we will need to closely monitor them for potential side effects. These include monitoring LFTs and TSH at least every 6 months as well as chest x-rays, pulmonary function tests, and eye exams at least yearly. [id number] Score for Atrial Fibrillation Stroke Risk    Risk   Factors  Component Value   C CHF Yes 1   H HTN Yes 1   A2 Age >= 76 Yes,  (80 y.o.) 2   D DM Yes 1   S2 Prior Stroke/TIA No 0   V Vascular Disease No 0   A Age 74-69 No,  (80 y.o.) 0   Sc Sex male 0    [id number]  Score  5   Score last updated 65/8/59 2:76 PM EST  Click here for a link to the UpToDate guideline \"Atrial Fibrillation: Anticoagulation therapy to prevent embolization  Disclaimer: Risk Score calculation is dependent on accuracy of patient problem list and past encounter diagnosis.  Stroke Risk: CHADS2-VASc Score: 5/9 (6.7% stroke risk)   Anticoagulation: Continue Apixaban (Eliquis) 5 mg every 12 hours. Because of his atrial fibrillation, I also would also recommend that he continue with anticoagulation to decrease his risk of stoke but also reminded him to watch for signs of blood in his stool or black tarry stools and stop the medication immediately if this develops as this could be life threatening. Chronic combined heart failure: New [place] Heart Association Class: III but probably his baseline: down four pounds since last visit with no signs of fluid overload. · Beta Blocker: DECREASE to Metoprolol succinate (Toprol XL) 25 mg daily. ACE Inibitor/ARB: Continue sacubitril/valsartan (Entresto) 24/26 mg twice daily.   · Diuretics: Continue furosemide (Lasix) 10 mg twice daily.  · Heart failure counseling: I advised them to try and keep their legs up whenever possible and to limit salt in their diet. · Essential Hypertension: Controlled   Beta Blocker: DECREASE to Metoprolol succinate (Toprol XL) 25 mg daily.  ACE Inibitor/ARB: Continue sacubitril/valsartan (Entresto) 24/26 mg 1 tablet twice daily.  Calcium Channel Blocker: Not indicated at this time.  Diuretics: Continue furosemide (Lasix) 10 mg twice daily. \    · History of Orthostatic hypotension:  · Pharmacologic Therapy: Not indicated at this time. · Nonpharmacologic counseling: Because of his condition, I reminded him to try and keep himself well-hydrated and to take extra time when moving from laying to sitting, sitting to standing and standing to walking. I also explained to him to help improve his symptoms he should include 3 g sodium diet, 1 or 2 L of sports drinks daily, knee-high compressions stockings. Finally, I recommended that he continue his other medications and follow up with you as previously scheduled. FOLLOW UP:   I told Mr. Martha Sharp to call my office if he had any problems, but otherwise told him to Return in about 6 weeks (around 8/10/2022). However, I would be happy to see him sooner should the need arise. Sincerely,  Latrell Orozco MD, MS, F.A.C.C. [facility] Cardiology Specialist    [address]  Phone: [phone], Fax: [phone]      I believe that the risk of significant morbidity and mortality related to the patient's current medical conditions are: intermediate-high. The documentation recorded by the scribe, accurately and completely reflects the services I personally performed and the decisions made by me. Milla Miller MD, MS, F.A.C.C.  June 29, 2022

## 2022-06-29 NOTE — PATIENT INSTRUCTIONS
SURVEY:    You may be receiving a survey from [employer] regarding your visit today. Please complete the survey to enable us to provide the highest quality of care to you and your family. If you cannot score us a very good on any question, please call the office to discuss how we could have made your experience a very good one. Thank you.

## 2022-06-30 ENCOUNTER — TELEPHONE (OUTPATIENT)
Dept: CARDIOLOGY | Age: 87
End: 2022-06-30

## 2022-06-30 NOTE — TELEPHONE ENCOUNTER
----- Message from Kinsey Maloney MD sent at 6/30/2022 12:00 AM EDT -----  Let MrLevy Rajlamine Merrill know their test result was ok. Will discuss at next visit. Thanks.

## 2022-07-07 ENCOUNTER — HOSPITAL ENCOUNTER (OUTPATIENT)
Dept: CARDIAC CATH/INVASIVE PROCEDURES | Age: 87
Discharge: HOME | End: 2022-07-07
Attending: FAMILY MEDICINE | Admitting: FAMILY MEDICINE
Payer: MEDICARE

## 2022-07-07 VITALS
WEIGHT: 178 LBS | HEART RATE: 49 BPM | OXYGEN SATURATION: 97 % | RESPIRATION RATE: 20 BRPM | SYSTOLIC BLOOD PRESSURE: 137 MMHG | TEMPERATURE: 96.9 F | HEIGHT: 70 IN | BODY MASS INDEX: 25.48 KG/M2 | DIASTOLIC BLOOD PRESSURE: 52 MMHG

## 2022-07-07 DIAGNOSIS — R06.02 SHORTNESS OF BREATH ON EXERTION: ICD-10-CM

## 2022-07-07 DIAGNOSIS — G90.8 CHRONOTROPIC INCOMPETENCE WITH AUTONOMIC DYSFUNCTION: Primary | ICD-10-CM

## 2022-07-07 PROBLEM — R94.39 ABNORMAL CARDIOVASCULAR STRESS TEST: Status: ACTIVE | Noted: 2022-07-07

## 2022-07-07 PROBLEM — I20.9 ANGINA, CLASS III (HCC): Status: ACTIVE | Noted: 2022-07-07

## 2022-07-07 PROBLEM — R00.1 SYMPTOMATIC SINUS BRADYCARDIA: Status: ACTIVE | Noted: 2022-07-07

## 2022-07-07 LAB — GLUCOSE BLD-MCNC: 80 MG/DL (ref 74–100)

## 2022-07-07 PROCEDURE — 93459 L HRT ART/GRFT ANGIO: CPT | Performed by: FAMILY MEDICINE

## 2022-07-07 PROCEDURE — 82947 ASSAY GLUCOSE BLOOD QUANT: CPT

## 2022-07-07 PROCEDURE — 2500000003 HC RX 250 WO HCPCS

## 2022-07-07 PROCEDURE — 2580000003 HC RX 258: Performed by: FAMILY MEDICINE

## 2022-07-07 PROCEDURE — 6360000002 HC RX W HCPCS

## 2022-07-07 PROCEDURE — 6360000004 HC RX CONTRAST MEDICATION: Performed by: FAMILY MEDICINE

## 2022-07-07 PROCEDURE — 93459 L HRT ART/GRFT ANGIO: CPT

## 2022-07-07 PROCEDURE — C1894 INTRO/SHEATH, NON-LASER: HCPCS

## 2022-07-07 PROCEDURE — C1769 GUIDE WIRE: HCPCS

## 2022-07-07 PROCEDURE — 2709999900 HC NON-CHARGEABLE SUPPLY

## 2022-07-07 RX ORDER — SODIUM CHLORIDE 0.9 % (FLUSH) 0.9 %
5-40 SYRINGE (ML) INJECTION PRN
Status: DISCONTINUED | OUTPATIENT
Start: 2022-07-07 | End: 2022-07-07 | Stop reason: HOSPADM

## 2022-07-07 RX ORDER — SODIUM CHLORIDE 9 MG/ML
INJECTION, SOLUTION INTRAVENOUS CONTINUOUS
Status: DISCONTINUED | OUTPATIENT
Start: 2022-07-07 | End: 2022-07-07 | Stop reason: HOSPADM

## 2022-07-07 RX ORDER — DIPHENHYDRAMINE HCL 25 MG
50 CAPSULE ORAL ONCE
Status: DISCONTINUED | OUTPATIENT
Start: 2022-07-07 | End: 2022-07-07 | Stop reason: HOSPADM

## 2022-07-07 RX ORDER — SODIUM CHLORIDE 9 MG/ML
INJECTION, SOLUTION INTRAVENOUS PRN
Status: DISCONTINUED | OUTPATIENT
Start: 2022-07-07 | End: 2022-07-07 | Stop reason: HOSPADM

## 2022-07-07 RX ORDER — ACETAMINOPHEN 325 MG/1
650 TABLET ORAL EVERY 4 HOURS PRN
Status: DISCONTINUED | OUTPATIENT
Start: 2022-07-07 | End: 2022-07-07 | Stop reason: HOSPADM

## 2022-07-07 RX ORDER — SODIUM CHLORIDE 0.9 % (FLUSH) 0.9 %
5-40 SYRINGE (ML) INJECTION EVERY 12 HOURS SCHEDULED
Status: DISCONTINUED | OUTPATIENT
Start: 2022-07-07 | End: 2022-07-07 | Stop reason: HOSPADM

## 2022-07-07 RX ORDER — NITROGLYCERIN 0.4 MG/1
0.4 TABLET SUBLINGUAL EVERY 5 MIN PRN
Status: DISCONTINUED | OUTPATIENT
Start: 2022-07-07 | End: 2022-07-07 | Stop reason: HOSPADM

## 2022-07-07 RX ADMIN — IOPAMIDOL 120 ML: 755 INJECTION, SOLUTION INTRAVENOUS at 11:00

## 2022-07-07 RX ADMIN — SODIUM CHLORIDE: 9 INJECTION, SOLUTION INTRAVENOUS at 09:34

## 2022-07-07 NOTE — OP NOTE
-  Coronary Angiography Brief Post Operative Note:    Severe three vessel coronary artery disease involving a 100% LAD, 50% left main, 80% OM1, and proximal 100% stenosis in the right coronary artery. Normal left ventricular end diastolic pressure. 3 of 3 bypass grafts patent including a LIMA-LAD, an SVG-D1 and an SVG-RCA but an unrevascularized OM1 which is about 2 mm. Proceed with a trial of guideline directed maximal medical management. However, if the patients symptoms persist, reconsidering angioplasty and/or stenting at that time may be reasonable.      Electronically signed by Moon Ott MD on 7/7/2022 at 11:45 AM

## 2022-07-07 NOTE — PROGRESS NOTES
Patient returned to pre/post area. Report received from Suraj Morley, [facility]. Pressure dressing to left wrist clean, dry, and intact.

## 2022-07-08 ENCOUNTER — HOSPITAL ENCOUNTER (OUTPATIENT)
Dept: NON INVASIVE DIAGNOSTICS | Age: 87
Discharge: HOME | End: 2022-07-08
Attending: FAMILY MEDICINE
Payer: MEDICARE

## 2022-07-08 DIAGNOSIS — R06.02 SHORTNESS OF BREATH ON EXERTION: ICD-10-CM

## 2022-07-08 DIAGNOSIS — G90.8 CHRONOTROPIC INCOMPETENCE WITH AUTONOMIC DYSFUNCTION: ICD-10-CM

## 2022-07-08 PROCEDURE — 93017 CV STRESS TEST TRACING ONLY: CPT

## 2022-07-11 NOTE — PROCEDURES
[address], [place] [address]                              CARDIAC STRESS TEST    PATIENT NAME: German Lucero                 :        1933  MED REC NO:   [id number]                              ROOM:  ACCOUNT NO:   [de-identified]                           ADMIT DATE: 2022  PROVIDER:     Sapphire Moise MD    CARDIOVASCULAR DIAGNOSTIC DEPARTMENT    DATE OF STUDY:  2022    ORDERING PROVIDER:  Sapphire Moise MD    PRIMARY CARE PROVIDER:  Sina Gomez. Leatha Napier MD    INTERPRETING PHYSICIAN:  Sapphire Moise MD    EXERCISE STRESS TEST REPORT    Stress, exercise stress. INDICATIONS:  Assessment of a cardiac cause:  Dyspnea. CLINICAL HISTORY:  The patient is an 54-year-old man with known coronary  artery disease. Previous cardiac history includes:  Stress test, cardiac  catheterization, coronary artery bypass graft. Other previous history includes:  Fatigue, dyspnea, diabetes mellitus,  caffeine, hypertension, family history of coronary artery disease <60 in  father. Symptoms just prior to testing include:  None. Relevant medications:  Metoprolol (Toprol). Amiodarone. PROCEDURE:  The patient performed treadmill exercise using a Emmanuel  protocol, completing 4:16 minutes and completing an estimated workload  of 3.07 metabolic equivalents (METS). The test was terminated due to fatigue and shortness of breath. The heart rate was 58 beats per minute at baseline and increased to 101  beats at peak exercise, which was 76% of the maximum predicted heart  rate. The rest blood pressure was 130/74 mm/Hg and increased to 178/72  mm/Hg, which is a normal response. During the procedure, the patient  developed fatigue, shortness of breath and leg fatigue, but denied chest  discomfort.     STRESS ECG RESULTS:  The resting electrocardiogram demonstrated sinus  bradycardia without definitive ST-segment abnormalities suggestive of  myocardial ischemia. At peak exercise and during recovery, the patient developed:    Horizontal ST segment changes in leads II, III, AVF, V5, V6, which did  not meet diagnostic criteria for myocardial ischemia with no premature  atrial contractions (PACs) and no premature ventricular contractions  (PVCs). IMPRESSION:  No significant electrocardiographic evidence of myocardial ischemia  during EKG monitoring without significant associated arrhythmias. The patient reached 76% of the peak predicted heart rate with a maximum  workload of 6.1 METS. Steve Be MD    D: 07/10/2022 10:30:21       T: 07/11/2022 10:37:50     [email or web address]  Job#: [id number]     Doc#: Unknown    CC:  Matthew Puderik.  MD Antonia Simpson MD

## 2022-07-12 ENCOUNTER — HOSPITAL ENCOUNTER (OUTPATIENT)
Age: 87
Discharge: HOME | End: 2022-07-12
Payer: MEDICARE

## 2022-07-12 ENCOUNTER — TELEPHONE (OUTPATIENT)
Dept: CARDIOLOGY | Age: 87
End: 2022-07-12

## 2022-07-12 DIAGNOSIS — E78.2 MIXED HYPERLIPIDEMIA: ICD-10-CM

## 2022-07-12 DIAGNOSIS — E11.42 TYPE 2 DIABETES MELLITUS WITH DIABETIC POLYNEUROPATHY, WITHOUT LONG-TERM CURRENT USE OF INSULIN (HCC): ICD-10-CM

## 2022-07-12 LAB
ANION GAP SERPL CALCULATED.3IONS-SCNC: 8 MMOL/L (ref 9–17)
BUN BLDV-MCNC: 22 MG/DL (ref 8–23)
BUN/CREAT BLD: 22 (ref 9–20)
CALCIUM SERPL-MCNC: 9.8 MG/DL (ref 8.6–10.4)
CHLORIDE BLD-SCNC: 99 MMOL/L (ref 98–107)
CHOLESTEROL, FASTING: 112 MG/DL
CHOLESTEROL/HDL RATIO: 4
CO2: 30 MMOL/L (ref 20–31)
CREAT SERPL-MCNC: 0.99 MG/DL (ref 0.7–1.2)
CREATININE URINE: 82.5 MG/DL (ref 39–259)
GFR AFRICAN AMERICAN: >60 ML/MIN
GFR NON-AFRICAN AMERICAN: >60 ML/MIN
GFR SERPL CREATININE-BSD FRML MDRD: ABNORMAL ML/MIN/{1.73_M2}
GFR SERPL CREATININE-BSD FRML MDRD: ABNORMAL ML/MIN/{1.73_M2}
GLUCOSE FASTING: 75 MG/DL (ref 70–99)
HDLC SERPL-MCNC: 28 MG/DL
LDL CHOLESTEROL: 58 MG/DL (ref 0–130)
MICROALBUMIN/CREAT 24H UR: 16 MG/L
MICROALBUMIN/CREAT UR-RTO: 19 MCG/MG CREAT
POTASSIUM SERPL-SCNC: 5.1 MMOL/L (ref 3.7–5.3)
SODIUM BLD-SCNC: 137 MMOL/L (ref 135–144)
TRIGLYCERIDE, FASTING: 130 MG/DL

## 2022-07-12 PROCEDURE — 82570 ASSAY OF URINE CREATININE: CPT

## 2022-07-12 PROCEDURE — 82043 UR ALBUMIN QUANTITATIVE: CPT

## 2022-07-12 PROCEDURE — 83036 HEMOGLOBIN GLYCOSYLATED A1C: CPT

## 2022-07-12 PROCEDURE — 80048 BASIC METABOLIC PNL TOTAL CA: CPT

## 2022-07-12 PROCEDURE — 80061 LIPID PANEL: CPT

## 2022-07-12 PROCEDURE — 36415 COLL VENOUS BLD VENIPUNCTURE: CPT

## 2022-07-12 NOTE — TELEPHONE ENCOUNTER
----- Message from Silke Morel MD sent at 7/12/2022 12:14 AM EDT -----  Let Patient know stress test surprisingly good and I really don't think that a pacemaker would help very much right now. Will discuss at next visit. Thanks.

## 2022-07-13 LAB
ESTIMATED AVERAGE GLUCOSE: 146 MG/DL
HBA1C MFR BLD: 6.7 % (ref 4–6)

## 2022-08-17 ENCOUNTER — OFFICE VISIT (OUTPATIENT)
Dept: CARDIOLOGY | Age: 87
End: 2022-08-17
Payer: MEDICARE

## 2022-08-17 VITALS
HEIGHT: 70 IN | RESPIRATION RATE: 18 BRPM | BODY MASS INDEX: 25.45 KG/M2 | HEART RATE: 53 BPM | WEIGHT: 177.8 LBS | DIASTOLIC BLOOD PRESSURE: 60 MMHG | OXYGEN SATURATION: 93 % | SYSTOLIC BLOOD PRESSURE: 112 MMHG

## 2022-08-17 DIAGNOSIS — Z86.79 HISTORY OF ORTHOSTATIC HYPOTENSION: ICD-10-CM

## 2022-08-17 DIAGNOSIS — I50.42 CHRONIC COMBINED SYSTOLIC (CONGESTIVE) AND DIASTOLIC (CONGESTIVE) HEART FAILURE (HCC): ICD-10-CM

## 2022-08-17 DIAGNOSIS — I10 ESSENTIAL HYPERTENSION: ICD-10-CM

## 2022-08-17 DIAGNOSIS — R00.1 BRADYCARDIA: ICD-10-CM

## 2022-08-17 DIAGNOSIS — Z79.01 ENCOUNTER FOR CURRENT LONG-TERM USE OF ANTICOAGULANTS: ICD-10-CM

## 2022-08-17 DIAGNOSIS — I20.9 ANGINA, CLASS III (HCC): Primary | ICD-10-CM

## 2022-08-17 DIAGNOSIS — R29.898 WEAKNESS OF BOTH LOWER EXTREMITIES: ICD-10-CM

## 2022-08-17 DIAGNOSIS — E78.2 MIXED HYPERLIPIDEMIA: ICD-10-CM

## 2022-08-17 DIAGNOSIS — I48.0 PAF (PAROXYSMAL ATRIAL FIBRILLATION) (HCC): ICD-10-CM

## 2022-08-17 PROCEDURE — G8417 CALC BMI ABV UP PARAM F/U: HCPCS | Performed by: FAMILY MEDICINE

## 2022-08-17 PROCEDURE — G8427 DOCREV CUR MEDS BY ELIG CLIN: HCPCS | Performed by: FAMILY MEDICINE

## 2022-08-17 PROCEDURE — 1123F ACP DISCUSS/DSCN MKR DOCD: CPT | Performed by: FAMILY MEDICINE

## 2022-08-17 PROCEDURE — 99214 OFFICE O/P EST MOD 30 MIN: CPT | Performed by: FAMILY MEDICINE

## 2022-08-17 PROCEDURE — 1036F TOBACCO NON-USER: CPT | Performed by: FAMILY MEDICINE

## 2022-08-17 PROCEDURE — 99211 OFF/OP EST MAY X REQ PHY/QHP: CPT | Performed by: FAMILY MEDICINE

## 2022-08-17 NOTE — PATIENT INSTRUCTIONS
Pt arrives to ED from [facility]; reports pt was diagnosed with pneumonia on the 1/29. Pt has been \"declining\" the past 2 days- pt has been altered, lethargic, SOB, and not eating or drinking well. Pt arrives to ED, not responding to ED staff. Pt with fever of 107.3, placed on 10L non-rebreather. Pt DNRCC, ice packs placed on pt. SURVEY:    You may be receiving a survey from [employer] regarding your visit today. Please complete the survey to enable us to provide the highest quality of care to you and your family. If you cannot score us a very good on any question, please call the office to discuss how we could have made your experience a very good one. Thank you.

## 2022-08-17 NOTE — PROGRESS NOTES
Patito Boateng am scribing for and in the presence of Silke Morel MD, MS, F.A.C.C..      Patient: Angelica Bennett  : 1933  Primary Care Physician: Bette Figueroa  Today's Date: 2022    REASON FOR CONSULTATION: Follow-up (HX:CAD, PAF, CHF, HTN  PT is here for 6 week follow up he states he is doing ok. He had cath done  and stress 22 he does have occasional lightheaded/dizziness he does have weakness, if bends over long is hard to get up denies:CP, palp SOB, )    Dear Dr Karrie Marquez,     HPI:  Mr. Titus Lara is a 80 y.o. male who was admitted to the hospital for new onset atrial fibrillation. He has never had this in the past. History of 3 vessel bypass in 2010. No heart attacks. No history of weakened heart in the past. He reports following in the past with Dr. Karen Damico in [facility] (formerly [facility]) but has not seen him in over 3 years. He reports it has been awhile since he's seen them. He had echo done on 2021 which shows EF 50-55%  Mild aortic stenosis with peak and mean gradient of 17 and 10 mmHg although it looks worse on 2D imaging. Mitral annular calcification is seen with a mean gradient of 4.2 mmHg consistent with mild calcific mitral stenosis. Mild to moderate mitral regurgitation was seen. Moderate to severe tricuspid regurgitation. Moderate pulmonary hypertension with an estimated right ventricular systolic pressure of 57 mmHg. Diastolic function cannot be properly assessed due to atrial fibrillation. He also had abnormal stress on 2021 which was Equivocal study and EF 37%. He had successful cardioversion on 2021. Echocardiogram done on 2022: EF of 55% LV wall thickness is mildly increased. Severe bi-atrial enlargement. Mild to moderate mitral regurgitation. Moderate to severe tricuspid regurgitation. Moderate pulmonary hypertension with an estimated right ventricular systolic pressure of 61 mmHg. Moderate pulmonic regurgitation.  Moderate diastolic dysfunction is seen. He had heart cath done on 7/7/2022  Severe four vessel disease involving the left main, LAD, circumflex and right coronary arteries including a non re-vascularized OM1 branch of the Cx. 3 of 3 bypass grafts patent. Normal LVEDP. He also had stress test exercise only 7/8/2022 he reached 76% of the peak predicted heart rate with a maximum workload of 6.1 METS. Mr. Cynthia Rothman reports doing fairly well since last visit but does say his biggest complaint is leg weakness and says at times his legs feel like sponges and give out on him when he is walking. He was able to walk around fair a couple weeks ago without any significant problems. He denied any current or recent chest pain, pressure or tightness. He denies having any shortness of breath or palpitations. No abdominal pain, bleeding problems, bowel issues, problems with his medications or any other concerns at this time. No cough, fever or chills. No nausea or vomiting. No leg edema. Wt Readings from Last 3 Encounters:   08/17/22 177 lb 12.8 oz (80.6 kg)   07/19/22 173 lb 8 oz (78.7 kg)   07/07/22 178 lb (80.7 kg)       Past Medical History:   Diagnosis Date    ACE inhibitor-aggravated angioedema 2012    Atrial fibrillation ([facility] [address] 12/02/2021    CAD (coronary artery disease)     CHF (congestive heart failure) (HCC)     Enlarged prostate     normal cystoscopy 7/2008 Dr. Su Silva hypertension     History of blood transfusion     History of Holter monitoring 2015    symptomatic PACs    Hx of echocardiogram 2015    normal    Hyperlipidemia     Osteoarthritis     Type 2 diabetes mellitus with diabetic polyneuropathy, without long-term current use of insulin (HCC)     Type II or unspecified type diabetes mellitus without mention of complication, not stated as uncontrolled        CURRENT ALLERGIES: Altace [ramipril], Diclofenac sodium, Erythromycin, Naprosyn [naproxen], and Vesicare [solifenacin] REVIEW OF SYSTEMS: 14 systems were reviewed.  Pertinent positives and negatives as above, all else negative. Past Surgical History:   Procedure Laterality Date    BELPHAROPTOSIS REPAIR Bilateral 1/9/2015    CARDIAC CATHETERIZATION Left 07/07/2022    left radial/M[facility]/ Dr. Castaneda Maxim TEST  4/2010    Abnormal    COLONOSCOPY  11/2009    Normal    CORONARY ARTERY BYPASS GRAFT  2010    CYSTOURETHROSCOPY  7/10/08    CYSTOURETHROSCOPY  10/92    EYE SURGERY  09/2013    bilateral cataracts    EYE SURGERY Bilateral 01/09/2015    FRACTURE SURGERY  1989    Right arm/leg, pelvis, Left ankle, dislocated hip     PROSTATE SURGERY  10/92    biopsy    Social History:  Social History     Tobacco Use    Smoking status: Never    Smokeless tobacco: Never   Vaping Use    Vaping Use: Never used   Substance Use Topics    Alcohol use: Never    Drug use: No        CURRENT MEDICATIONS:  Outpatient Medications Marked as Taking for the 8/17/22 encounter (Office Visit) with Ivet Dumont MD   Medication Sig Dispense Refill    oxybutynin (DITROPAN) 5 MG tablet Take 1 tablet by mouth in the morning and 1 tablet before bedtime. 180 tablet 3    blood glucose test strips (ACCU-CHEK LALO PLUS) strip 1 each by Does not apply route daily 50 strip 3    Insulin Pen Needle ([employer] PEN NEEDLES) 32G X 4 MM MISC 1 each by Does not apply route daily 100 each 2    glipiZIDE-metFORMIN (METAGLIP) 2.5-500 MG per tablet Take 1 tablet by mouth in the morning and 1 tablet at noon and 1 tablet before bedtime.  270 tablet 3    furosemide (LASIX) 20 MG tablet Take 0.5 tablets by mouth 2 times daily 90 tablet 3    apixaban (ELIQUIS) 5 MG TABS tablet Take 1 tablet by mouth 2 times daily 180 tablet 3    amiodarone (CORDARONE) 200 MG tablet Take 1 tablet by mouth daily 90 tablet 3    rosuvastatin (CRESTOR) 20 MG tablet Take 1 tablet by mouth daily 90 tablet 3    celecoxib (CELEBREX) 200 MG capsule TAKE 1 CAPSULE DAILY 90 capsule 3    Insulin Glargine, 2 Unit Dial, (TOUJEEFRAIN ADDISON SOLOSTAR) 300 UNIT/ML SOPN Inject 18 Units into the skin daily 10 pen 3    Handicap Placard MISC by Does not apply route Duration; 5 years 1 each 0    Multiple Vitamins-Minerals (VISION FORMULA PO) Take 1 tablet by mouth daily      aspirin 81 MG EC tablet Take 81 mg by mouth daily. FAMILY HISTORY: family history includes Cancer in his father; Heart Disease in his father. PHYSICAL EXAM:   /60 (Site: Right Upper Arm, Position: Sitting, Cuff Size: Large Adult)   Pulse 53   Resp 18   Ht 5' 10\" (1.778 m)   Wt 177 lb 12.8 oz (80.6 kg)   SpO2 93%   BMI 25.51 kg/m²  Body mass index is 25.51 kg/m². Constitutional: He is oriented to person, place, and time. He appears well-developed and well-nourished. In no acute distress. HEENT: Normocephalic and atraumatic. No JVD present. Carotid bruit is not present. No mass and no thyromegaly present. No lymphadenopathy present. Cardiovascular: Normal rate, regular rhythm, normal heart sounds. Exam reveals no gallop and no friction rubs. 4/6 systolic murmur, 2nd intercostal space on the RIGHT just lateral to the sternum. Pulmonary/Chest: Effort normal and breath sounds normal. No respiratory distress. He has no wheezes, rhonchi or rales. Abdominal: Soft, non-tender. Bowel sounds and aorta are normal. He exhibits no organomegaly, mass or bruit. Extremities: Trace-1+ 1/2 up to the knees bilaterally. No cyanosis or clubbing. 2+ radial and carotid pulses. Distal extremity pulses: 1+ bilaterally. Neurological: He is alert and oriented to person, place, and time. No evidence of gross cranial nerve deficit. Coordination appeared normal.   Skin: Skin is warm and dry. There is no rash or diaphoresis. Psychiatric: He has a normal mood and affect.  His speech is normal and behavior is normal.      MOST RECENT LABS ON RECORD:   Lab Results   Component Value Date    WBC 6.3 06/29/2022    HGB 12.6 (L) 06/29/2022    HCT 40.6 (L) 06/29/2022    PLT See Reflexed IPF Result 06/29/2022    CHOL 114 01/28/2019    TRIG 96 01/28/2019    HDL 28 (L) 07/12/2022    ALT 41 12/03/2021    AST 19 12/03/2021     07/12/2022    K 5.1 07/12/2022    CL 99 07/12/2022    CREATININE 0.99 07/12/2022    BUN 22 07/12/2022    CO2 30 07/12/2022    TSH 2.62 12/02/2021    PSA 0.91 12/24/2012    GLUF 75 07/12/2022    LABA1C 6.7 (H) 07/12/2022    LABMICR 19 (H) 07/12/2022     ASSESSMENT:     Diagnosis Orders   1. Mixed hyperlipidemia        2. Bradycardia        3. Weakness of both lower extremities        4. PAF (paroxysmal atrial fibrillation) ([facility] [address]        5. Chronic combined systolic (congestive) and diastolic (congestive) heart failure ([facility] [address]        6. Essential hypertension        7. History of orthostatic hypotension            PLAN:  Hyperlipidemia: Mixed LDL: 58 on 7/12/2022  Cholesterol Reduction Therapy: Continue rosuvastatin (Crestor) 20 mg daily. Bradycardia:  Probably symptomatic  with likely at least some degree of chronotropic incompetence. Patient says he never sees his HR>60 bpm  Beta Blocker: Continue Metoprolol succinate (Toprol XL) 25 mg daily. Leg Weakness/Diminished Pedal Pulses  Referral placed to cardiac rehab. Paroxysmal Atrial Fibrillation: Rate Control Symptomatic. Beta Blocker: Continue Metoprolol succinate (Toprol XL) 25 mg daily. Anti-Arrhythmic: Continue amiodarone (Pacerone) 200 mg daily. Monitoring: Since they will being maintained on Amiodarone, I told them that we will need to closely monitor them for potential side effects. These include monitoring LFTs and TSH at least every 6 months as well as chest x-rays, pulmonary function tests, and eye exams at least yearly.   [id number] Score for Atrial Fibrillation Stroke Risk    Risk   Factors  Component Value   C CHF Yes 1   H HTN Yes 1   A2 Age >= 76 Yes,  (80 y.o.) 2   D DM Yes 1   S2 Prior Stroke/TIA No 0   V Vascular Disease No 0   A Age 74-69 No,  (80 y.o.) 0   Sc Sex male 0    [id number]  Score  5   Score last updated 24/5/61 8:67 PM EST  Click here for a link to the UpToDate guideline \"Atrial Fibrillation: Anticoagulation therapy to prevent embolization  Disclaimer: Risk Score calculation is dependent on accuracy of patient problem list and past encounter diagnosis. Stroke Risk: CHADS2-VASc Score: 5/9 (6.7% stroke risk)  Anticoagulation: Continue Apixaban (Eliquis) 5 mg every 12 hours. Because of his atrial fibrillation, I also would also recommend that he continue with anticoagulation to decrease his risk of stoke but also reminded him to watch for signs of blood in his stool or black tarry stools and stop the medication immediately if this develops as this could be life threatening. Chronic combined heart failure: [place] Heart Association Class: III but probably his baseline: down four pounds since last visit with no signs of fluid overload. Beta Blocker: Continue Metoprolol succinate (Toprol XL) 25 mg daily. ACE Inibitor/ARB: Continue sacubitril/valsartan (Entresto) 24/26 mg twice daily. Diuretics: Continue furosemide (Lasix) 10 mg twice daily. Heart failure counseling: I advised them to try and keep their legs up whenever possible and to limit salt in their diet. Essential Hypertension: Controlled  Beta Blocker: Continue Metoprolol succinate (Toprol XL) 25 mg daily. ACE Inibitor/ARB: Continue sacubitril/valsartan (Entresto) 24/26 mg 1 tablet twice daily. Calcium Channel Blocker: Not indicated at this time. Diuretics: Continue furosemide (Lasix) 10 mg twice daily. History of Orthostatic hypotension:  Pharmacologic Therapy: Not indicated at this time. Nonpharmacologic counseling: Because of his condition, I reminded him to try and keep himself well-hydrated and to take extra time when moving from laying to sitting, sitting to standing and standing to walking.  I also explained to him to help improve his symptoms he should include 3 g sodium diet, 1 or 2 L of sports drinks daily, knee-high compressions stockings. Atherosclerotic Heart Disease: Known severe coronary artery disease on 7/2022 heart catheterization with plans for guideline maximal medical management and class III angina  Antiplatelet Agent: Continue Aspirin 81 mg daily. Beta Blocker: Contraindicated due to history of symptomatic bradycardia, intolerance and/or allergy. Anti-anginal medications: Contraindicated due to history of intolerance/allergy. Cholesterol Reduction Therapy: Continue rosuvastatin (Crestor) 20 mg daily. I discussed the potential benefits of statin therapy as well as the potential risks including myalgia as well as the rare but potentially serious complication of liver or kidney damage. Although rare, I told them that this could be serious and therefore told them to stop the medication immediately and call if they developed any severe muscle aches or pains and they agreed to do so. Additional counseling: I advised them to call our office or go to the emergency room if they developed worsening or persistent chest pain or increased shortness of breath as this could be life threatening. We discussed the potential benefits of cardiac rehab to improve both his cardiac condition as well as improve his exercise tolerance and overall quality of life. He was very agreeable with this and therefore I made the referral for Phase II cardiac rehab. Finally, I recommended that he continue his other medications and follow up with you as previously scheduled. FOLLOW UP:   I told Mr. Zbigniew Coronel to call my office if he had any problems, but otherwise told him to Return in about 6 months (around 2/17/2023). However, I would be happy to see him sooner should the need arise. Sincerely,  Francisca Bowens. Pam HART, MS, F.A.C.C.   [facility] Cardiology Specialist    [address] [place] [address] [place], [place], [address] [place] [address]  Phone: [phone], Fax: [phone]      I believe that the risk of significant morbidity and mortality related to

## 2022-08-23 ENCOUNTER — HOSPITAL ENCOUNTER (OUTPATIENT)
Dept: CARDIAC REHAB | Age: 87
Setting detail: THERAPIES SERIES
Discharge: HOME | End: 2022-08-23
Payer: MEDICARE

## 2022-08-23 NOTE — PROGRESS NOTES
Phase II Cardiac Rehab Individualized Treatment Plan-Initial     Patient Name: Link Clemente  Date of Initial Assessment: 8/23/2022  ACCOUNT #: [de-identified]  Diagnosis: Stable Angina  Onset Date: 08/17/2022  Referring Physician: Dr. Noe Morris  Session Number: Assessment   Risk Stratification (of untoward event during exercise):  [] HIGH RISK  LVEF < 40%  Survivor of cardiac arrest or sudden cardiac death  Complex ventricular arrythmias (VT >6 beats, multifocal PVCs at rest or with exercise)  Presence of angina or other significant symptoms (shortness of breath, light-headedness, or dizziness at <5 METs or during recovery  MI or cardiac surgery complicated by cardiogenic shock, CHF, and/or s/s of post-procedure ischemia  Abnormal hemodynamics with exercise, especially flat or decreasing systolic BP or chronotropic incompetence with ^ workload  Significant silent ischemia (ST depression >/= 2mm without symptoms with exercise or in recovery  Signs/symptoms including angina, dizziness, light-headedness or dyspnea with low exercise levels or in recovery  Clinically significant depression   Physically Inactive - <= 5 METs  Implantable cardioverter defibrillator (ICD)  At least 4 of below:   Lipids LDL >130 / Cholesterol > 200  BP >160/100  BMI >30  Smoker who continues to smoke  DM - HgbA1C >=7  [x] MODERATE RISK  LVEF 40-49%  Mild to moderate level of silent ischemia during exercise testing or recovery (ST depression <2mm from baseline)  Presence of stable angina or other significant symptoms (unusual shortness of breath, light-headedness, or dizziness occurring only at high level of exertion [>=7 METs])  Functional capacity 5.1-8.9 METs  At least 2-3 of below:  Lipids -129 / Cholesterol 504-135  BP - systolic 377-750/IXAZHIDMU 23-00  BMI >67  DM - HgbA1C 5.8-7.0  [] LOW RISK  Rest LVEF >= 50%  No resting or exercise induced complex dysrhythmias  Uncomplicate MI, CABG, angioplasty, atherectomy, or days.  8-12 bilateral upper extremity resistance exercise at 1-3 sets per lift, on 2 non-consecutive days using TheraBand/Free Weights/Weight Machine to 8-15 reps on at lease 2 occasions. Once repetition maximum has been achieved, additional weight sets may be added. Education:   [] Equipment [place]  [] Understanding BP   [] S/S to report  [] Sodium     [] Warm up/ Cool down  [] Exercise Prescription   [] RPE Scale   [] Hot/Cold weather guidelines   [] Ex Safety   [] Home Exercise     [] Proper use weights/bands    Target Goal:   -Individual Exercise Plan  -BP<120/80 or <130/80 if DM   -Aerobic active 30 + minutes 5-7 days per week    Nutrition    Stages of Change:   [] pre-contemplation  [] Action   [] Contemplate    [] Maintainence   [x] Prep    [] Relapse    Hyperlipidemia:  [x] Yes  [] No  Lipids: 07/12/2022  Lipids:    Total Cholesterol:   Lab Results   Component Value Date    CHOL 114 01/28/2019    CHOL 135 09/22/2016    CHOL 122 05/18/2015     Lab Results   Component Value Date    TRIG 96 01/28/2019    TRIG 110 09/22/2016    TRIG 129 05/18/2015     Lab Results   Component Value Date    HDL 28 (L) 07/12/2022    HDL 37 (L) 07/09/2021    HDL 36 (L) 07/01/2020     Lab Results   Component Value Date    LDLCHOLESTEROL 58 07/12/2022    LDLCHOLESTEROL 66 07/09/2021    LDLCHOLESTEROL 62 07/01/2020     Lab Results   Component Value Date    VLDL NOT REPORTED 07/09/2021    VLDL NOT REPORTED 07/01/2020    VLDL NOT REPORTED 01/28/2019     Lab Results   Component Value Date    CHOLHDLRATIO 4.0 07/12/2022    CHOLHDLRATIO 3.2 07/09/2021    CHOLHDLRATIO 3.4 07/01/2020     Lipid Meds: Crestor    Diabetes:  [x] Yes  [] No  FBS: 145 (06/29/22)  HbA1c: 6.7 (07/12/22)  [x] Monitor BS at home   Frequency?: Everyday   Diabetes Medication: Arlene Ben Max Solostar    Weight Management:  Height: 5'10  Weight: 177.2  BMI: 25.39  Wt Goal: BMI <25  Alcohol:   Social History     Substance and Sexual Activity   Alcohol Use Never Diet Assessment Tool:   [x]  Food Diary  Rate My Plate Score: 55  Special Diet:  2 gram NA+, 30% total fat, <10% saturated fat, 25-35 grams fiber, reduced cholesterol, balanced nutrition    Intervention:   [] Dietitian Consult       [] Staff/Patient Discussion     [] Referred to Diabetes Education     Education:  [] Heart Healthy Nutrition  [] Weight Management  [] Portions  [] Fat/Cholesterol  [] Food Labels       [] Food Packaging Claims  [] Snacks and Substitutes      [] Relate Diabetes/CAD    Target Goal:  -LDL-C<100 if triglycerides are > 200  -LDL-C < 70 for high risk patients  -HbA1c < 7%  -BMI < 25     Education    Stages of Change:    [] pre-contemplation  [] Action   [] Contemplate    [] Maintainence   [x] Prep    [] Relapse    Learning Barriers:   [] Speech   [] Cognitive   [] Literacy   [] Vision   [] Hearing    [] Readiness to Learn    Knowledge test score: 53%      Family support: [x] Yes  [] No    Tobacco use:   Social History     Tobacco Use   Smoking Status Never   Smokeless Tobacco Never     Current smokers:  Longest quit attempt:  Tobacco triggers:  Barriers to successful cessation:  [] Smoking cessation medication:    Intervention:  [] Referred to physician for smoking cessation    [] Individual education and counseling  [] Tobacco adjunct      Education:   [] Cardiac A and P  [] Interventions  [] Risk Factors     [] Angina      [] Medications  [] CHF     Target Goal:  -Complete cessation of tobacco use (if applicable)  -Continued risk factor modifications  -Recognizing signs/symptoms to report  -Proper use of meds    Psychosocial  Stages of Change:    [] pre-contemplation  [] Action   [] Contemplate    [] Maintainence   [x] Prep    [] Relapse    Psychosocial Test:  Tool Used:   Bin Woody Quality of Life Score: 24/22/25/25/28  PHQ9 score: 3    Intervention:   [] Psych Consult/   [] Uses stress management skills    [] Physician Referral     Medications:     Education:    [] Stress Management   [] Depression    Target Goal:  -Assess presence or absence of depression using a valid screening tool. -Maximize coping skills.  -Positive support system. Preventative Medication:   [x] Aspirin       [x] Beta Blockade      [x] Statin or other lipid lowering agent     [] Antiplatelet   [] ACE Inhibitor/ARB   [x] Anticoagulant   Medication Compliance (stated):    [x] 100%  [] 75%           [] 50%  [] 25%      [] 0%         Target Goal:  -100% medication adherence    Fall Risk Assessment:  History of falls with or without injury  [x] Yes   [] No   Use of ambulatory aid  [] Yes  [x] No   Difficulty walking/impaired gait  [] Yes  [x] No   Numbness in feet  [x] Yes   [] No   Vision changes  [] Yes  [x] No   Dizziness  [] Yes  [x] No   Shortness of breath  [x] Yes  [] No   Medications  [x] Anticoagulant/Antiplatelet  [x] Betablocker /ACE/ARB  [] Antidepressant  [] Seizure medication   [] NA  Risk of fall  [] Low (none of above)  [] Medium (less than 3 above)  [x] High (3 or more above)    Patient 90-Day Goals: (Specific, Measurable, Achievable, Relevant, and Time-Bound)  \"Increase more strength in legs without being tired within the next 90 days. \"      Program Goals:   Achieve and progress prescribed exercise frequency, intensity, time, and type based upon initial evaluation and submaximal graded exercise results as evidenced by the attaining and maintaining the prescribed target heart rate range, a Yumiko rating of perceived exertion between 11 and 16, duration of >30 - 60 minutes using multiple exercise modes for at least 3 days/week, progressing at least 0.5-1.0 METs/week as tolerated, as evidenced by daily session reports and pre/post MET levels.      Introduce and progress 8-10 different bilateral UE and/or LE progressive resistance exercises focused on major muscle groups using 1-3 sets each per lift, on 2-3 non-consecutive days implementing free and machine weights and/or TheraBands, as appropriate, with a resistance of 40-60% 1-repetition maximum or 10 -15 repetitions to progressive overload and increasing resistance once repetitions have progressed to 15 reps and feel fairly light on 2 prior occasions. Develop regular home aerobic exercise program for 20 - 60 minutes at least 2 non-rehab days per week, excluding  5 - 10 minutes warm-up and cool-down periods, as tracked on home workout log. Establish and maintain individualized heart healthy eating plan, and gradually lose 10-15 lb of body weight over the program, utilizing dietician recommendations, moderating nutrional intake, and performing regular aerobic and strength training exercises as prescribed, as evidenced by pre- and post-program nutriton survey and routine rounding with patient, food diary, and Rate-your-Plate screening survey. Strive for blood lipid optimization with an LDL-C of <100 mg/dL or  LDL 70 mg/dL, an HDL-C of > or = 40 mg/dL for men and > or = 50 mg/dL for women, and a triglyceride level of <150 mg/dL via lifestyle education, behavioral modification, and medication compliance, as evidenced by improved post-program lipid results. Strive for HbA1C <= 7.0% via lifestyle education, behavioral modification, and medication compliance as evidenced by post program HbA1C lab results. Achieve and maintain an optimal average resting blood pressure of <120 / 80 mmHg (130/80 for DM) over the course of the program by educating patient, monitoring medication compliance, introducing and maintaining regular cardiovascular exercise program as evidenced by routine BP monitoring. Minimize self-reported psycho-social feelings of stress over the program by educating patient, monitoring medication compliance, introducing and maintaining regular cardiovascular exercise as evidenced by pre- and post- surveys and by routine rounding with patient.     Demonstrate knowledge about risk factor reduction, lifestyle modification, and heart health strategies with an increase of >=5% accuracy via pre- and post-program education assessment screening tool. Complete abstinence from tobacco products over the cardiac rehab program through intensive counseling, behavior modification, and medication compliance as evidenced by routine rounding with patient.     Electronically signed by REUBEN Umana on 8/23/22 at 10:26 AM EDT    Physician Changes/Comments:

## 2022-08-23 NOTE — PROGRESS NOTES
Cardiac Rehab Initial History and Assessment    Nighat Ordonez   9/28/1933  [id number]  8/23/2022    Primary Diagnosis: Stable Angina  Date: 08/17/2022    Living Will: [x] Yes   [] No  On File: [x] Yes   [] No   [] N/A  Durable Power of : [x] Yes   [] No    Medical History  Past Medical History:   Diagnosis Date    ACE inhibitor-aggravated angioedema 2012    Atrial fibrillation ([address] 12/02/2021    CAD (coronary artery disease)     CHF (congestive heart failure) (HCC)     Enlarged prostate     normal cystoscopy 7/2008 Dr. Ivonne Dixon hypertension     History of blood transfusion     History of Holter monitoring 2015    symptomatic PACs    Hx of echocardiogram 2015    normal    Hyperlipidemia     Osteoarthritis     Type 2 diabetes mellitus with diabetic polyneuropathy, without long-term current use of insulin (HCC)     Type II or unspecified type diabetes mellitus without mention of complication, not stated as uncontrolled        Family History  Family History   Problem Relation Age of Onset    Heart Disease Father     Diabetes Sister        Symptoms:  1. Angina   [] None   [] Tightness   [x] Shortness of Breath   [] Pressure    [] Nausea   [] Sharp, Stabbing  [] Pallor   [] Indigestion, Heartburn [] Sweaty    Where was discomfort located? Precipitating Factors? Relieved by: Rest    2. Arrhythmia   [] None   [] Irregular Beats (skips) [] Pacer    [x] Atrial Fibrillation  [] AICD    Arrhythmia Medications: Cordarone     3. Congestive Heart Failure   [] None   [x] Pedal Edema  [] Unusual weight gain   [] SOB with mild exertion [] Fatigue    4. Vascular   [x] None   [] Peripheral claudication [] R [] L  PAD History:  Claudication History:                Location:  Pain Scale (1-5):  [] Carotid Narrowing  [] R [] L    5. Musculoskeletal    [] None   [] Back Pain  Where? [x] Joint discomfort Where?  Both shoulders, Right leg pain    Nutrition  Appetite:  []  Too Good    [x]  Good  []  Poor  Diet: Regular diet, try to decrease sodium and sugar intake  Restaurant food 1-2 times/wk  Dietary Screening:  Rate Your Plate: 55  Daily Food Diary completed:       [x] Yes        [] No  Lipids:    Date: 07/12/2022        Total Cholesterol:   Lab Results   Component Value Date    CHOL 114 01/28/2019    CHOL 135 09/22/2016    CHOL 122 05/18/2015     Lab Results   Component Value Date    TRIG 96 01/28/2019    TRIG 110 09/22/2016    TRIG 129 05/18/2015     Lab Results   Component Value Date    HDL 28 (L) 07/12/2022    HDL 37 (L) 07/09/2021    HDL 36 (L) 07/01/2020     Lab Results   Component Value Date    LDLCHOLESTEROL 58 07/12/2022    LDLCHOLESTEROL 66 07/09/2021    LDLCHOLESTEROL 62 07/01/2020     Lab Results   Component Value Date    VLDL NOT REPORTED 07/09/2021    VLDL NOT REPORTED 07/01/2020    VLDL NOT REPORTED 01/28/2019     Lab Results   Component Value Date    CHOLHDLRATIO 4.0 07/12/2022    CHOLHDLRATIO 3.2 07/09/2021    CHOLHDLRATIO 3.4 07/01/2020       Lipid Meds: Crestor    Alcohol:   Social History     Substance and Sexual Activity   Alcohol Use Never     Caffeine: [x] Yes [] No  Type: Coffee  Amount: 1 cup/day  Water intake per day: approx 3-4 12oz day    Psychological  [] Depression    [] Anxiety    [x] None  Treatment:     Tobacco Use  Social History     Tobacco Use   Smoking Status Never   Smokeless Tobacco Never     Current smokers:  Longest quit attempt:  Tobacco triggers:  Barriers to successful cessation:  [] Smoking cessation medication:    Diabetes     [x] Yes  [] No  How Long: approximal 20 years  How do you manage your diabetes: Medicince  Do you check your blood sugar? [x] Yes  [] No  How often: Once a day   Have you seen a dietician or diabetic educator?        [x] Yes  [] No      HbA1c: 6.7 (07/12/2022)   FBS: 145 (06/29/2022)                 Diabetic Medication: Metaglip, Toujeo Max Solostar    Socio-Economic  Marital Status:     Medication Compliance (stated):  [x] 100%  [] 75% recovery  MI or cardiac surgery complicated by cardiogenic shock, CHF, and/or s/s of post-procedure ischemia  Abnormal hemodynamics with exercise, especially flat or decreasing systolic BP or chronotropic incompetence with ^ workload  Significant silent ischemia (ST depression >/= 2mm without symptoms with exercise or in recovery  Signs/symptoms including angina, dizziness, light-headedness or dyspnea with low exercise levels or in recovery  Clinically significant depression   Physically Inactive - <= 5 METs  Implantable cardioverter defibrillator (ICD)  At least 4 of below:   Lipids LDL >130 / Cholesterol > 200  BP >160/100  BMI >30  Smoker who continues to smoke  DM - HgbA1C >=7  [x] MODERATE RISK  LVEF 40-49%  Mild to moderate level of silent ischemia during exercise testing or recovery (ST depression <2mm from baseline)  Presence of stable angina or other significant symptoms (unusual shortness of breath, light-headedness, or dizziness occurring only at high level of exertion [>=7 METs])  Functional capacity 5.1-8.9 METs  At least 2-3 of below:  Lipids -129 / Cholesterol [phone]  BP - systolic 534-392/[id number] 08-59  BMI >26  DM - HgbA1C 5.8-7.0  [] LOW RISK  Rest LVEF >= 50%  No resting or exercise induced complex dysrhythmias  Uncomplicate MI, CABG, angioplasty, atherectomy, or stent  Normal hemodynamic and EGC response with exercise and in recovery (appropriate increases and decreases in HR and SBP with increasing workloads and recovery  Functional capacity >9 METs  Absence of angina or other significant symptoms (unusual shortness of breath, light-headedness, or dizziness during exercise and recovery)  Absence of complicated ventricular arrhythmias at rest  Absence of HF  Absence of signs/symptoms of post-event or post-procedure ischemia  Absence of clinical depression   Non-smoker   0-1 Uncontrolled risk factors    Fall Risk Assessment:  History of falls with or without injury  [x] Yes   [] No   Use of ambulatory aid  [] Yes  [x] No   Difficulty walking/impaired gait  [] Yes  [x] No   Numbness in feet  [x] Yes   [] No   Vision changes  [] Yes  [x] No   Dizziness  [] Yes  [x] No   Shortness of breath  [x] Yes  [] No   Medications  [x] Anticoagulant/Antiplatelet  [x] Betablocker /ACE/ARB  [] Antidepressant  [] Seizure medication   [] NA  Risk of fall  [] Low (none of above)  [] Medium (less than 3 above)  [x] High (3 or more above)       Patient 90-Day Goals: (Specific, Measurable, Achievable, Relevant, and Time-Bound)  \"Increase more strength in legs without being tired within the next 90 days. \"    Program Goals:   Achieve and progress prescribed exercise frequency, intensity, time, and type based upon initial evaluation and submaximal graded exercise results as evidenced by the attaining and maintaining the prescribed target heart rate range, a Yumiko rating of perceived exertion between 11 and 16, duration of >30 - 60 minutes using multiple exercise modes for at least 3 days/week, progressing at least 0.5-1.0 METs/week as tolerated, as evidenced by daily session reports and pre/post MET levels. Introduce and progress 8-10 different bilateral UE and/or LE progressive resistance exercises focused on major muscle groups using 1-3 sets each per lift, on 2-3 non-consecutive days implementing free and machine weights and/or TheraBands, as appropriate, with a resistance of 40-60% 1-repetition maximum or 10 -15 repetitions to progressive overload and increasing resistance once repetitions have progressed to 15 reps and feel fairly light on 2 prior occasions. Develop regular home aerobic exercise program for 20 - 60 minutes at least 2 non-rehab days per week, excluding  5 - 10 minutes warm-up and cool-down periods, as tracked on home workout log.     Establish and maintain individualized heart healthy eating plan, and gradually lose 10-15 lb of body weight over the program, utilizing dietician recommendations, moderating nutrional intake, and performing regular aerobic and strength training exercises as prescribed, as evidenced by routine weights, pre- and post-program nutriton survey and routine rounding with patient, food diary, and Rate-your-Plate screening survey. Strive for blood lipid optimization with an LDL-C of <100 mg/dL or  LDL 70 mg/dL, an HDL-C of > or = 40 mg/dL for men and > or = 50 mg/dL for women, and a triglyceride level of <150 mg/dL via lifestyle education, behavioral modification, and medication compliance, as evidenced by improved post-program lipid results. Strive for HbA1C <= 7.0% via lifestyle education, behavioral modification, and medication compliance as evidenced by post program HbA1C lab results. Achieve and maintain an optimal average resting blood pressure of <120 / 80 mmHg (130/80 for DM) over the course of the program by educating patient, monitoring medication compliance, introducing and maintaining regular cardiovascular exercise program, as evidenced by routine BP monitoring. Minimize self-reported psycho-social feelings of stress over the program by educating patient, monitoring medication compliance, introducing and maintaining regular cardiovascular exercise as evidenced by pre- and post- surveys and by routine rounding with patient. Demonstrate knowledge about risk factor reduction, lifestyle modification, and heart health strategies with an increase of >=5% accuracy via pre- and post-program education assessment screening tool. Complete abstinence from tobacco products over the cardiac rehab program through intensive counseling, behavior modification, and medication compliance as evidenced by routine rounding with patient.     Electronically signed by REUBEN Bella on 8/23/22 at 10:06 AM DOUGLAS

## 2022-08-23 NOTE — PROGRESS NOTES
Phase II Cardiac Rehabilitation   Physician Order Form    Eldon Fleming  9/28/1933  [id number]  8/23/2022    [x] Phase 2 ECG Monitored Cardiac Rehabilitation    [] MI     [] PTCA with/without stents  [] CABG    [] Heart Valve Repair/Replaced   [x] Stable Angina:      [] CHF:      Onset Date: 08/17/2022    Cardiac Education Goals: (see individualized treatment plan for specific goals, progression & compliance)    Hypertension      Dietary sodium  Exercise      Home exercise     Cardiac anatomy and physiology   CAD/Interventions  Angina       Risk factors  Medications       Depression   Stress       Diabetes and HD  Heart healthy diet     Weight management       Prescribed Exercise Plan:    Submaximal exercise evaluation at program beginning and completion  Target Hr: 101-116   Initial MET Level: 2-3 METs    Duration: 31 - 60 Minutes  Frequency: 3 Days per week  Limitations: N/A    Modalities:  Treadmill   Bicycle ergometer/UBE  Seated Stepper  Rowing Machine  Weights/therabands    May increase duration per F.I.T.T. protocol parameters on average 5-10 minutes every 1-2 weeks for the first 4-6 weeks. After 3-4 weeks completed, continue to gradually increase F.I.T.T. parameters gradually at the established duration on average 0.5-1.0 METs per 30 days over the course of the remaining program, as established by patient centered goals and guidelines, maintaining RPE 12-16. Introduce 8-15 bilateral upper /lower extremity resistance exercise at 1-3 sets per lift, on 2-3 non-consecutive days using TheraBand/weights to 8-15 reps on at lease 2 occasions, maintaining RPE 12-16. Once repetition maximum has been achieved, additional weight sets may be added.       Standing Orders:  Initiate ACLS for cardiac events  Nitroglycerine 0.4mg SL every 5 minutes X 3 for angina pain  12 lead EKG for chest pain or dysrhythmia  O2 per nasal cannula as needed for SpO2 <90% with symptoms  Blood sugar monitoring for hyper/hypoglycemia symptoms  Lipid panel pre/post program as needed.

## 2022-08-29 ENCOUNTER — HOSPITAL ENCOUNTER (OUTPATIENT)
Dept: CARDIAC REHAB | Age: 87
Setting detail: THERAPIES SERIES
Discharge: HOME | End: 2022-08-29
Payer: MEDICARE

## 2022-08-29 PROCEDURE — 93798 PHYS/QHP OP CAR RHAB W/ECG: CPT

## 2022-08-31 ENCOUNTER — HOSPITAL ENCOUNTER (OUTPATIENT)
Dept: CARDIAC REHAB | Age: 87
Setting detail: THERAPIES SERIES
Discharge: HOME | End: 2022-08-31
Payer: MEDICARE

## 2022-08-31 PROCEDURE — 93798 PHYS/QHP OP CAR RHAB W/ECG: CPT

## 2022-09-01 ENCOUNTER — HOSPITAL ENCOUNTER (OUTPATIENT)
Dept: CARDIAC REHAB | Age: 87
Setting detail: THERAPIES SERIES
Discharge: HOME | End: 2022-09-01
Payer: MEDICARE

## 2022-09-01 PROCEDURE — 93798 PHYS/QHP OP CAR RHAB W/ECG: CPT

## 2022-09-05 ENCOUNTER — APPOINTMENT (OUTPATIENT)
Dept: CARDIAC REHAB | Age: 87
End: 2022-09-05
Payer: MEDICARE

## 2022-09-07 ENCOUNTER — HOSPITAL ENCOUNTER (OUTPATIENT)
Dept: CARDIAC REHAB | Age: 87
Setting detail: THERAPIES SERIES
Discharge: HOME | End: 2022-09-07
Payer: MEDICARE

## 2022-09-07 PROCEDURE — 93798 PHYS/QHP OP CAR RHAB W/ECG: CPT

## 2022-09-08 ENCOUNTER — HOSPITAL ENCOUNTER (OUTPATIENT)
Dept: CARDIAC REHAB | Age: 87
Setting detail: THERAPIES SERIES
Discharge: HOME | End: 2022-09-08
Payer: MEDICARE

## 2022-09-08 PROCEDURE — 93798 PHYS/QHP OP CAR RHAB W/ECG: CPT

## 2022-09-12 ENCOUNTER — HOSPITAL ENCOUNTER (OUTPATIENT)
Dept: CARDIAC REHAB | Age: 87
Setting detail: THERAPIES SERIES
Discharge: HOME | End: 2022-09-12
Payer: MEDICARE

## 2022-09-12 PROCEDURE — 93798 PHYS/QHP OP CAR RHAB W/ECG: CPT

## 2022-09-14 ENCOUNTER — HOSPITAL ENCOUNTER (OUTPATIENT)
Dept: CARDIAC REHAB | Age: 87
Setting detail: THERAPIES SERIES
Discharge: HOME | End: 2022-09-14
Payer: MEDICARE

## 2022-09-14 PROCEDURE — 93798 PHYS/QHP OP CAR RHAB W/ECG: CPT

## 2022-09-14 NOTE — PROGRESS NOTES
Cardiopulmonary Rehab   Medical Nutrition Therapy Food Diary Evaluation    Patient Name: Heather Guevara  Registered Dietitian:  Rober Quesada RD, JENNIFER, ASHLYN, LD  Date:  9/14/2022    Dear Leeroy Suarez,    Thank you for sharing your information about your eating patterns, it serves not only to help me see what you are eating, but you as well. Eating 3 meals a day is great way to start, I am pleased to see that you are doing that. Whole grains, fruits and vegetables, along with lean meats and low fat dairy are the cornerstones of your health. It was great to see cheerio's, whole wheat bread, banana, and mixed fruit on your food diary. I did not notice any vegetables on your food diary. With that in mind, look below for some suggestions. Your Rate Your Plate (RYP) Score was: 55, which means: you are making many healthy choices     Recommendations from the Dietitian based on your RYP and Food Diary / activity record to improve health and decrease risk factors    Limit sodium to less than 2,000 mg daily (hidden sodium and added salt combined). Increase fruit to 3 servings every day (fresh, frozen, or canned in own juice or lite syrup). Add a minimum of 2 servings of non-starchy vegetables to your meals daily (carrots, green beans, salad, etc.). Add additional sources of whole grains to your diet by eating old fashioned oatmeal to breakfast, whole wheat pasta to lunch, and brown rice at dinner. Drink 48 oz water every day. Include a minimum of 150 minutes of physical activity every week/21 minutes every day. If there are many things to change, try making change with one recommendation, then move on from there. Recommendations are based on guidelines from the American Heart Association, American Diabetes Association and current literature.     Feel free to call with questions or concerns [phone] or [phone]          Rober Quesada RD, JENNIFER GOLDBERG, LD

## 2022-09-15 ENCOUNTER — HOSPITAL ENCOUNTER (OUTPATIENT)
Dept: CARDIAC REHAB | Age: 87
Setting detail: THERAPIES SERIES
Discharge: HOME | End: 2022-09-15
Payer: MEDICARE

## 2022-09-15 PROCEDURE — 93798 PHYS/QHP OP CAR RHAB W/ECG: CPT

## 2022-09-19 ENCOUNTER — HOSPITAL ENCOUNTER (OUTPATIENT)
Dept: CARDIAC REHAB | Age: 87
Setting detail: THERAPIES SERIES
Discharge: HOME | End: 2022-09-19
Payer: MEDICARE

## 2022-09-19 PROCEDURE — 93798 PHYS/QHP OP CAR RHAB W/ECG: CPT

## 2022-09-20 NOTE — PROGRESS NOTES
Phase II Cardiac Rehab Individualized Treatment Plan - 30 Day    Patient Name: Eldon Fleming  Date: 9/20/2022  ACCOUNT #: [de-identified]  Diagnosis: Stable Angina   Onset Date: 08/17/2022  Referring Physician: Dr. Jey Hutchins  Session Number: 9  EXERCISE    Stages of Change:   [] pre-contemplation  [x] Action   [] Contemplate    [] Maintainence   [] Prep   [] Relapse          Exercise Prescription:  Mode: [x] TM [x] B [x] STP  [] R   [] UBE     Frequency: 3 days per week  Duration: 31-60 minutes  Intensity: 2.6-3 METs              THRR: 101-116  Progression:   Based on risk stratification, may increase duration per F.I.T.T. protocol parameters on average 5-10 minutes every 1-2 weeks for the first 4-6 weeks. After 3-4 weeks completed, continue to gradually increase F.I.T.T. parameters gradually at the established duration on average 0.5-1.0 METs per 30 days over the course of the remaining program, as established by patient centered goals and guidelines, maintaining RPE 12-16. [x] Resistance Training  Introduce 8-15 bilateral upper extremity resistance exercise at 1-3 sets per lift, on 2-3 non-consecutive days using TheraBand/Weights to 8-15 reps on at lease 2 occasions, maintaining RPE 12-16. Once repetition maximum has been achieved, additional weight sets may be added. Hypertension:  [x] Yes  [] No  Resting BP: 106/48  Peak Exercise BP: 144/52  BP Meds: Toprol XL    Intervention:  Home Exercise:  Current Exercise -    [] Yes - type/frequency/intensity/duration   [x] No   [] Resistance Training  Progression:  20-60 minutes of aerobic exercise on at lease 2 non-rehab days. 8-12 bilateral upper extremity resistance exercise at 1-3 sets per lift, on 2 non-consecutive days using TheraBand/Free Weights/Weight Machine to 8-15 reps on at lease 2 occasions. Once repetition maximum has been achieved, additional weight sets may be added.     Education:   [x]  Equipment [place]  [x] Understanding BP   [x] S/S to report  [] Sodium     [x] Warm up/ Cool down  [] Exercise Prescription   [x] RPE Scale   [] Hot/Cold weather guidelines   [x] Ex Safety   [] Home Exercise     [] Proper use weights/bands       Target Goal:   -Individual Exercise Plan  -BP<120/80 or <130/80 if DM   -Aerobic active 30 + minutes 5-7 days per week    Nutrition    Stages of Change:   [] pre-contemplation  [x] Action   [] Contemplate    [] Maintainence   [] Prep    [] Relapse    Hyperlipidemia:  [x] Yes  [] No   Lipid Meds: Crestor    Diabetes:  [x] Yes  [] No  FBS:   HbA1c:  [x] Monitor BS at home   Frequency?: Everyday  Diabetes Medication: Metaglip, Toujeo Max Solostar    Weight Management:  Height: 5'10  Weight: 178.2  BMI: 25.5  Wt Goal: BMI <25  Alcohol:   Social History     Substance and Sexual Activity   Alcohol Use Never     Diet Assessment Tool: Food Diary/Rate Your Plate  Special Diet:  2 gram NA+, 30% total fat, <10% saturated fat, 25-35 grams fiber, reduced cholesterol, balanced nutrition    Intervention:   [] Dietitian Consult       [] Referred to Diabetes Education     Education:  [] Heart Healthy Nutrition  [] Weight Management  [x] Portions  [] Fat/Cholesterol  [] Food Labels       [] Food Packaging Claims  [x] Snacks and Substitutes      [] Relate Diabetes/CAD    Target Goal:  -LDL-C<100 if triglycerides are > 200  -LDL-C < 70 for high risk patients  -HbA1c < 7%  -BMI < 25     Education    Stages of Change:    [] pre-contemplation  [x] Action   [] Contemplate    [] Maintainence   [] Prep    [] Relapse    Learning Barriers:   [] Speech   [] Cognitive   [] Literacy   [] Vision   [] Hearing    [] Readiness to Learn   [x] None    Family support: [x] Yes  [] No    Tobacco use:   Social History     Tobacco Use   Smoking Status Never   Smokeless Tobacco Never     Current smokers:  Longest quit attempt:  Tobacco triggers:  Barriers to successful cessation:  [] Smoking cessation medication:    Intervention:  [] Referred to physician for smoking cessation    [] Individual education and counseling  [] Tobacco adjunct    Education:   [] Cardiac A and P  [] CAD/Interventions  [] Risk Factors     [] Angina      [] Medications  [x] CHF     Target Goal:  -Complete cessation of tobacco use (if applicable)  -Continued risk factor modifications  -Recognizing signs/symptoms to report  -Proper use of meds    Psychosocial  Stages of Change:    [] pre-contemplation  [x] Action   [] Contemplate    [] Maintainence   [] Prep    [] Relapse    Psychosocial Test:  Tool Used: [employer] Quality of Life/PHQ9    Intervention:   [] Psych Consult/   [] Uses stress management skills    [] Physician Referral     Medications:     Education:    [] Stress Management   [] Depression    Target Goal:  -Assess presence or absence of depression using a valid screening tool. -Maximize coping skills.  -Positive support system.     Preventative Medication:   [x] Aspirin       [x] Beta Blockade      [x] Statin or other lipid lowering agent     [] Antiplatelet   [] ACE Inhibitor/ARB   [x] Anticoagulant  Medication Compliance (stated):  100  [x] 100%  [] 75%           [] 50%  [] 25%      [] 0%       Target Goal:  -100% medication adherence      Risk Stratification (of untoward event during exercise):  [] HIGH RISK  LVEF < 40%  Survivor of cardiac arrest or sudden cardiac death  Complex ventricular arrythmias (VT >6 beats, multifocal PVCs at rest or with exercise)  Presence of angina or other significant symptoms (shortness of breath, light-headedness, or dizziness at <5 METs or during recovery  MI or cardiac surgery complicated by cardiogenic shock, CHF, and/or s/s of post-procedure ischemia  Abnormal hemodynamics with exercise, especially flat or decreasing systolic BP or chronotropic incompetence with ^ workload  Significant silent ischemia (ST depression >/= 2mm without symptoms with exercise or in recovery  Signs/symptoms including angina, dizziness, light-headedness or dyspnea with low exercise levels or in recovery  Clinically significant depression   Physically Inactive - <= 5 METs  Implantable cardioverter defibrillator (ICD)  At least 4 of below:   Lipids LDL >130 / Cholesterol > 200  BP >160/100  BMI >30  Smoker who continues to smoke  DM - HgbA1C >=7  [x] MODERATE RISK  LVEF 40-49%  Mild to moderate level of silent ischemia during exercise testing or recovery (ST depression <2mm from baseline)  Presence of stable angina or other significant symptoms (unusual shortness of breath, light-headedness, or dizziness occurring only at high level of exertion [>=7 METs])  Functional capacity 5.1-8.9 METs  At least 2-3 of below:  Lipids -129 / Cholesterol 166-932  BP - systolic 368-621/YZCDHYQJK 76-00  BMI >20  DM - HgbA1C 5.8-7.0  [] LOW RISK  Rest LVEF >= 50%  No resting or exercise induced complex dysrhythmias  Uncomplicate MI, CABG, angioplasty, atherectomy, or stent  Normal hemodynamic and EGC response with exercise and in recovery (appropriate increases and decreases in HR and SBP with increasing workloads and recovery  Functional capacity >9 METs  Absence of angina or other significant symptoms (unusual shortness of breath, light-headedness, or dizziness during exercise and recovery)  Absence of complicated ventricular arrhythmias at rest  Absence of HF  Absence of signs/symptoms of post-event or post-procedure ischemia  Absence of clinical depression   Non-smoker   0-1 Uncontrolled risk factors    Fall Risk Assessment:  History of falls with or without injury  [x] Yes   [] No   Use of ambulatory aid  [] Yes  [x] No   Difficulty walking/impaired gait  [] Yes  [x] No   Numbness in feet  [x] Yes   [] No   Vision changes  [] Yes  [x] No   Dizziness  [] Yes  [x] No   Shortness of breath  [x] Yes  [] No   Medications  [x] Anticoagulant/Antiplatelet  [x] Betablocker /ACE/ARB  [] Antidepressant  [] Seizure medication   [] NA  Risk of fall  [] Low (none of above)  [] Medium (less than 3 above)  [x] High (3 or more above)    Patient 90-Day Goals: (Specific, Measurable, Achievable, Relevant, and Time-Bound)  \"Increase more strength in legs without being tired within the next 90 days. \"    30-Day Program Goals:   Achieve and progress prescribed exercise frequency, intensity, time, and type based upon initial evaluation and submaximal graded exercise results as evidenced by the attaining and maintaining the prescribed target heart rate range, a Yumiko rating of perceived exertion between 11 and 16, duration of >30 - 60 minutes using multiple exercise modes for at least 3 days/week, progressing at least 0.5-1.0 METs/week as tolerated, as evidenced by daily session reports and pre/post MET levels. Introduce and progress 8-10 different bilateral UE and/or LE progressive resistance exercises focused on major muscle groups using 1-3 sets each per lift, on 2-3 non-consecutive days implementing free and machine weights and/or TheraBands, as appropriate, with a resistance of 40-60% 1-repetition maximum or 10 -15 repetitions to progressive overload and increasing resistance once repetitions have progressed to 15 reps and feel fairly light on 2 prior occasions. Patient has been introduced to resistance training. Develop regular home aerobic exercise program for 20 - 60 minutes at least 2 non-rehab days per week, excluding  5 - 10 minutes warm-up and cool-down periods, as tracked on home workout log. Establish and maintain individualized heart healthy eating plan, and gradually lose 2-4 lb of body weight over the next 30 days, utilizing dietician recommendations, moderating nutrional intake, and performing regular aerobic and strength training exercises as prescribed, as evidenced by pre- and post-program nutriton survey and routine rounding with patient, food diary, and Rate-your-Plate screening survey. Patient has maintained weight over the past 30 days.      Strive for blood lipid optimization over the next 30 days with an LDL-C of <100 mg/dL or  LDL 70 mg/dL, an HDL-C of > or = 40 mg/dL for men and > or = 50 mg/dL for women, and a triglyceride level of <150 mg/dL via lifestyle education, behavioral modification, and medication compliance, as evidenced by improved post-program lipid results. Strive for HbA1C <= 7.0% over the next 30 days, via lifestyle education, behavioral modification, and medication compliance as evidenced by post program HbA1C lab results. Achieve and maintain an optimal average resting blood pressure of <120 / 80 mmHg (130/80 for DM) over the next 30 days by educating patient, monitoring medication compliance, introducing and maintaining regular cardiovascular exercise program as evidenced by routine BP monitoring. Minimize self-reported psycho-social feelings of stress over the next 30 days by educating patient, monitoring medication compliance, introducing and maintaining regular cardiovascular exercise as evidenced by pre- and post- surveys and by routine rounding with patient. Demonstrate knowledge about risk factor reduction, lifestyle modification, and heart health strategies over the next 30 days, with an increase of >=5% accuracy via pre- and post-program education assessment screening tool. Complete abstinence from tobacco products over the next 30 days through intensive counseling, behavior modification, and medication compliance as evidenced by routine rounding with patient. Patient has remained tobacco free.     Electronically signed by Dwane Cranker, EP on 9/20/22 at 10:54 AM EDT

## 2022-09-21 ENCOUNTER — HOSPITAL ENCOUNTER (OUTPATIENT)
Dept: CARDIAC REHAB | Age: 87
Setting detail: THERAPIES SERIES
Discharge: HOME | End: 2022-09-21
Payer: MEDICARE

## 2022-09-21 PROCEDURE — 93798 PHYS/QHP OP CAR RHAB W/ECG: CPT

## 2022-09-22 ENCOUNTER — HOSPITAL ENCOUNTER (OUTPATIENT)
Dept: CARDIAC REHAB | Age: 87
Setting detail: THERAPIES SERIES
Discharge: HOME | End: 2022-09-22
Payer: MEDICARE

## 2022-09-22 PROCEDURE — 93798 PHYS/QHP OP CAR RHAB W/ECG: CPT

## 2022-09-26 ENCOUNTER — HOSPITAL ENCOUNTER (OUTPATIENT)
Dept: CARDIAC REHAB | Age: 87
Setting detail: THERAPIES SERIES
Discharge: HOME | End: 2022-09-26
Payer: MEDICARE

## 2022-09-26 PROCEDURE — 93798 PHYS/QHP OP CAR RHAB W/ECG: CPT

## 2022-09-28 ENCOUNTER — HOSPITAL ENCOUNTER (OUTPATIENT)
Dept: CARDIAC REHAB | Age: 87
Setting detail: THERAPIES SERIES
Discharge: HOME | End: 2022-09-28
Payer: MEDICARE

## 2022-09-28 PROCEDURE — 93798 PHYS/QHP OP CAR RHAB W/ECG: CPT

## 2022-09-29 ENCOUNTER — HOSPITAL ENCOUNTER (OUTPATIENT)
Dept: CARDIAC REHAB | Age: 87
Setting detail: THERAPIES SERIES
Discharge: HOME | End: 2022-09-29
Payer: MEDICARE

## 2022-09-29 PROCEDURE — 93798 PHYS/QHP OP CAR RHAB W/ECG: CPT

## 2022-10-03 ENCOUNTER — HOSPITAL ENCOUNTER (OUTPATIENT)
Dept: CARDIAC REHAB | Age: 87
Setting detail: THERAPIES SERIES
Discharge: HOME | End: 2022-10-03
Payer: MEDICARE

## 2022-10-03 PROCEDURE — 93798 PHYS/QHP OP CAR RHAB W/ECG: CPT

## 2022-10-05 ENCOUNTER — HOSPITAL ENCOUNTER (OUTPATIENT)
Dept: CARDIAC REHAB | Age: 87
Setting detail: THERAPIES SERIES
Discharge: HOME | End: 2022-10-05
Payer: MEDICARE

## 2022-10-05 PROCEDURE — 93798 PHYS/QHP OP CAR RHAB W/ECG: CPT

## 2022-10-06 ENCOUNTER — HOSPITAL ENCOUNTER (OUTPATIENT)
Dept: CARDIAC REHAB | Age: 87
Setting detail: THERAPIES SERIES
Discharge: HOME | End: 2022-10-06
Payer: MEDICARE

## 2022-10-06 PROCEDURE — 93798 PHYS/QHP OP CAR RHAB W/ECG: CPT

## 2022-10-10 ENCOUNTER — HOSPITAL ENCOUNTER (OUTPATIENT)
Dept: CARDIAC REHAB | Age: 87
Setting detail: THERAPIES SERIES
Discharge: HOME | End: 2022-10-10
Payer: MEDICARE

## 2022-10-10 PROCEDURE — 93798 PHYS/QHP OP CAR RHAB W/ECG: CPT

## 2022-10-12 ENCOUNTER — HOSPITAL ENCOUNTER (OUTPATIENT)
Dept: CARDIAC REHAB | Age: 87
Setting detail: THERAPIES SERIES
Discharge: HOME | End: 2022-10-12
Payer: MEDICARE

## 2022-10-12 PROCEDURE — 93798 PHYS/QHP OP CAR RHAB W/ECG: CPT

## 2022-10-13 ENCOUNTER — HOSPITAL ENCOUNTER (OUTPATIENT)
Dept: CARDIAC REHAB | Age: 87
Setting detail: THERAPIES SERIES
Discharge: HOME | End: 2022-10-13
Payer: MEDICARE

## 2022-10-13 PROCEDURE — 93798 PHYS/QHP OP CAR RHAB W/ECG: CPT

## 2022-10-17 ENCOUNTER — HOSPITAL ENCOUNTER (OUTPATIENT)
Dept: CARDIAC REHAB | Age: 87
Setting detail: THERAPIES SERIES
Discharge: HOME | End: 2022-10-17
Payer: MEDICARE

## 2022-10-17 PROCEDURE — 93798 PHYS/QHP OP CAR RHAB W/ECG: CPT

## 2022-10-18 ENCOUNTER — HOSPITAL ENCOUNTER (OUTPATIENT)
Dept: CARDIAC REHAB | Age: 87
Discharge: HOME | End: 2022-10-18

## 2022-10-18 NOTE — PROGRESS NOTES
Phase II Cardiac Rehab Individualized Treatment Plan - 61 Day    Patient Name: Kelly Marcano  Date: 10/18/2022  ACCOUNT #: [de-identified]  Diagnosis: Stable Angina   Onset Date: 8/17/22  Referring Physician: Dr. Daphney Blas  Session Number: 24   EXERCISE    Stages of Change:   [] pre-contemplation  [x] Action   [] Contemplate    [] Maintenance   [] Prep   [] Relapse          Exercise Prescription:  Mode: [x] TM [x] B [x] STP  [] R   [x] UBE     Frequency: 3 days per week  Duration: 31-60 minutes  Intensity: 3-3.7 METs             THRR:  101-116  Progression:   Based on risk stratification, may increase duration per F.I.T.T. protocol parameters on average 5-10 minutes every 1-2 weeks for the first 4-6 weeks. After 3-4 weeks completed, continue to gradually increase F.I.T.T. parameters gradually at the established duration on average 0.5-1.0 METs per 30 days over the course of the remaining program, as established by patient centered goals and guidelines, maintaining RPE 12-16. [x] Resistance Training  Introduce 8-15 bilateral upper extremity resistance exercise at 1-3 sets per lift, on 2-3 non-consecutive days using TheraBand/Weights to 8-15 reps on at lease 2 occasions, maintaining RPE 12-16. Once repetition maximum has been achieved, additional weight sets may be added. Hypertension:  [x] Yes  [] No  Resting BP: 122/70  Peak Exercise BP: 148/56  BP Meds: Toprol XL    Intervention:  Home Exercise:  Current Exercise -    [x] Yes - TM daily   [] No   [] Resistance Training  Progression:  20-60 minutes of aerobic exercise on at lease 2 non-rehab days. 8-12 bilateral upper extremity resistance exercise at 1-3 sets per lift, on 2 non-consecutive days using TheraBand/Free Weights/Weight Machine to 8-15 reps on at lease 2 occasions. Once repetition maximum has been achieved, additional weight sets may be added.     Education:   [x]  Equipment [place]  [x] Understanding BP   [x] S/S to report  [] Sodium     [x] Warm up/ Cool down  [x] Exercise Prescription   [x] RPE Scale   [] Hot/Cold weather guidelines   [x] Ex Safety   [] Home Exercise     [] Proper use weights/bands       Target Goal:   -Individual Exercise Plan  -BP<120/80 or <130/80 if DM   -Aerobic active 30 + minutes 5-7 days per week    Nutrition    Stages of Change:   [] pre-contemplation  [x] Action   [] Contemplate    [] Maintenance   [] Prep    [] Relapse    Hyperlipidemia:  [x] Yes  [] No   Lipid Meds: Crestor     Diabetes:  [x] Yes  [] No  FBS: 70  HbA1c:  [x] Monitor BS at home   Frequency?: 2-3x daily  Diabetes Medication: Metaglip, insulin    Weight Management:  Height: 5'10\"  Weight: 176.6  BMI: 25.3  Wt Goal: BMI <25  Alcohol:   Social History     Substance and Sexual Activity   Alcohol Use Never     Diet Assessment Tool: Food Diary/Rate Your Plate  Special Diet:  2 gram NA+, 30% total fat, <10% saturated fat, 25-35 grams fiber, reduced cholesterol, balanced nutrition    Intervention:   [] Dietitian Consult       [] Referred to Diabetes Education     Education:  [x] Heart Healthy Nutrition  [] Weight Management  [x] Portions  [x] Fat/Cholesterol  [x] Food Labels       [] Food Packaging Claims  [x] Snacks and Substitutes      [] Relate Diabetes/CAD    Target Goal:  -LDL-C<100 if triglycerides are > 200  -LDL-C < 70 for high risk patients  -HbA1c < 7%  -BMI < 25     Education    Stages of Change:    [] pre-contemplation  [x] Action   [] Contemplate    [] Maintenance   [] Prep    [] Relapse    Learning Barriers:   [] Speech   [] Cognitive   [] Literacy   [] Vision   [] Hearing    [] Readiness to Learn   [x] None    Family support: [x] Yes  [] No    Tobacco use:   Social History     Tobacco Use   Smoking Status Never   Smokeless Tobacco Never     Current smokers:  Longest quit attempt:  Tobacco triggers:  Barriers to successful cessation:  [] Smoking cessation medication:    Intervention:  [] Referred to physician for smoking cessation    [] Individual education and counseling  [] Tobacco adjunct    Education:   [x] Cardiac A and P  [x] CAD/Interventions  [x] Risk Factors     [x] Angina      [] Medications  [x] CHF     Target Goal:  -Complete cessation of tobacco use (if applicable)  -Continued risk factor modifications  -Recognizing signs/symptoms to report  -Proper use of meds    Psychosocial  Stages of Change:    [] pre-contemplation  [x] Action   [] Contemplate    [] Maintenance   [] Prep    [] Relapse    Psychosocial Test:  Tool Used: [employer] Quality of Life/PHQ9    Intervention:   [] Psych Consult/   [] Physician Referral     Medications:     Education:    [x] Stress Management   [x] Depression    Target Goal:  -Assess presence or absence of depression using a valid screening tool. -Maximize coping skills.  -Positive support system.     Preventative Medication:   [x] Aspirin       [x] Beta Blockade      [x] Statin or other lipid lowering agent     [] Antiplatelet   [] ACE Inhibitor/ARB   [x] Anticoagulant  Medication Compliance (stated):    [x] 100%  [] 75%           [] 50%  [] 25%      [] 0%       Target Goal:  -100% medication adherence      Risk Stratification (of untoward event during exercise):  [] HIGH RISK  LVEF < 40%  Survivor of cardiac arrest or sudden cardiac death  Complex ventricular arrythmias (VT >6 beats, multifocal PVCs at rest or with exercise)  Presence of angina or other significant symptoms (shortness of breath, light-headedness, or dizziness at <5 METs or during recovery  MI or cardiac surgery complicated by cardiogenic shock, CHF, and/or s/s of post-procedure ischemia  Abnormal hemodynamics with exercise, especially flat or decreasing systolic BP or chronotropic incompetence with ^ workload  Significant silent ischemia (ST depression >/= 2mm without symptoms with exercise or in recovery  Signs/symptoms including angina, dizziness, light-headedness or dyspnea with low exercise levels or in recovery  Clinically significant depression   Physically Inactive - <= 5 METs  Implantable cardioverter defibrillator (ICD)  At least 4 of below:   Lipids LDL >130 / Cholesterol > 200  BP >160/100  BMI >30  Smoker who continues to smoke  DM - HgbA1C >=7  [x] MODERATE RISK  LVEF 40-49%  Mild to moderate level of silent ischemia during exercise testing or recovery (ST depression <2mm from baseline)  Presence of stable angina or other significant symptoms (unusual shortness of breath, light-headedness, or dizziness occurring only at high level of exertion [>=7 METs])  Functional capacity 5.1-8.9 METs  At least 2-3 of below:  Lipids -129 / Cholesterol 435-744  BP - systolic 299-351/IBEEQNKWK 90-96  BMI >79  DM - HgbA1C 5.8-7.0  [] LOW RISK  Rest LVEF >= 50%  No resting or exercise induced complex dysrhythmias  Uncomplicate MI, CABG, angioplasty, atherectomy, or stent  Normal hemodynamic and EGC response with exercise and in recovery (appropriate increases and decreases in HR and SBP with increasing workloads and recovery  Functional capacity >9 METs  Absence of angina or other significant symptoms (unusual shortness of breath, light-headedness, or dizziness during exercise and recovery)  Absence of complicated ventricular arrhythmias at rest  Absence of HF  Absence of signs/symptoms of post-event or post-procedure ischemia  Absence of clinical depression   Non-smoker   0-1 Uncontrolled risk factors    Fall Risk Assessment:  History of falls with or without injury  [x] Yes   [] No   Use of ambulatory aid  [] Yes  [x] No   Difficulty walking/impaired gait  [] Yes  [x] No   Numbness in feet  [x] Yes   [] No   Vision changes  [] Yes  [x] No   Dizziness  [] Yes  [x] No   Shortness of breath  [x] Yes  [] No   Medications  [x] Anticoagulant/Antiplatelet  [x] Betablocker /ACE/ARB  [] Antidepressant  [] Seizure medication   [] NA  Risk of fall  [] Low (none of above)  [] Medium (less than 3 above)  [x] High (3 or more above)    Patient 90-Day Goals: (Specific, Measurable, Achievable, Relevant, and Time-Bound)  \"Increase more strength in legs without being tired within the next 90 days. \"    30-Day Program Goals:   Achieve and progress prescribed exercise frequency, intensity, time, and type based upon initial evaluation and submaximal graded exercise results as evidenced by the attaining and maintaining the prescribed target heart rate range, a Yumiko rating of perceived exertion between 11 and 16, duration of >30 - 60 minutes using multiple exercise modes for at least 3 days/week, progressing at least 0.5-1.0 METs/week as tolerated, as evidenced by daily session reports and pre/post MET levels. 4.3% increase in FC in last 30 days. Introduce and progress 8-10 different bilateral UE and/or LE progressive resistance exercises focused on major muscle groups using 1-3 sets each per lift, on 2-3 non-consecutive days implementing free and machine weights and/or bands, as appropriate, with a resistance of 40-60% 1-repetition maximum or 10 -15 repetitions to progressive overload and increasing resistance once repetitions have progressed to 15 reps and feel fairly light on 2 prior occasions. Participates in lower body strengthening     Develop regular home aerobic exercise program for 20 - 60 minutes at least 2 non-rehab days per week, excluding  5 - 10 minutes warm-up and cool-down periods, as tracked on home workout log. Walks on TM daily for 10-25 min    Establish and maintain individualized heart healthy eating plan, and gradually lose 2-4 lb of body weight over the next 30 days, utilizing dietician recommendations, moderating nutrional intake, and performing regular aerobic and strength training exercises as prescribed, as evidenced by pre- and post-program nutriton survey and routine rounding with patient, food diary, and Rate-your-Plate screening survey.  Pt has not gained nor lost any weight in past 30 days    Strive for blood lipid optimization over the next 30 days with an LDL-C of <100 mg/dL or  LDL 70 mg/dL, an HDL-C of > or = 40 mg/dL for men and > or = 50 mg/dL for women, and a triglyceride level of <150 mg/dL via lifestyle education, behavioral modification, and medication compliance, as evidenced by improved post-program lipid results. Strive for HbA1C <= 7.0% over the next 30 days, via lifestyle education, behavioral modification, and medication compliance as evidenced by post program HbA1C lab results. Achieve and maintain an optimal average resting blood pressure of <120 / 80 mmHg (130/80 for DM) over the next 30 days by educating patient, monitoring medication compliance, introducing and maintaining regular cardiovascular exercise program as evidenced by routine BP monitoring. Minimize self-reported psycho-social feelings of stress over the next 30 days by educating patient, monitoring medication compliance, introducing and maintaining regular cardiovascular exercise as evidenced by pre- and post- surveys and by routine rounding with patient. Demonstrate knowledge about risk factor reduction, lifestyle modification, and heart health strategies over the next 30 days, with an increase of >=5% accuracy via pre- and post-program education assessment screening tool. Complete abstinence from tobacco products over the next 30 days through intensive counseling, behavior modification, and medication compliance as evidenced by routine rounding with patient.     Electronically signed by Rigoberto Pacheco on 10/18/22 at 8:20 AM EDT

## 2022-10-19 ENCOUNTER — HOSPITAL ENCOUNTER (OUTPATIENT)
Dept: CARDIAC REHAB | Age: 87
Setting detail: THERAPIES SERIES
Discharge: HOME | End: 2022-10-19
Payer: MEDICARE

## 2022-10-19 PROCEDURE — 93798 PHYS/QHP OP CAR RHAB W/ECG: CPT

## 2022-10-20 ENCOUNTER — HOSPITAL ENCOUNTER (OUTPATIENT)
Dept: CARDIAC REHAB | Age: 87
Setting detail: THERAPIES SERIES
Discharge: HOME | End: 2022-10-20
Payer: MEDICARE

## 2022-10-20 PROCEDURE — 93798 PHYS/QHP OP CAR RHAB W/ECG: CPT

## 2022-10-24 ENCOUNTER — HOSPITAL ENCOUNTER (OUTPATIENT)
Dept: CARDIAC REHAB | Age: 87
Setting detail: THERAPIES SERIES
Discharge: HOME | End: 2022-10-24
Payer: MEDICARE

## 2022-10-24 PROCEDURE — 93798 PHYS/QHP OP CAR RHAB W/ECG: CPT

## 2022-10-26 ENCOUNTER — HOSPITAL ENCOUNTER (OUTPATIENT)
Dept: CARDIAC REHAB | Age: 87
Setting detail: THERAPIES SERIES
Discharge: HOME | End: 2022-10-26
Payer: MEDICARE

## 2022-10-26 PROCEDURE — 93798 PHYS/QHP OP CAR RHAB W/ECG: CPT

## 2022-10-27 ENCOUNTER — HOSPITAL ENCOUNTER (OUTPATIENT)
Dept: CARDIAC REHAB | Age: 87
Setting detail: THERAPIES SERIES
Discharge: HOME | End: 2022-10-27
Payer: MEDICARE

## 2022-10-27 PROCEDURE — 93798 PHYS/QHP OP CAR RHAB W/ECG: CPT

## 2022-10-31 ENCOUNTER — HOSPITAL ENCOUNTER (OUTPATIENT)
Dept: CARDIAC REHAB | Age: 87
Setting detail: THERAPIES SERIES
Discharge: HOME | End: 2022-10-31
Payer: MEDICARE

## 2022-10-31 PROCEDURE — 93798 PHYS/QHP OP CAR RHAB W/ECG: CPT

## 2022-11-02 ENCOUNTER — HOSPITAL ENCOUNTER (OUTPATIENT)
Dept: CARDIAC REHAB | Age: 87
Setting detail: THERAPIES SERIES
Discharge: HOME | End: 2022-11-02
Payer: MEDICARE

## 2022-11-02 PROCEDURE — 93798 PHYS/QHP OP CAR RHAB W/ECG: CPT

## 2022-11-03 ENCOUNTER — HOSPITAL ENCOUNTER (OUTPATIENT)
Dept: CARDIAC REHAB | Age: 87
Setting detail: THERAPIES SERIES
Discharge: HOME | End: 2022-11-03
Payer: MEDICARE

## 2022-11-03 PROCEDURE — 93798 PHYS/QHP OP CAR RHAB W/ECG: CPT

## 2022-11-07 ENCOUNTER — HOSPITAL ENCOUNTER (OUTPATIENT)
Dept: CARDIAC REHAB | Age: 87
Setting detail: THERAPIES SERIES
Discharge: HOME | End: 2022-11-07
Payer: MEDICARE

## 2022-11-07 PROCEDURE — 93798 PHYS/QHP OP CAR RHAB W/ECG: CPT

## 2022-11-09 ENCOUNTER — HOSPITAL ENCOUNTER (OUTPATIENT)
Dept: CARDIAC REHAB | Age: 87
Setting detail: THERAPIES SERIES
Discharge: HOME | End: 2022-11-09
Payer: MEDICARE

## 2022-11-09 PROCEDURE — 93798 PHYS/QHP OP CAR RHAB W/ECG: CPT

## 2022-11-10 ENCOUNTER — HOSPITAL ENCOUNTER (OUTPATIENT)
Dept: CARDIAC REHAB | Age: 87
Setting detail: THERAPIES SERIES
Discharge: HOME | End: 2022-11-10
Payer: MEDICARE

## 2022-11-10 PROCEDURE — 93798 PHYS/QHP OP CAR RHAB W/ECG: CPT

## 2022-11-14 ENCOUNTER — HOSPITAL ENCOUNTER (OUTPATIENT)
Dept: CARDIAC REHAB | Age: 87
Setting detail: THERAPIES SERIES
Discharge: HOME | End: 2022-11-14
Payer: MEDICARE

## 2022-11-14 PROCEDURE — 93798 PHYS/QHP OP CAR RHAB W/ECG: CPT

## 2022-11-15 NOTE — PROGRESS NOTES
Phase II Cardiac Rehab Individualized Treatment Plan - 80 Day    Patient Name: Malena Gomez  Date: 11/15/2022  ACCOUNT #: [de-identified]  Diagnosis: Stable Angina   Onset Date: 8/17/22  Referring Physician: Dr. Melany Lopez  Session Number: 35   EXERCISE    Stages of Change:   [] pre-contemplation  [x] Action   [] Contemplate    [] Maintenance   [] Prep   [] Relapse          Exercise Prescription:  Mode: [x] TM [x] B [x] STP  [] R   [x] UBE     Frequency: 3 days per week  Duration: 31-60 minutes  Intensity: 3.5-3.9             THRR: 101-116  Progression:   Based on risk stratification, may increase duration per F.I.T.T. protocol parameters on average 5-10 minutes every 1-2 weeks for the first 4-6 weeks. After 3-4 weeks completed, continue to gradually increase F.I.T.T. parameters gradually at the established duration on average 0.5-1.0 METs per 30 days over the course of the remaining program, as established by patient centered goals and guidelines, maintaining RPE 12-16. [x] Resistance Training  Introduce 8-15 bilateral upper extremity resistance exercise at 1-3 sets per lift, on 2-3 non-consecutive days using TheraBand/Weights to 8-15 reps on at lease 2 occasions, maintaining RPE 12-16. Once repetition maximum has been achieved, additional weight sets may be added. Hypertension:  [x] Yes  [] No  Resting BP: 124/56  Peak Exercise BP: 142/54  BP Meds: Toprol Xl    Intervention:  Home Exercise:  Current Exercise -    [x] Yes - walks on TM 3-4x/week for 15-20 min   [] No   [] Resistance Training  Progression:  20-60 minutes of aerobic exercise on at lease 2 non-rehab days. 8-12 bilateral upper extremity resistance exercise at 1-3 sets per lift, on 2 non-consecutive days using TheraBand/Free Weights/Weight Machine to 8-15 reps on at lease 2 occasions. Once repetition maximum has been achieved, additional weight sets may be added.     Education:   [x]  Equipment [place]  [x] Understanding BP   [x] S/S to report  [x] Sodium     [x] Warm up/ Cool down  [x] Exercise Prescription   [x] RPE Scale   [x] Hot/Cold weather guidelines   [x] Ex Safety   [x] Home Exercise     [x] Proper use weights/bands       Target Goal:   -Individual Exercise Plan  - Resting BP <130/80 mmHg  -Aerobic active 30 + minutes 5-7 days per week    Nutrition    Stages of Change:   [] pre-contemplation  [x] Action   [] Contemplate    [] Maintenance   [] Prep    [] Relapse    Hyperlipidemia:  [x] Yes  [] No   Lipid Meds: Crestor     Diabetes:  [x] Yes  [] No  FBS: 126  HbA1c:  [x] Monitor BS at home   Frequency?: PRN  Diabetes Medication: [place], Insulin    Weight Management:  Height: 5'10\"  Weight: 176.3  BMI: 25.2  Wt Goal: BMI <25  Alcohol:   Social History     Substance and Sexual Activity   Alcohol Use Never     Diet Assessment Tool: Food Diary/Rate Your Plate  Special Diet:  2 gram NA+, 30% total fat, <10% saturated fat, 25-35 grams fiber, reduced cholesterol, balanced nutrition    Intervention:   [] Dietitian Consult       [] Referred to Diabetes Education     Education:  [x] Heart Healthy Nutrition  [x] Weight Management  [x] Portions  [x] Fat/Cholesterol  [x] Food Labels       [x] Food Packaging Claims  [x] Snacks and Substitutes      [x] Relate Diabetes/CAD    Target Goal:  -LDL-C<100 if triglycerides are > 200  -LDL-C < 70 for high risk patients  -HbA1c < 7%  -BMI < 25     Education    Stages of Change:    [] pre-contemplation  [x] Action   [] Contemplate    [] Maintenance   [] Prep    [] Relapse    Learning Barriers:   [] Speech   [] Cognitive   [] Literacy   [] Vision   [] Hearing    [] Readiness to Learn   [x] None    Family support: [x] Yes  [] No    Tobacco use:   Social History     Tobacco Use   Smoking Status Never   Smokeless Tobacco Never     Current smokers:  Longest quit attempt:  Tobacco triggers:  Barriers to successful cessation:  [] Smoking cessation medication:    Intervention:  [] Referred to physician for smoking cessation    [] recovery  Clinically significant depression   Physically Inactive - <= 5 METs  Implantable cardioverter defibrillator (ICD)  At least 4 of below:   Lipids LDL >130 / Cholesterol > 200  BP >160/100  BMI >30  Smoker who continues to smoke  DM - HgbA1C >=7  [x] MODERATE RISK  LVEF 40-49%  Mild to moderate level of silent ischemia during exercise testing or recovery (ST depression <2mm from baseline)  Presence of stable angina or other significant symptoms (unusual shortness of breath, light-headedness, or dizziness occurring only at high level of exertion [>=7 METs])  Functional capacity 5.1-8.9 METs  At least 2-3 of below:  Lipids -129 / Cholesterol 724-612  BP - systolic 925-729/XJDWYPGLH 05-19  BMI >24  DM - HgbA1C 5.8-7.0  [] LOW RISK  Rest LVEF >= 50%  No resting or exercise induced complex dysrhythmias  Uncomplicate MI, CABG, angioplasty, atherectomy, or stent  Normal hemodynamic and EGC response with exercise and in recovery (appropriate increases and decreases in HR and SBP with increasing workloads and recovery  Functional capacity >9 METs  Absence of angina or other significant symptoms (unusual shortness of breath, light-headedness, or dizziness during exercise and recovery)  Absence of complicated ventricular arrhythmias at rest  Absence of HF  Absence of signs/symptoms of post-event or post-procedure ischemia  Absence of clinical depression   Non-smoker   0-1 Uncontrolled risk factors    Fall Risk Assessment:  History of falls with or without injury  [x] Yes   [] No   Use of ambulatory aid  [] Yes  [x] No   Difficulty walking/impaired gait  [] Yes  [x] No   Numbness in feet  [x] Yes   [] No   Vision changes  [] Yes  [x] No   Dizziness  [] Yes  [x] No   Shortness of breath  [x] Yes  [] No   Medications  [x] Anticoagulant/Antiplatelet  [x] Betablocker /ACE/ARB  [] Antidepressant  [] Seizure medication   [] NA  Risk of fall  [] Low (none of above)  [] Medium (less than 3 above)  [x] High (3 or more above)    Patient 90-Day Goals: (Specific, Measurable, Achievable, Relevant, and Time-Bound)  \"Increase more strength in legs without being tired within the next 90 days. \"    30-Day Program Goals:   Achieve and progress prescribed exercise frequency, intensity, time, and type based upon initial evaluation and submaximal graded exercise results as evidenced by the attaining and maintaining the prescribed target heart rate range, a Yumiko rating of perceived exertion between 11 and 16, duration of >30 - 60 minutes using multiple exercise modes for at least 3 days/week, progressing at least 0.5-1.0 METs/week as tolerated, as evidenced by daily session reports and pre/post MET levels. Increased FC by 1.2% over the past 30 days    Introduce and progress 8-10 different bilateral UE and/or LE progressive resistance exercises focused on major muscle groups using 1-3 sets each per lift, on 2-3 non-consecutive days implementing free and machine weights and/or bands, as appropriate, with a resistance of 40-60% 1-repetition maximum or 10 -15 repetitions to progressive overload and increasing resistance once repetitions have progressed to 15 reps and feel fairly light on 2 prior occasions. Educated on resistance bands     Develop regular home aerobic exercise program for 20 - 60 minutes at least 2 non-rehab days per week, excluding  5 - 10 minutes warm-up and cool-down periods, as tracked on home workout log. Exercising 3-4x/week on TM for 15-20 min    Establish and maintain individualized heart healthy eating plan, and gradually lose 2-4 lb of body weight over the next 30 days, utilizing dietician recommendations, moderating nutrional intake, and performing regular aerobic and strength training exercises as prescribed, as evidenced by pre- and post-program nutriton survey and routine rounding with patient, food diary, and Rate-your-Plate screening survey.  Lost 0.9 pounds since beginning of program    Strive for blood lipid optimization over the next 30 days with an LDL-C of <100 mg/dL or  LDL 70 mg/dL, an HDL-C of > or = 40 mg/dL for men and > or = 50 mg/dL for women, and a triglyceride level of <150 mg/dL via lifestyle education, behavioral modification, and medication compliance, as evidenced by improved post-program lipid results. Strive for HbA1C <= 7.0% over the next 30 days, via lifestyle education, behavioral modification, and medication compliance as evidenced by post program HbA1C lab results. Achieve and maintain an optimal average resting blood pressure of <130 / 80 mmHg over the next 30 days by educating patient, monitoring medication compliance, introducing and maintaining regular cardiovascular exercise program as evidenced by routine BP monitoring. BP runs 110s/120s-50s    Minimize self-reported psycho-social feelings of stress over the next 30 days by educating patient, monitoring medication compliance, introducing and maintaining regular cardiovascular exercise as evidenced by pre- and post- surveys and by routine rounding with patient. Demonstrate knowledge about risk factor reduction, lifestyle modification, and heart health strategies over the next 30 days, with an increase of >=5% accuracy via pre- and post-program education assessment screening tool. Complete abstinence from tobacco products over the next 30 days through intensive counseling, behavior modification, and medication compliance as evidenced by routine rounding with patient.     Electronically signed by Jayant Coulter CEP on 11/15/22 at 8:23 AM EST

## 2022-11-16 ENCOUNTER — HOSPITAL ENCOUNTER (OUTPATIENT)
Dept: CARDIAC REHAB | Age: 87
Setting detail: THERAPIES SERIES
Discharge: HOME | End: 2022-11-16
Payer: MEDICARE

## 2022-11-16 PROCEDURE — 93798 PHYS/QHP OP CAR RHAB W/ECG: CPT

## 2022-11-17 ENCOUNTER — HOSPITAL ENCOUNTER (OUTPATIENT)
Dept: CARDIAC REHAB | Age: 87
Setting detail: THERAPIES SERIES
Discharge: HOME | End: 2022-11-17
Payer: MEDICARE

## 2022-11-17 PROCEDURE — 93798 PHYS/QHP OP CAR RHAB W/ECG: CPT

## 2022-11-21 ENCOUNTER — HOSPITAL ENCOUNTER (OUTPATIENT)
Dept: CARDIAC REHAB | Age: 87
Setting detail: THERAPIES SERIES
Discharge: HOME | End: 2022-11-21
Payer: MEDICARE

## 2022-11-21 PROCEDURE — 93798 PHYS/QHP OP CAR RHAB W/ECG: CPT

## 2022-11-21 NOTE — PROGRESS NOTES
Phase II Cardiac Rehab Individualized Treatment Plan-Final    Patient Name: Zoila Smith  Date: 11/21/2022  ACCOUNT #: [de-identified]  Diagnosis: Stable Angina (80% non re-vascularized OM1 branch of the   Cx per cardiac cath 7/7/2022)  Onset Date: 7/7/2022  Referring Physician: Dr. Belen Lozano  Session Number: 39  EXERCISE    Stages of Change:   [] pre-contemplation  [] Action   [] Contemplate    [x] Maintenance   [] Prep   [] Relapse          Exercise Prescription:  Mode: [x] TM   [x] B   [x] STP  [x] R   [x] UBE    Frequency: 3 days per week  Duration: 31-60 minutes  Intensity: 4.5-5.0 METs     THRR: 101-116 bpm  Progression:   Per F.I.T.T. protocol parameters on average 5-10 minutes every 1-2 weeks for the first 4-6 weeks. After 3-4 weeks completed, continue to gradually increase F.I.T.T. parameters gradually at the established duration on average 0.5-1.0 METs per 30 days over the course of the remaining program, as established by patient centered goals and guidelines, maintaining RPE 12-16. [x] Resistance Training  8-15 bilateral upper extremity resistance exercise at 1-3 sets per lift, on 2-3 non-consecutive days using TheraBand/Free Weights/Weight Machine to 8-15 reps on at lease 2 occasions, maintaining RPE 12-16. Once repetition maximum has been achieved, additional weight sets may be added. Hypertension:  [x] Yes  [] No  Resting BP: 122/54  Peak Exercise BP: 162/58  BP Meds: Toprol XL, Lasix, Entresto    Intervention:  Home Exercise:  Current Exercise -    [x] Yes - walks on TM 15 min 1-2 off-rehab days [] No   [] Resistance Training  Progression:  8-12 bilateral upper extremity resistance exercise at 1-3 sets per lift, on 2 non-consecutive days using TheraBand/Free Weights/Weight Machine to 8-15 reps on at lease 2 occasions. Once repetition maximum has been achieved, additional weight sets may be added.     Education:   [x] Equipment [place]  [x] Understanding BP   [x] S/S to report  [x] Sodium [x] Warm up/ Cool down  [x] Exercise Prescription   [x] RPE Scale   [x] Hot/Cold weather guidelines   [x] Ex Safety   [x] Home Exercise     [] Proper use weights/bands        Target Goal:   -Individual Exercise Plan  - Resting BP <130/80 mmHg  -Aerobic active 30 + minutes 5-7 days per week    Nutrition    Stages of Change:   [] pre-contemplation  [] Action   [] Contemplate    [x] Maintenance   [] Prep    [] Relapse    Hyperlipidemia:  [x] Yes  [] No  Lab Results   Component Value Date    CHOL 114 01/28/2019    CHOL 135 09/22/2016    CHOL 122 05/18/2015     Lab Results   Component Value Date    TRIG 96 01/28/2019    TRIG 110 09/22/2016    TRIG 129 05/18/2015     Lab Results   Component Value Date    HDL 28 (L) 07/12/2022    HDL 37 (L) 07/09/2021    HDL 36 (L) 07/01/2020     Lab Results   Component Value Date    LDLCHOLESTEROL 58 07/12/2022    LDLCHOLESTEROL 66 07/09/2021    LDLCHOLESTEROL 62 07/01/2020     Lab Results   Component Value Date    VLDL NOT REPORTED 07/09/2021    VLDL NOT REPORTED 07/01/2020    VLDL NOT REPORTED 01/28/2019     Lab Results   Component Value Date    CHOLHDLRATIO 4.0 07/12/2022    CHOLHDLRATIO 3.2 07/09/2021    CHOLHDLRATIO 3.4 07/01/2020   Lipid Medications: Crestor    Diabetes:  [x] Yes  [] No  FBS: 67 mg/dL (11/21/22)  HbA1c: 6.7% (7/12/22)  [x] Monitor BS at home   Frequency?: Daily   Diabetes Medication: Glipizide-metforminm, Insulin Glargine    Weight Management:  Height: 70 inches  Weight: 176 lb  BMI: 25.3  Wt Goal: <170 lb  Alcohol:   Social History     Substance and Sexual Activity   Alcohol Use Never     Diet Assessment Tool:   [x]  Food Diary  [x]  Rate My Plate Score:   53  Special Diet:  2 gram NA+, 30% total fat, <10% saturated fat, 25-35 grams fiber, reduced cholesterol, balanced nutrition    Intervention:   [] Dietitian Consult - declined      [] Referred to Diabetes Education     Education:  [x] Heart Healthy Nutrition  [x] Weight Management  [x] Portions  [x] Fat/Cholesterol  [x] Food Labels       [x] Food Packaging Claims  [x] Snacks and Substitutes      [x] Relate Diabetes/CAD    Target Goal:  -LDL-C<100 if triglycerides are > 200  -LDL-C < 70 for high risk patients  -HbA1c < 7%  -BMI < 25     Education    Stages of Change:    [] pre-contemplation  [] Action   [] Contemplate    [x] Maintenance   [] Prep    [] Relapse    Learning Barriers:   [] Speech   [] Cognitive   [] Literacy   [] Vision   [] Hearing    [] Readiness to Learn   [x] None    Knowledge test score: 86%      Family support: [x] Yes  [] No    Tobacco use:   Social History     Tobacco Use   Smoking Status Never   Smokeless Tobacco Never   Current smokers:  Longest quit attempt:  Tobacco triggers:  Barriers to successful cessation:  [] Smoking cessation medication:    Intervention:  [] Referred to physician for smoking cessation  [] Individual education and counseling  [] Tobacco adjunct      Education:   [x] Cardiac A and P  [x] CAD/Interventions  [x] Risk Factors     [x] Angina      [x] Medications  [x] CHF          Target Goal:  -Complete cessation of tobacco use (if applicable)  -Continued risk factor modifications  -Recognizing signs/symptoms to report  -Proper use of meds    Psychosocial  Stages of Change:    [] pre-contemplation  [] Action   [] Contemplate    [x] Maintenance   [] Prep    [] Relapse    Psychosocial Test:  Tool Used:   Bin Woody Quality of Life Score: 25/23/27/27/27  PHQ9 score: 1    Intervention:   [] Psych Consult/   [] Physician Referral     Medications:     Education:    [] Stress Management   [] Depression    Target Goal:  -Assess presence or absence of depression using a valid screening tool. -Maximize coping skills.  -Positive support system.     Preventative Medication:    [x] Aspirin       [x] Beta Blockade      [x] Statin or other lipid lowering agent    [x] Antiplatelet   [x] ACE Inhibitor/ARB   [] Anticoagulant   Medication Compliance (stated):    [x] 100%  [] 75%           [] 50%  [] 25%      [] 0%         Target Goal:  -100% medication adherence    Risk Stratification (of untoward event during exercise):  [] HIGH RISK  LVEF < 40%  Survivor of cardiac arrest or sudden cardiac death  Complex ventricular arrythmias (VT >6 beats, multifocal PVCs at rest or with exercise)  Presence of angina or other significant symptoms (shortness of breath, light-headedness, or dizziness at <5 METs or during recovery  MI or cardiac surgery complicated by cardiogenic shock, CHF, and/or s/s of post-procedure ischemia  Abnormal hemodynamics with exercise, especially flat or decreasing systolic BP or chronotropic incompetence with ^ workload  Significant silent ischemia (ST depression >/= 2mm without symptoms with exercise or in recovery  Signs/symptoms including angina, dizziness, light-headedness or dyspnea with low exercise levels or in recovery  Clinically significant depression   Physically Inactive - <= 5 METs  Implantable cardioverter defibrillator (ICD)  At least 4 of below:   Lipids LDL >130 / Cholesterol > 200  BP >160/100  BMI >30  Smoker who continues to smoke  DM - HgbA1C >=7  [x] MODERATE RISK  LVEF 40-49%  Mild to moderate level of silent ischemia during exercise testing or recovery (ST depression <2mm from baseline)  Presence of stable angina or other significant symptoms (unusual shortness of breath, light-headedness, or dizziness occurring only at high level of exertion [>=7 METs])  Functional capacity 5.1-8.9 METs  At least 2-3 of below:  Lipids -129 / Cholesterol 111-268  BP - systolic 674-791/[id number] 64-95  BMI >26  DM - HgbA1C 5.8-7.0  [] LOW RISK  Rest LVEF >= 50%  No resting or exercise induced complex dysrhythmias  Uncomplicate MI, CABG, angioplasty, atherectomy, or stent  Normal hemodynamic and EGC response with exercise and in recovery (appropriate increases and decreases in HR and SBP with increasing workloads and recovery  Functional capacity >9 METs  Absence of angina or other significant symptoms (unusual shortness of breath, light-headedness, or dizziness during exercise and recovery)  Absence of complicated ventricular arrhythmias at rest  Absence of HF  Absence of signs/symptoms of post-event or post-procedure ischemia  Absence of clinical depression   Non-smoker   0-1 Uncontrolled risk factors    Fall Risk Assessment:  History of falls with or without injury  [] Yes   [x] No   Use of ambulatory aid  [] Yes  [x] No   Difficulty walking/impaired gait  [] Yes  [x] No   Numbness in feet  [] Yes   [x] No   Vision changes  [] Yes  [x] No   Dizziness  [] Yes  [x] No   Shortness of breath  [] Yes  [x] No   Medications  [x] Anticoagulant/Antiplatelet  [x] Betablocker /ACE/ARB  [] Antidepressant  [] Seizure medication   [] NA  Risk of fall  [] Low (none of above)  [x] Medium (less than 3 above)  [] High (3 or more above)    Patient 90-Day Goals: (Specific, Measurable, Achievable, Relevant, and Time-Bound)  \"Increase more strength in legs without being tired within the next 90 days. \"  Goal achieved - increased FC 25% over the program.    Program Goals:   Achieve and progress prescribed exercise frequency, intensity, time, and type based upon initial evaluation and submaximal graded exercise results as evidenced by the attaining and maintaining the prescribed target heart rate range, a Yumiko rating of perceived exertion between 11 and 16, duration of 31 - 60 minutes using multiple exercise modes for at least 3 days/week, progressing at least 0.5-1.0 METs/week as tolerated, as evidenced by daily session reports and pre/post MET levels.  Increased FC 25% over course of program.    Introduce and progress 8-10 different bilateral UE and/or LE progressive resistance exercises focused on major muscle groups using 1-3 sets each per lift, on 2-3 non-consecutive days implementing free and machine weights and/or bands, as appropriate, with a resistance of 40-60% 1-repetition maximum or 10 -15 repetitions to progressive overload and increasing resistance once repetitions have progressed to 15 reps and feel fairly light on 2 prior occasions. Instructed and performed free weight exercises. Develop regular home aerobic exercise program for 20 - 60 minutes at least 2 non-rehab days per week, excluding 5 - 10 minutes warm-up and cool-down periods, as tracked on home workout log. Walked on TM at home 10-15 min 1-2 times per week. Establish and maintain individualized heart healthy eating plan, and gradually lose 10-15 lb of body weight over the program, utilizing dietician recommendations, including:  Limit sodium to less than 2,000 mg daily (hidden sodium and added salt combined). Increase fruit to 3 servings every day (fresh, frozen, or canned in own juice or lite syrup). Add a minimum of 2 servings of non-starchy vegetables to your meals daily (carrots, green beans, salad, etc.). Add additional sources of whole grains to your diet by eating old fashioned oatmeal to breakfast, whole wheat pasta to lunch, and brown rice at dinner. Drink 48 oz water every day. As well as moderating nutrional intake, and performing regular aerobic and strength training exercises as prescribed, as evidenced by pre- and post-program nutriton survey and routine rounding with patient, food diary, and Rate-your-Plate screening survey. Lost 1.8 pounds over course of program.      Strive for blood lipid optimization with an LDL-C of <100 mg/dL or LDL 70 mg/dL, an HDL-C of > or = 40 mg/dL for men and > or = 50 mg/dL for women, and a triglyceride level of <150 mg/dL via lifestyle education, behavioral modification, and medication compliance, as evidenced by improved post-program lipid results. Lipids not repeated at program completion. Strive for HbA1C <= 7.0% via lifestyle education, behavioral modification, and medication compliance as evidenced by post program HbA1C lab results.  HbA1C not repeated at program completion. Achieve and maintain an optimal average resting blood pressure of <130 / 80 mmHg over the course of the program by educating patient, monitoring medication compliance, introducing and maintaining regular cardiovascular exercise program as evidenced by routine BP monitoring. Resting -120s and resting DBP 50-80s over program.    Minimize self-reported psycho-social feelings of stress over the program by educating patient, monitoring medication compliance, introducing and maintaining regular cardiovascular exercise as evidenced by pre- and post- surveys and by routine rounding with patient. Improved PHQ9 and QOL scores at program completion. Demonstrate knowledge about risk factor reduction, lifestyle modification, and heart health strategies with an increase of >=5% accuracy via pre- and post-program education assessment screening tool. Participated in all education classes offered - scored 86% on final education assessment. Complete abstinence from tobacco products over the cardiac rehab program through intensive counseling, behavior modification, and medication compliance as evidenced by routine rounding with patient.   Remained tobacco free over the program.    Electronically signed by Bobby Trammell RN on 11/21/22 at 12:16 PM EST

## 2022-12-12 DIAGNOSIS — I48.0 PAF (PAROXYSMAL ATRIAL FIBRILLATION) (HCC): ICD-10-CM

## 2022-12-13 RX ORDER — APIXABAN 5 MG/1
TABLET, FILM COATED ORAL
Qty: 180 TABLET | Refills: 3 | Status: SHIPPED | OUTPATIENT
Start: 2022-12-13

## 2023-01-11 ENCOUNTER — HOSPITAL ENCOUNTER (OUTPATIENT)
Age: 88
Discharge: HOME | End: 2023-01-11
Payer: MEDICARE

## 2023-01-11 DIAGNOSIS — E11.42 TYPE 2 DIABETES MELLITUS WITH DIABETIC POLYNEUROPATHY, WITHOUT LONG-TERM CURRENT USE OF INSULIN (HCC): ICD-10-CM

## 2023-01-11 LAB
ESTIMATED AVERAGE GLUCOSE: 143 MG/DL
HBA1C MFR BLD: 6.6 % (ref 4–6)

## 2023-01-11 PROCEDURE — 36415 COLL VENOUS BLD VENIPUNCTURE: CPT

## 2023-01-11 PROCEDURE — 83036 HEMOGLOBIN GLYCOSYLATED A1C: CPT

## 2023-01-23 DIAGNOSIS — I48.0 PAF (PAROXYSMAL ATRIAL FIBRILLATION) (HCC): ICD-10-CM

## 2023-01-23 DIAGNOSIS — I50.43 ACUTE ON CHRONIC COMBINED SYSTOLIC AND DIASTOLIC CHF, NYHA CLASS 4 (HCC): ICD-10-CM

## 2023-01-23 DIAGNOSIS — R94.39 ABNORMAL STRESS TEST: ICD-10-CM

## 2023-01-23 RX ORDER — AMIODARONE HYDROCHLORIDE 200 MG/1
200 TABLET ORAL DAILY
Qty: 90 TABLET | Refills: 3 | Status: SHIPPED | OUTPATIENT
Start: 2023-01-23

## 2023-01-27 DIAGNOSIS — Z79.01 CHRONIC ANTICOAGULATION: ICD-10-CM

## 2023-01-27 DIAGNOSIS — I48.0 PAF (PAROXYSMAL ATRIAL FIBRILLATION) (HCC): ICD-10-CM

## 2023-01-27 DIAGNOSIS — I10 PRIMARY HYPERTENSION: ICD-10-CM

## 2023-01-27 DIAGNOSIS — I25.10 ASHD (ARTERIOSCLEROTIC HEART DISEASE): ICD-10-CM

## 2023-01-27 DIAGNOSIS — I50.43 ACUTE ON CHRONIC COMBINED SYSTOLIC AND DIASTOLIC CONGESTIVE HEART FAILURE (HCC): ICD-10-CM

## 2023-01-27 DIAGNOSIS — Z79.899 ON AMIODARONE THERAPY: ICD-10-CM

## 2023-02-22 ENCOUNTER — HOSPITAL ENCOUNTER (OUTPATIENT)
Dept: NON INVASIVE DIAGNOSTICS | Age: 88
Discharge: HOME | End: 2023-02-22
Payer: MEDICARE

## 2023-02-22 ENCOUNTER — OFFICE VISIT (OUTPATIENT)
Dept: CARDIOLOGY | Age: 88
End: 2023-02-22
Payer: MEDICARE

## 2023-02-22 VITALS
BODY MASS INDEX: 25.74 KG/M2 | DIASTOLIC BLOOD PRESSURE: 69 MMHG | OXYGEN SATURATION: 96 % | HEART RATE: 54 BPM | HEIGHT: 70 IN | RESPIRATION RATE: 16 BRPM | WEIGHT: 179.8 LBS | SYSTOLIC BLOOD PRESSURE: 142 MMHG

## 2023-02-22 DIAGNOSIS — E78.2 MIXED HYPERLIPIDEMIA: ICD-10-CM

## 2023-02-22 DIAGNOSIS — I10 PRIMARY HYPERTENSION: ICD-10-CM

## 2023-02-22 DIAGNOSIS — I48.0 PAF (PAROXYSMAL ATRIAL FIBRILLATION) (HCC): ICD-10-CM

## 2023-02-22 DIAGNOSIS — Z79.01 CHRONIC ANTICOAGULATION: ICD-10-CM

## 2023-02-22 DIAGNOSIS — Z79.01 ENCOUNTER FOR CURRENT LONG-TERM USE OF ANTICOAGULANTS: ICD-10-CM

## 2023-02-22 DIAGNOSIS — I20.9 ANGINA, CLASS III (HCC): ICD-10-CM

## 2023-02-22 DIAGNOSIS — R00.1 SYMPTOMATIC SINUS BRADYCARDIA: Primary | ICD-10-CM

## 2023-02-22 DIAGNOSIS — I25.10 ASHD (ARTERIOSCLEROTIC HEART DISEASE): ICD-10-CM

## 2023-02-22 DIAGNOSIS — Z79.899 ON AMIODARONE THERAPY: ICD-10-CM

## 2023-02-22 DIAGNOSIS — I50.42 CHRONIC COMBINED SYSTOLIC AND DIASTOLIC HEART FAILURE, NYHA CLASS 3 (HCC): Chronic | ICD-10-CM

## 2023-02-22 DIAGNOSIS — I95.1 ORTHOSTATIC HYPOTENSION: ICD-10-CM

## 2023-02-22 DIAGNOSIS — I50.43 ACUTE ON CHRONIC COMBINED SYSTOLIC AND DIASTOLIC CONGESTIVE HEART FAILURE (HCC): ICD-10-CM

## 2023-02-22 DIAGNOSIS — R29.898 WEAKNESS OF BOTH LOWER EXTREMITIES: ICD-10-CM

## 2023-02-22 DIAGNOSIS — R00.1 BRADYCARDIA: ICD-10-CM

## 2023-02-22 PROCEDURE — 93005 ELECTROCARDIOGRAM TRACING: CPT | Performed by: FAMILY MEDICINE

## 2023-02-22 PROCEDURE — 93242 EXT ECG>48HR<7D RECORDING: CPT

## 2023-02-22 PROCEDURE — 93243 EXT ECG>48HR<7D SCAN A/R: CPT

## 2023-02-22 RX ORDER — AMIODARONE HYDROCHLORIDE 200 MG/1
200 TABLET ORAL DAILY
Qty: 90 TABLET | Refills: 3 | Status: SHIPPED | OUTPATIENT
Start: 2023-02-22

## 2023-02-22 RX ORDER — FUROSEMIDE 20 MG/1
10 TABLET ORAL 2 TIMES DAILY
Qty: 90 TABLET | Refills: 3 | Status: SHIPPED | OUTPATIENT
Start: 2023-02-22

## 2023-02-22 RX ORDER — METOPROLOL SUCCINATE 25 MG/1
25 TABLET, EXTENDED RELEASE ORAL DAILY
Qty: 90 TABLET | Refills: 3 | Status: SHIPPED | OUTPATIENT
Start: 2023-02-22

## 2023-02-22 NOTE — PROGRESS NOTES
Zaid Owen am scribing for and in the presence of Maris Tse MD, MS, F.A.C.C..      Patient: Rebecca Nunez  : 1933  Primary Care Physician: Rola Castanon  Today's Date: 2023    REASON FOR CONSULTATION: Follow-up (Hx:CHF,PAF,HTN,HLD. He has been having come leg weakness. Lightheaded/dizziness once or twice, related to his sugar dropping./Denies: CP, SOB, Palpitations. )    Dear Dr Jeanie Green,     HPI:  Mr. Zaynab Parkinson is a 80 y.o. male who was admitted to the hospital for new onset atrial fibrillation. He has never had this in the past. History of 3 vessel bypass in 2010. No heart attacks. No history of weakened heart in the past. He reports following in the past with Dr. Chidi Urbano in [facility] but has not seen him in over 3 years. He reports it has been awhile since he's seen them. He had echo done on 2021 which shows EF 50-55%  Mild aortic stenosis with peak and mean gradient of 17 and 10 mmHg although it looks worse on 2D imaging. Mitral annular calcification is seen with a mean gradient of 4.2 mmHg consistent with mild calcific mitral stenosis. Mild to moderate mitral regurgitation was seen. Moderate to severe tricuspid regurgitation. Moderate pulmonary hypertension with an estimated right ventricular systolic pressure of 57 mmHg. Diastolic function cannot be properly assessed due to atrial fibrillation. He also had abnormal stress on 2021 which was Equivocal study and EF 37%. He had successful cardioversion on 2021. Echocardiogram done on 2022: EF of 55% LV wall thickness is mildly increased. Severe bi-atrial enlargement. Mild to moderate mitral regurgitation. Moderate to severe tricuspid regurgitation. Moderate pulmonary hypertension with an estimated right ventricular systolic pressure of 61 mmHg. Moderate pulmonic regurgitation. Moderate diastolic dysfunction is seen.  He had heart cath done on 2022  Severe four vessel disease involving the left main, LAD, circumflex and right coronary arteries including a non re-vascularized OM1 branch of the Cx. 3 of 3 bypass grafts patent. Normal LVEDP. He also had stress test exercise only 7/8/2022 he reached 76% of the peak predicted heart rate with a maximum workload of 6.1 METS. Since I last saw Mr. Bhakti Montez he reports she has been doing well but does complain of some leg weakness. He has had lightheaded/dizziness once or twice and blames this on his blood sugar dropping. He is satisfied with what he is able to do with being how old he his. He denied any current or recent chest pain, pressure or tightness. He denies having any shortness of breath or palpitations. No abdominal pain, bleeding problems, bowel issues, problems with his medications or any other concerns at this time. No cough, fever or chills. No nausea or vomiting. Wt Readings from Last 3 Encounters:   02/22/23 179 lb 12.8 oz (81.6 kg)   01/25/23 176 lb 8 oz (80.1 kg)   10/17/22 177 lb 8 oz (80.5 kg)      Exercise Tolerance: Mr. Bhakti Montez reports that he has a fairly poor exercise tolerance. His says that he could walk a quarter of a mile. Leg weakness is what would limit him.     Past Medical History:   Diagnosis Date    ACE inhibitor-aggravated angioedema 2012    Atrial fibrillation ([facility] [address] 12/02/2021    CAD (coronary artery disease)     CHF (congestive heart failure) (HCC)     Enlarged prostate     normal cystoscopy 7/2008 Dr. Kelsie Licona hypertension     History of blood transfusion     History of Holter monitoring 2015    symptomatic PACs    Hx of echocardiogram 2015    normal    Hyperlipidemia     Osteoarthritis     Type 2 diabetes mellitus with diabetic polyneuropathy, without long-term current use of insulin (HCC)     Type II or unspecified type diabetes mellitus without mention of complication, not stated as uncontrolled      CURRENT ALLERGIES: Altace [ramipril], Diclofenac sodium, Erythromycin, Naprosyn [naproxen], and Vesicare [solifenacin] REVIEW OF SYSTEMS: 14 systems were reviewed. Pertinent positives and negatives as above, all else negative. Past Surgical History:   Procedure Laterality Date    BELPHAROPTOSIS REPAIR Bilateral 1/9/2015    CARDIAC CATHETERIZATION Left 07/07/2022    left radial/MHT/ Dr. Agee Towson TEST  4/2010    Abnormal    COLONOSCOPY  11/2009    Normal    CORONARY ARTERY BYPASS GRAFT  2010    CYSTOURETHROSCOPY  7/10/08    CYSTOURETHROSCOPY  10/92    EYE SURGERY  09/2013    bilateral cataracts    EYE SURGERY Bilateral 01/09/2015    FRACTURE SURGERY  1989    Right arm/leg, pelvis, Left ankle, dislocated hip     PROSTATE SURGERY  10/92    biopsy    Social History:  Social History     Tobacco Use    Smoking status: Never    Smokeless tobacco: Never   Vaping Use    Vaping Use: Never used   Substance Use Topics    Alcohol use: Never    Drug use: No        CURRENT MEDICATIONS:  Outpatient Medications Marked as Taking for the 2/22/23 encounter (Office Visit) with Luke Hopson MD   Medication Sig Dispense Refill    blood glucose test strips (ACCU-CHEK LALO PLUS) strip 1 each by Does not apply route daily 50 strip 11    sacubitril-valsartan (ENTRESTO) 24-26 MG per tablet Take 1 tablet by mouth 2 times daily 180 tablet 3    amiodarone (CORDARONE) 200 MG tablet Take 1 tablet by mouth daily 90 tablet 3    rosuvastatin (CRESTOR) 20 MG tablet Take 1 tablet by mouth daily 90 tablet 3    celecoxib (CELEBREX) 200 MG capsule Take 1 capsule by mouth daily 90 capsule 3    Insulin Glargine, 2 Unit Dial, (TOUJEO MAX SOLOSTAR) 300 UNIT/ML SOPN Inject 18 Units into the skin daily (Patient taking differently: Inject 18 Units into the skin daily Takes 16 - 18 units. Varies) 10 Adjustable Dose Pre-filled Pen Syringe 3    ELIQUIS 5 MG TABS tablet TAKE 1 TABLET BY MOUTH TWICE A  tablet 3    gabapentin (NEURONTIN) 600 MG tablet Take 1 tablet by mouth 2 times daily for 90 days.  180 tablet 3    oxybutynin (DITROPAN) 5 MG tablet Take 1 tablet by mouth in the morning and 1 tablet before bedtime. 180 tablet 3    glipiZIDE-metFORMIN (METAGLIP) 2.5-500 MG per tablet Take 1 tablet by mouth in the morning and 1 tablet at noon and 1 tablet before bedtime. 270 tablet 3    metoprolol succinate (TOPROL XL) 25 MG extended release tablet Take 1 tablet by mouth daily 90 tablet 3    furosemide (LASIX) 20 MG tablet Take 0.5 tablets by mouth 2 times daily 90 tablet 3    Handicap Placard MISC by Does not apply route Duration; 5 years 1 each 0    Multiple Vitamins-Minerals (VISION FORMULA PO) Take 1 tablet by mouth daily      aspirin 81 MG EC tablet Take 81 mg by mouth daily. FAMILY HISTORY: family history includes Diabetes in his sister; Heart Disease in his father. PHYSICAL EXAM:   BP (!) 150/70 (Site: Left Upper Arm, Position: Sitting, Cuff Size: Medium Adult)   Pulse 54   Resp 16   Ht 5' 10\" (1.778 m)   Wt 179 lb 12.8 oz (81.6 kg)   SpO2 96%   BMI 25.80 kg/m²  Body mass index is 25.8 kg/m². Constitutional: He is oriented to person, place, and time. He appears well-developed and well-nourished. In no acute distress. HEENT: Normocephalic and atraumatic. No JVD present. Carotid bruit is not present. No mass and no thyromegaly present. No lymphadenopathy present. Cardiovascular: Normal rate, regular rhythm, normal heart sounds. Exam reveals no gallop and no friction rubs. 4/6 systolic murmur, 5th intercostal space on the LEFT in the mid-clavicular line (cardiac apex). Pulmonary/Chest: Effort normal and breath sounds normal. No respiratory distress. He has no wheezes, rhonchi or rales. Abdominal: Soft, non-tender. Bowel sounds and aorta are normal. He exhibits no organomegaly, mass or bruit. Extremities: Trace at the ankles. No cyanosis or clubbing. 2+ radial and carotid pulses. Distal extremity pulses: 1+ bilaterally. Neurological: He is alert and oriented to person, place, and time.  No evidence of gross cranial nerve deficit. Coordination appeared normal.   Skin: Skin is warm and dry. There is no rash or diaphoresis. Varicose veins present. Psychiatric: He has a normal mood and affect. His speech is normal and behavior is normal.      MOST RECENT LABS ON RECORD:   Lab Results   Component Value Date    WBC 6.3 06/29/2022    HGB 12.6 (L) 06/29/2022    HCT 40.6 (L) 06/29/2022    PLT See Reflexed IPF Result 06/29/2022    CHOL 114 01/28/2019    TRIG 96 01/28/2019    HDL 28 (L) 07/12/2022    ALT 41 12/03/2021    AST 19 12/03/2021     07/12/2022    K 5.1 07/12/2022    CL 99 07/12/2022    CREATININE 0.99 07/12/2022    BUN 22 07/12/2022    CO2 30 07/12/2022    TSH 2.62 12/02/2021    PSA 0.91 12/24/2012    GLUF 75 07/12/2022    LABA1C 6.6 (H) 01/11/2023    LABMICR 19 (H) 07/12/2022     ASSESSMENT:     Diagnosis Orders   1. Symptomatic sinus bradycardia        2. PAF (paroxysmal atrial fibrillation) ([facility])  EKG 12 lead    furosemide (LASIX) 20 MG tablet    sacubitril-valsartan (ENTRESTO) 24-26 MG per tablet    apixaban (ELIQUIS) 5 MG TABS tablet    metoprolol succinate (TOPROL XL) 25 MG extended release tablet    amiodarone (CORDARONE) 200 MG tablet    Continuous cardiac monitoring, >2 up to 14 days      3. Mixed hyperlipidemia  EKG 12 lead    furosemide (LASIX) 20 MG tablet    sacubitril-valsartan (ENTRESTO) 24-26 MG per tablet    apixaban (ELIQUIS) 5 MG TABS tablet    metoprolol succinate (TOPROL XL) 25 MG extended release tablet    amiodarone (CORDARONE) 200 MG tablet    Continuous cardiac monitoring, >2 up to 14 days      4. Weakness of both lower extremities  EKG 12 lead    furosemide (LASIX) 20 MG tablet    sacubitril-valsartan (ENTRESTO) 24-26 MG per tablet    apixaban (ELIQUIS) 5 MG TABS tablet    metoprolol succinate (TOPROL XL) 25 MG extended release tablet    amiodarone (CORDARONE) 200 MG tablet    Continuous cardiac monitoring, >2 up to 14 days      5.  On amiodarone therapy  EKG 12 lead    furosemide (LASIX) 20 MG tablet sacubitril-valsartan (ENTRESTO) 24-26 MG per tablet    apixaban (ELIQUIS) 5 MG TABS tablet    metoprolol succinate (TOPROL XL) 25 MG extended release tablet    amiodarone (CORDARONE) 200 MG tablet    Continuous cardiac monitoring, >2 up to 14 days      6. Chronic anticoagulation  EKG 12 lead    furosemide (LASIX) 20 MG tablet    sacubitril-valsartan (ENTRESTO) 24-26 MG per tablet    apixaban (ELIQUIS) 5 MG TABS tablet    metoprolol succinate (TOPROL XL) 25 MG extended release tablet    amiodarone (CORDARONE) 200 MG tablet    Continuous cardiac monitoring, >2 up to 14 days      7. Primary hypertension  EKG 12 lead    furosemide (LASIX) 20 MG tablet    sacubitril-valsartan (ENTRESTO) 24-26 MG per tablet    apixaban (ELIQUIS) 5 MG TABS tablet    metoprolol succinate (TOPROL XL) 25 MG extended release tablet    amiodarone (CORDARONE) 200 MG tablet    Continuous cardiac monitoring, >2 up to 14 days      8. Orthostatic hypotension  EKG 12 lead    furosemide (LASIX) 20 MG tablet    sacubitril-valsartan (ENTRESTO) 24-26 MG per tablet    apixaban (ELIQUIS) 5 MG TABS tablet    metoprolol succinate (TOPROL XL) 25 MG extended release tablet    amiodarone (CORDARONE) 200 MG tablet    Continuous cardiac monitoring, >2 up to 14 days      9. ASHD (arteriosclerotic heart disease)  EKG 12 lead    furosemide (LASIX) 20 MG tablet    sacubitril-valsartan (ENTRESTO) 24-26 MG per tablet    apixaban (ELIQUIS) 5 MG TABS tablet    metoprolol succinate (TOPROL XL) 25 MG extended release tablet    amiodarone (CORDARONE) 200 MG tablet    Continuous cardiac monitoring, >2 up to 14 days      10. Angina, class III ([address]        11. Encounter for current long-term use of anticoagulants        12. Chronic combined systolic and diastolic heart failure, NYHA class 3 ([facility])          PLAN:    Hyperlipidemia: Mixed - Last LDL on 7/12/2022 was 58 mg/dL   Cholesterol Reduction Therapy: Continue rosuvastatin (Crestor) 20 mg daily.       Bradycardia: Possibly symptomatic with likely at least some degree of chronotropic incompetence. Patient says he never sees his HR>60 bpm  Beta Blocker: Continue Metoprolol succinate (Toprol XL) 25 mg daily. He is on this as a Class I indication related to his coronary artery disease and history of angina. Additional Testing List: I ordered an EVENT MONITOR to try and pinpoint the etiology of their symptoms    Leg Weakness/Diminished Pedal Pulses  He did complete cardiac rehab   Continue to monitor     Paroxysmal Atrial Fibrillation: Rate Control Symptomatic. Beta Blocker: Continue Metoprolol succinate (Toprol XL) 25 mg daily. Anti-Arrhythmic: Continue amiodarone (Pacerone) 200 mg daily. Monitoring: Since they will being maintained on Amiodarone, I told them that we will need to closely monitor them for potential side effects. These include monitoring LFTs and TSH at least every 6 months as well as chest x-rays, pulmonary function tests, and eye exams at least yearly. [id number] Score for Atrial Fibrillation Stroke Risk    Risk   Factors  Component Value   C CHF Yes 1   H HTN Yes 1   A2 Age >= 76 Yes,  (80 y.o.) 2   D DM Yes 1   S2 Prior Stroke/TIA No 0   V Vascular Disease No 0   A Age 74-69 No,  (80 y.o.) 0   Sc Sex male 0    [id number]  Score  5   Score last updated 94/7/74 5:84 PM EST  Click here for a link to the UpToDate guideline \"Atrial Fibrillation: Anticoagulation therapy to prevent embolization  Disclaimer: Risk Score calculation is dependent on accuracy of patient problem list and past encounter diagnosis. Stroke Risk: CHADS2-VASc Score: 5/9 (6.7% stroke risk)  Anticoagulation: Continue Apixaban (Eliquis) 5 mg every 12 hours.   Because of his atrial fibrillation, I also would also recommend that he continue with anticoagulation to decrease his risk of stoke but also reminded him to watch for signs of blood in his stool or black tarry stools and stop the medication immediately if this develops as this could be life threatening. Chronic combined heart failure: New York Heart Association Class: III but probably his baseline: down four pounds since last visit with no signs of fluid overload. Beta Blocker: Continue Metoprolol succinate (Toprol XL) 25 mg daily. ACE Inibitor/ARB: Continue sacubitril/valsartan (Entresto) 24/26 mg twice daily. Diuretics: Continue furosemide (Lasix) 10 mg twice daily. Heart failure counseling: I advised them to try and keep their legs up whenever possible and to limit salt in their diet. Essential Hypertension: Controlled  Beta Blocker: Continue Metoprolol succinate (Toprol XL) 25 mg daily. ACE Inibitor/ARB: Continue sacubitril/valsartan (Entresto) 24/26 mg twice daily. Calcium Channel Blocker: Not indicated at this time. Diuretics: Continue furosemide (Lasix) 10 mg twice daily. History of Orthostatic hypotension:  Pharmacologic Therapy: Not indicated at this time. Nonpharmacologic counseling: Because of his condition, I reminded him to try and keep himself well-hydrated and to take extra time when moving from laying to sitting, sitting to standing and standing to walking. I also explained to him to help improve his symptoms he should include 3 g sodium diet, 1 or 2 L of sports drinks daily, knee-high compressions stockings. Atherosclerotic Heart Disease: Known severe coronary artery disease on 7/2022 heart catheterization with plans for guideline maximal medical management and class III angina  Antiplatelet Agent: Continue Aspirin 81 mg daily. Beta Blocker: Continue Metoprolol succinate (Toprol XL) 25 mg daily. Anti-anginal medications: Contraindicated due to history of intolerance/allergy. Cholesterol Reduction Therapy: Continue rosuvastatin (Crestor) 20 mg daily. I discussed the potential benefits of statin therapy as well as the potential risks including myalgia as well as the rare but potentially serious complication of liver or kidney damage. Although rare, I told them that this could be serious and therefore told them to stop the medication immediately and call if they developed any severe muscle aches or pains and they agreed to do so. Additional counseling: I advised them to call our office or go to the emergency room if they developed worsening or persistent chest pain or increased shortness of breath as this could be life threatening. Finally, I recommended that he continue his other medications and follow up with you as previously scheduled. FOLLOW UP:   I told Mr. Lacy Hawkins to call my office if he had any problems, but otherwise told him to Return in about 6 months (around 8/22/2023). However, I would be happy to see him sooner should the need arise. Sincerely,  Cameron Sherman. Pam HART, MS, F.A.C.C. [facility] Specialist     [address] [facility]  Phone: [phone], Fax: [phone]      I believe that the risk of significant morbidity and mortality related to the patient's current medical conditions are: Intermediate. The documentation recorded by the scribe, accurately and completely reflects the services I personally performed and the decisions made by me. Luke Hopson MD, MS, F.A.C.C.  February 22, 2023

## 2023-02-22 NOTE — PATIENT INSTRUCTIONS
SURVEY:    You may be receiving a survey from [employer] regarding your visit today. Please complete the survey to enable us to provide the highest quality of care to you and your family. If you cannot score us a very good on any question, please call the office to discuss how we could have made your experience a very good one. Thank you.

## 2023-03-08 ENCOUNTER — TELEPHONE (OUTPATIENT)
Dept: CARDIOLOGY | Age: 88
End: 2023-03-08

## 2023-03-08 NOTE — TELEPHONE ENCOUNTER
----- Message from Thomas Rea MD sent at 3/8/2023 12:53 AM EST -----  Let Mr. Cynthia Rothman know their test result was ok. Will discuss at next visit. Thanks.

## 2023-04-22 DIAGNOSIS — I25.10 ASHD (ARTERIOSCLEROTIC HEART DISEASE): ICD-10-CM

## 2023-04-22 DIAGNOSIS — Z79.01 CHRONIC ANTICOAGULATION: ICD-10-CM

## 2023-04-22 DIAGNOSIS — I10 PRIMARY HYPERTENSION: ICD-10-CM

## 2023-04-22 DIAGNOSIS — Z79.899 ON AMIODARONE THERAPY: ICD-10-CM

## 2023-04-22 DIAGNOSIS — I95.1 ORTHOSTATIC HYPOTENSION: ICD-10-CM

## 2023-04-22 DIAGNOSIS — R29.898 WEAKNESS OF BOTH LOWER EXTREMITIES: ICD-10-CM

## 2023-04-22 DIAGNOSIS — I48.0 PAF (PAROXYSMAL ATRIAL FIBRILLATION) (HCC): ICD-10-CM

## 2023-04-22 DIAGNOSIS — E78.2 MIXED HYPERLIPIDEMIA: ICD-10-CM

## 2023-04-24 RX ORDER — SACUBITRIL AND VALSARTAN 24; 26 MG/1; MG/1
TABLET, FILM COATED ORAL
Qty: 90 TABLET | Refills: 3 | Status: SHIPPED | OUTPATIENT
Start: 2023-04-24

## 2023-07-21 ENCOUNTER — HOSPITAL ENCOUNTER (OUTPATIENT)
Age: 88
Discharge: HOME | End: 2023-07-21
Payer: MEDICARE

## 2023-07-21 DIAGNOSIS — E11.42 TYPE 2 DIABETES MELLITUS WITH DIABETIC POLYNEUROPATHY, WITHOUT LONG-TERM CURRENT USE OF INSULIN (HCC): ICD-10-CM

## 2023-07-21 DIAGNOSIS — I10 ESSENTIAL HYPERTENSION: ICD-10-CM

## 2023-07-21 DIAGNOSIS — I25.10 ASHD (ARTERIOSCLEROTIC HEART DISEASE): ICD-10-CM

## 2023-07-21 LAB
ALBUMIN SERPL-MCNC: 4.2 G/DL (ref 3.5–5.2)
ALBUMIN/GLOB SERPL: 1.6 {RATIO} (ref 1–2.5)
ALP SERPL-CCNC: 89 U/L (ref 40–129)
ALT SERPL-CCNC: 32 U/L (ref 5–41)
ANION GAP SERPL CALCULATED.3IONS-SCNC: 9 MMOL/L (ref 9–17)
AST SERPL-CCNC: 30 U/L
BILIRUB SERPL-MCNC: 0.7 MG/DL (ref 0.3–1.2)
BUN SERPL-MCNC: 27 MG/DL (ref 8–23)
BUN/CREAT SERPL: 25 (ref 9–20)
CALCIUM SERPL-MCNC: 9.2 MG/DL (ref 8.6–10.4)
CHLORIDE SERPL-SCNC: 103 MMOL/L (ref 98–107)
CHOLEST SERPL-MCNC: 94 MG/DL
CHOLESTEROL/HDL RATIO: 3.2
CO2 SERPL-SCNC: 30 MMOL/L (ref 20–31)
CREAT SERPL-MCNC: 1.1 MG/DL (ref 0.7–1.2)
CREAT UR-MCNC: 87.7 MG/DL (ref 39–259)
EST. AVERAGE GLUCOSE BLD GHB EST-MCNC: 134 MG/DL
GFR SERPL CREATININE-BSD FRML MDRD: >60 ML/MIN/1.73M2
GLUCOSE P FAST SERPL-MCNC: 89 MG/DL (ref 70–99)
HBA1C MFR BLD: 6.3 % (ref 4–6)
HDLC SERPL-MCNC: 29 MG/DL
LDLC SERPL CALC-MCNC: 48 MG/DL (ref 0–130)
MICROALBUMIN UR-MCNC: 26 MG/L
MICROALBUMIN/CREAT UR-RTO: 30 MCG/MG CREAT
POTASSIUM SERPL-SCNC: 4.7 MMOL/L (ref 3.7–5.3)
PROT SERPL-MCNC: 6.9 G/DL (ref 6.4–8.3)
SODIUM SERPL-SCNC: 142 MMOL/L (ref 135–144)
TRIGL SERPL-MCNC: 85 MG/DL

## 2023-07-21 PROCEDURE — 83036 HEMOGLOBIN GLYCOSYLATED A1C: CPT

## 2023-07-21 PROCEDURE — 80053 COMPREHEN METABOLIC PANEL: CPT

## 2023-07-21 PROCEDURE — 82570 ASSAY OF URINE CREATININE: CPT

## 2023-07-21 PROCEDURE — 36415 COLL VENOUS BLD VENIPUNCTURE: CPT

## 2023-07-21 PROCEDURE — 82043 UR ALBUMIN QUANTITATIVE: CPT

## 2023-07-21 PROCEDURE — 80061 LIPID PANEL: CPT

## 2023-08-21 ENCOUNTER — OFFICE VISIT (OUTPATIENT)
Dept: CARDIOLOGY | Age: 88
End: 2023-08-21
Payer: MEDICARE

## 2023-08-21 VITALS
DIASTOLIC BLOOD PRESSURE: 56 MMHG | WEIGHT: 178.2 LBS | RESPIRATION RATE: 18 BRPM | BODY MASS INDEX: 25.51 KG/M2 | OXYGEN SATURATION: 93 % | SYSTOLIC BLOOD PRESSURE: 107 MMHG | HEIGHT: 70 IN | HEART RATE: 55 BPM

## 2023-08-21 DIAGNOSIS — I50.42 CHRONIC COMBINED SYSTOLIC AND DIASTOLIC HEART FAILURE, NYHA CLASS 3 (HCC): Chronic | ICD-10-CM

## 2023-08-21 DIAGNOSIS — Z01.810 PREOP CARDIOVASCULAR EXAM: ICD-10-CM

## 2023-08-21 DIAGNOSIS — Z79.01 CHRONIC ANTICOAGULATION: ICD-10-CM

## 2023-08-21 DIAGNOSIS — R29.898 WEAKNESS OF BOTH LOWER EXTREMITIES: ICD-10-CM

## 2023-08-21 DIAGNOSIS — R00.1 SYMPTOMATIC SINUS BRADYCARDIA: Primary | ICD-10-CM

## 2023-08-21 DIAGNOSIS — I95.1 ORTHOSTATIC HYPOTENSION: ICD-10-CM

## 2023-08-21 DIAGNOSIS — I25.10 ASHD (ARTERIOSCLEROTIC HEART DISEASE): ICD-10-CM

## 2023-08-21 DIAGNOSIS — I48.0 PAF (PAROXYSMAL ATRIAL FIBRILLATION) (HCC): ICD-10-CM

## 2023-08-21 DIAGNOSIS — I10 PRIMARY HYPERTENSION: ICD-10-CM

## 2023-08-21 DIAGNOSIS — Z79.899 ON AMIODARONE THERAPY: ICD-10-CM

## 2023-08-21 DIAGNOSIS — E78.2 MIXED HYPERLIPIDEMIA: ICD-10-CM

## 2023-08-21 PROCEDURE — 1123F ACP DISCUSS/DSCN MKR DOCD: CPT | Performed by: FAMILY MEDICINE

## 2023-08-21 PROCEDURE — 1036F TOBACCO NON-USER: CPT | Performed by: FAMILY MEDICINE

## 2023-08-21 PROCEDURE — G8427 DOCREV CUR MEDS BY ELIG CLIN: HCPCS | Performed by: FAMILY MEDICINE

## 2023-08-21 PROCEDURE — G8417 CALC BMI ABV UP PARAM F/U: HCPCS | Performed by: FAMILY MEDICINE

## 2023-08-21 PROCEDURE — 99214 OFFICE O/P EST MOD 30 MIN: CPT | Performed by: FAMILY MEDICINE

## 2023-08-21 PROCEDURE — 93010 ELECTROCARDIOGRAM REPORT: CPT | Performed by: FAMILY MEDICINE

## 2023-08-21 PROCEDURE — 99211 OFF/OP EST MAY X REQ PHY/QHP: CPT | Performed by: FAMILY MEDICINE

## 2023-08-21 PROCEDURE — 93005 ELECTROCARDIOGRAM TRACING: CPT | Performed by: FAMILY MEDICINE

## 2023-08-21 NOTE — PATIENT INSTRUCTIONS
SURVEY:    You may be receiving a survey from [employer] regarding your visit today. Please complete the survey to enable us to provide the highest quality of care to you and your family. If you cannot score us a very good on any question, please call the office to discuss how we could have made your experience a very good one. Thank you. \

## 2023-08-21 NOTE — PROGRESS NOTES
pains and they agreed to do so. Additional counseling: I advised them to call our office or go to the emergency room if they developed worsening or persistent chest pain or increased shortness of breath as this could be life threatening. Finally, I recommended that he continue his other medications and follow up with you as previously scheduled. FOLLOW UP:   I told Mr. Betty Gonzales to call my office if he had any problems, but otherwise told him to Return in about 6 months (around 2/21/2024). However, I would be happy to see him sooner should the need arise. Sincerely,  Deana Sams. Pam HART, MS, F.A.C.C. [facility] Cardiology Specialist    [address] [place] [address]  Phone: [phone], Fax: [phone]      I believe that the risk of significant morbidity and mortality related to the patient's current medical conditions are: Intermediate. Approximately 30 minutes were spent during prep work, discussion and exam of the patient, and follow up documentation and all of their questions were answered. The documentation recorded by the scribe, accurately and completely reflects the services I personally performed and the decisions made by me. Tyrone Quinteros MD, MS, F.A.C.C.  August 21, 2023

## 2023-09-18 ENCOUNTER — HOSPITAL ENCOUNTER (OUTPATIENT)
Age: 88
Discharge: HOME | End: 2023-09-20
Payer: MEDICARE

## 2023-09-18 ENCOUNTER — TELEPHONE (OUTPATIENT)
Dept: CARDIOLOGY | Age: 88
End: 2023-09-18

## 2023-09-18 DIAGNOSIS — I50.42 CHRONIC COMBINED SYSTOLIC AND DIASTOLIC HEART FAILURE, NYHA CLASS 3 (HCC): ICD-10-CM

## 2023-09-18 DIAGNOSIS — R00.1 SYMPTOMATIC SINUS BRADYCARDIA: ICD-10-CM

## 2023-09-18 DIAGNOSIS — R00.1 SYMPTOMATIC SINUS BRADYCARDIA: Primary | ICD-10-CM

## 2023-09-18 DIAGNOSIS — I48.0 PAF (PAROXYSMAL ATRIAL FIBRILLATION) (HCC): ICD-10-CM

## 2023-09-18 DIAGNOSIS — R00.1 BRADYCARDIA: ICD-10-CM

## 2023-09-18 DIAGNOSIS — Z01.810 PREOP CARDIOVASCULAR EXAM: ICD-10-CM

## 2023-09-18 DIAGNOSIS — R94.39 ABNORMAL STRESS TEST: ICD-10-CM

## 2023-09-18 DIAGNOSIS — I20.9 ANGINA, CLASS III (HCC): ICD-10-CM

## 2023-09-18 LAB
STRESS BASELINE DIAS BP: 70 MMHG
STRESS BASELINE HR: 54 BPM
STRESS BASELINE SYS BP: 136 MMHG
STRESS ESTIMATED WORKLOAD: 6.6 METS
STRESS EXERCISE DUR MIN: 4 MIN
STRESS EXERCISE DUR SEC: 41 SEC
STRESS PEAK DIAS BP: 80 MMHG
STRESS PEAK SYS BP: 162 MMHG
STRESS PERCENT HR ACHIEVED: 78 %
STRESS POST PEAK HR: 102 BPM
STRESS RATE PRESSURE PRODUCT: NORMAL BPM*MMHG
STRESS STAGE 1 BP: NORMAL MMHG
STRESS STAGE 1 DURATION: 3 MIN:SEC
STRESS STAGE 1 HR: 90 BPM
STRESS STAGE RECOVERY 1 BP: NORMAL MMHG
STRESS STAGE RECOVERY 1 DURATION: 0 MIN:SEC
STRESS STAGE RECOVERY 1 HR: 102 BPM
STRESS STAGE RECOVERY 2 DURATION: 1 MIN:SEC
STRESS STAGE RECOVERY 2 HR: 88 BPM
STRESS STAGE RECOVERY 3 BP: NORMAL MMHG
STRESS STAGE RECOVERY 3 DURATION: 3 MIN:SEC
STRESS STAGE RECOVERY 3 HR: 66 BPM
STRESS STAGE RECOVERY 4 BP: NORMAL MMHG
STRESS STAGE RECOVERY 4 DURATION: 5 MIN:SEC
STRESS STAGE RECOVERY 4 HR: 60 BPM
STRESS TARGET HR: 131 BPM

## 2023-09-18 PROCEDURE — 93017 CV STRESS TEST TRACING ONLY: CPT

## 2023-09-18 PROCEDURE — 93016 CV STRESS TEST SUPVJ ONLY: CPT | Performed by: INTERNAL MEDICINE

## 2023-09-18 PROCEDURE — 93018 CV STRESS TEST I&R ONLY: CPT | Performed by: INTERNAL MEDICINE

## 2023-09-18 PROCEDURE — 93243 EXT ECG>48HR<7D SCAN A/R: CPT

## 2023-09-21 ENCOUNTER — TELEPHONE (OUTPATIENT)
Dept: CARDIOLOGY | Age: 88
End: 2023-09-21

## 2023-09-21 NOTE — TELEPHONE ENCOUNTER
----- Message from Alex Cho MD sent at 9/20/2023 11:30 PM EDT -----  Let Patient know treadmill stress test result was actually quite good. Will discuss at next visit and wait for results of event monitor. Thanks.

## 2023-10-02 ENCOUNTER — TELEPHONE (OUTPATIENT)
Dept: CARDIOLOGY | Age: 88
End: 2023-10-02

## 2023-10-02 NOTE — TELEPHONE ENCOUNTER
----- Message from Becca Lux MD sent at 10/1/2023  9:12 PM EDT -----  Let Mr. Pam Park know their test result was ok. Will discuss at next visit. Thanks.

## 2024-01-08 ENCOUNTER — APPOINTMENT (OUTPATIENT)
Dept: GENERAL RADIOLOGY | Age: 89
End: 2024-01-08
Payer: MEDICARE

## 2024-01-08 ENCOUNTER — HOSPITAL ENCOUNTER (EMERGENCY)
Age: 89
Discharge: HOME | End: 2024-01-08
Payer: MEDICARE

## 2024-01-08 VITALS
TEMPERATURE: 97.8 F | OXYGEN SATURATION: 95 % | RESPIRATION RATE: 16 BRPM | HEART RATE: 67 BPM | SYSTOLIC BLOOD PRESSURE: 123 MMHG | DIASTOLIC BLOOD PRESSURE: 61 MMHG

## 2024-01-08 DIAGNOSIS — M43.17 ANTEROLISTHESIS OF LUMBOSACRAL SPINE: Primary | ICD-10-CM

## 2024-01-08 DIAGNOSIS — S32.010A CLOSED COMPRESSION FRACTURE OF BODY OF L1 VERTEBRA (HCC): ICD-10-CM

## 2024-01-08 DIAGNOSIS — M51.36 DDD (DEGENERATIVE DISC DISEASE), LUMBAR: ICD-10-CM

## 2024-01-08 PROCEDURE — 73502 X-RAY EXAM HIP UNI 2-3 VIEWS: CPT

## 2024-01-08 PROCEDURE — 72100 X-RAY EXAM L-S SPINE 2/3 VWS: CPT

## 2024-01-08 PROCEDURE — 99283 EMERGENCY DEPT VISIT LOW MDM: CPT

## 2024-01-08 ASSESSMENT — PAIN - FUNCTIONAL ASSESSMENT: PAIN_FUNCTIONAL_ASSESSMENT: 0-10

## 2024-01-08 ASSESSMENT — PAIN SCALES - GENERAL: PAINLEVEL_OUTOF10: 5

## 2024-01-08 ASSESSMENT — PAIN DESCRIPTION - ORIENTATION: ORIENTATION: RIGHT

## 2024-01-08 ASSESSMENT — PAIN DESCRIPTION - LOCATION: LOCATION: HIP

## 2024-01-08 NOTE — ED PROVIDER NOTES
[facility] ED  EMERGENCY DEPARTMENT ENCOUNTER      Pt Name: Jordan Nicholson  MRN: [id number]  Birthdate 9/28/1933  Date of evaluation: 1/8/2024  Provider: SAGAR Turner CNP  8:36 PM    CHIEF COMPLAINT       Chief Complaint   Patient presents with    Hip Pain     Right side. Started this morning, no known injury to area.          HISTORY OF PRESENT ILLNESS        Jordan Nicholson 89 yo male presents to ED with c/o right hip pain since this earlier today after bending over sink to p.o. squash.  Patient does have a total hip replacement on the right.  No known trauma.  Patient and daughter were concerned that patient may have had a stroke since he was unable to stand up straight and was leaning towards the right after peeling this course.  Patient is alert and oriented.  Patient has no speech deficits.  No previous history of stroke.    The history is provided by the patient. No  was used.       Nursing Notes were reviewed.    REVIEW OF SYSTEMS       Review of Systems   Musculoskeletal:  Positive for arthralgias, gait problem and myalgias.        Right lateral hip   All other systems reviewed and are negative.      Except as noted above the remainder of the review of systems was reviewed and negative.       PAST MEDICAL HISTORY     Past Medical History:   Diagnosis Date    ACE inhibitor-aggravated angioedema 2012    Atrial fibrillation (HCC) 12/02/2021    CAD (coronary artery disease)     CHF (congestive heart failure) (HCC)     Enlarged prostate     normal cystoscopy 7/2008 Dr. Dillard    Essential hypertension     History of blood transfusion     History of Holter monitoring 2015    symptomatic PACs    Hx of echocardiogram 2015    normal    Hyperlipidemia     Osteoarthritis     Type 2 diabetes mellitus with diabetic polyneuropathy, without long-term current use of insulin (HCC)     Type II or unspecified type diabetes mellitus without mention of complication, not stated as

## 2024-01-08 NOTE — DISCHARGE INSTRUCTIONS
Ice/heat lumbar spine  ARNICARE GEL OTC([employer]) 2-3 times daily to lower back with ice.   Exercises as listed.

## 2024-01-17 ENCOUNTER — HOSPITAL ENCOUNTER (OUTPATIENT)
Age: 89
Discharge: HOME | End: 2024-01-17
Payer: MEDICARE

## 2024-01-17 DIAGNOSIS — E11.42 TYPE 2 DIABETES MELLITUS WITH DIABETIC POLYNEUROPATHY, WITHOUT LONG-TERM CURRENT USE OF INSULIN (HCC): ICD-10-CM

## 2024-01-17 LAB
EST. AVERAGE GLUCOSE BLD GHB EST-MCNC: 151 MG/DL
HBA1C MFR BLD: 6.9 % (ref 4–6)

## 2024-01-17 PROCEDURE — 36415 COLL VENOUS BLD VENIPUNCTURE: CPT

## 2024-01-17 PROCEDURE — 83036 HEMOGLOBIN GLYCOSYLATED A1C: CPT

## 2024-01-22 DIAGNOSIS — R29.898 WEAKNESS OF BOTH LOWER EXTREMITIES: ICD-10-CM

## 2024-01-22 DIAGNOSIS — I95.1 ORTHOSTATIC HYPOTENSION: ICD-10-CM

## 2024-01-22 DIAGNOSIS — Z79.899 ON AMIODARONE THERAPY: ICD-10-CM

## 2024-01-22 DIAGNOSIS — I25.10 ASHD (ARTERIOSCLEROTIC HEART DISEASE): ICD-10-CM

## 2024-01-22 DIAGNOSIS — I10 PRIMARY HYPERTENSION: ICD-10-CM

## 2024-01-22 DIAGNOSIS — I48.0 PAF (PAROXYSMAL ATRIAL FIBRILLATION) (HCC): ICD-10-CM

## 2024-01-22 DIAGNOSIS — Z79.01 CHRONIC ANTICOAGULATION: ICD-10-CM

## 2024-01-22 DIAGNOSIS — E78.2 MIXED HYPERLIPIDEMIA: ICD-10-CM

## 2024-01-29 PROBLEM — D68.69 SECONDARY HYPERCOAGULABLE STATE (HCC): Status: ACTIVE | Noted: 2024-01-29

## 2024-01-29 PROBLEM — L89.151 PRESSURE INJURY OF SACRAL REGION, STAGE 1: Status: ACTIVE | Noted: 2024-01-29

## 2024-02-26 ENCOUNTER — OFFICE VISIT (OUTPATIENT)
Dept: CARDIOLOGY | Age: 89
End: 2024-02-26
Payer: MEDICARE

## 2024-02-26 VITALS
WEIGHT: 177 LBS | HEIGHT: 70 IN | RESPIRATION RATE: 18 BRPM | BODY MASS INDEX: 25.34 KG/M2 | SYSTOLIC BLOOD PRESSURE: 126 MMHG | DIASTOLIC BLOOD PRESSURE: 64 MMHG | HEART RATE: 63 BPM | OXYGEN SATURATION: 97 %

## 2024-02-26 DIAGNOSIS — I95.1 ORTHOSTATIC HYPOTENSION: ICD-10-CM

## 2024-02-26 DIAGNOSIS — Z79.899 ON AMIODARONE THERAPY: ICD-10-CM

## 2024-02-26 DIAGNOSIS — Z79.01 CHRONIC ANTICOAGULATION: ICD-10-CM

## 2024-02-26 DIAGNOSIS — I07.1 SEVERE TRICUSPID REGURGITATION: ICD-10-CM

## 2024-02-26 DIAGNOSIS — I48.0 PAF (PAROXYSMAL ATRIAL FIBRILLATION) (HCC): Primary | ICD-10-CM

## 2024-02-26 DIAGNOSIS — I10 ESSENTIAL HYPERTENSION: ICD-10-CM

## 2024-02-26 DIAGNOSIS — E78.2 MIXED HYPERLIPIDEMIA: ICD-10-CM

## 2024-02-26 DIAGNOSIS — I25.10 ASHD (ARTERIOSCLEROTIC HEART DISEASE): ICD-10-CM

## 2024-02-26 DIAGNOSIS — I48.0 PAF (PAROXYSMAL ATRIAL FIBRILLATION) (HCC): ICD-10-CM

## 2024-02-26 DIAGNOSIS — I50.42 CHRONIC COMBINED SYSTOLIC AND DIASTOLIC CONGESTIVE HEART FAILURE (HCC): ICD-10-CM

## 2024-02-26 PROCEDURE — G8427 DOCREV CUR MEDS BY ELIG CLIN: HCPCS | Performed by: FAMILY MEDICINE

## 2024-02-26 PROCEDURE — G8417 CALC BMI ABV UP PARAM F/U: HCPCS | Performed by: FAMILY MEDICINE

## 2024-02-26 PROCEDURE — 1036F TOBACCO NON-USER: CPT | Performed by: FAMILY MEDICINE

## 2024-02-26 PROCEDURE — G8484 FLU IMMUNIZE NO ADMIN: HCPCS | Performed by: FAMILY MEDICINE

## 2024-02-26 PROCEDURE — 99211 OFF/OP EST MAY X REQ PHY/QHP: CPT | Performed by: FAMILY MEDICINE

## 2024-02-26 PROCEDURE — 99214 OFFICE O/P EST MOD 30 MIN: CPT | Performed by: FAMILY MEDICINE

## 2024-02-26 PROCEDURE — 1123F ACP DISCUSS/DSCN MKR DOCD: CPT | Performed by: FAMILY MEDICINE

## 2024-02-26 RX ORDER — SACUBITRIL AND VALSARTAN 24; 26 MG/1; MG/1
1 TABLET, FILM COATED ORAL 2 TIMES DAILY
Qty: 180 TABLET | Refills: 3 | Status: SHIPPED | OUTPATIENT
Start: 2024-02-26

## 2024-02-26 RX ORDER — SACUBITRIL AND VALSARTAN 24; 26 MG/1; MG/1
0.5 TABLET, FILM COATED ORAL 2 TIMES DAILY
Qty: 180 TABLET | Refills: 3 | Status: SHIPPED | OUTPATIENT
Start: 2024-02-26 | End: 2024-02-26

## 2024-02-26 RX ORDER — FUROSEMIDE 20 MG/1
10 TABLET ORAL 2 TIMES DAILY
Qty: 90 TABLET | Refills: 3 | Status: SHIPPED | OUTPATIENT
Start: 2024-02-26

## 2024-02-26 NOTE — PROGRESS NOTES
I, Sandy Denson am scribing for and in the presence of Walter Orozco MD, MS, F.A.C.C..    Patient: Jordan Nicholson  : 1933  Primary Care Physician: Michael Giron  Today's Date: 2024    REASON FOR CONSULTATION: Coronary Artery Disease (HX:julia,HLD,ASHD, amio, HTN. Pt states he is doing ok. Denies any issues at this time. )    Dear Michael Cox MD,     HPI:  Mr. Nicholson is a 89 y.o. male who was admitted to the hospital for new onset atrial fibrillation. He has never had this in the past. History of 3 vessel bypass in 2010. No heart attacks. No history of weakened heart in the past. He reports following in the past with Dr. Stroud in [place] but has not seen him in over 3 years. He reports it has been awhile since he's seen them. He had echo done on 2021 which shows EF 50-55%  Mild aortic stenosis with peak and mean gradient of 17 and 10 mmHg although it looks worse on 2D imaging.Mitral annular calcification is seen with a mean gradient of 4.2 mmHg consistent with mild calcific mitral stenosis. Mild to moderate mitral regurgitation was seen. Moderate to severe tricuspid regurgitation. Moderate pulmonary hypertension with an estimated right ventricular systolic pressure of 57 mmHg. Diastolic function cannot be properly assessed due to atrial fibrillation. He also had abnormal stress on 2021 which was Equivocal study and EF 37%. He had successful cardioversion on 2021. Echocardiogram done on 2022: EF of 55% LV wall thickness is mildly increased. Severe bi-atrial enlargement. Mild to moderate mitral regurgitation. Moderate to severe tricuspid regurgitation. Moderate pulmonary hypertension with an estimated right ventricular systolic pressure of 61 mmHg. Moderate pulmonic regurgitation. Moderate diastolic dysfunction is seen. He had heart cath done on 2022  Severe four vessel disease involving the left main, LAD, circumflex and right coronary

## 2024-03-07 ENCOUNTER — TELEPHONE (OUTPATIENT)
Dept: CARDIOLOGY | Age: 89
End: 2024-03-07

## 2024-03-07 NOTE — TELEPHONE ENCOUNTER
Pt states he receive his refill of entresto and it came as 1/2 tablet twice daily. He has always been taking 1 whole tablet twice daily and it keeps getting changed to 1/2 tablet twice daily. What dose should he be on?

## 2024-03-08 ENCOUNTER — TELEPHONE (OUTPATIENT)
Dept: CARDIOLOGY | Age: 89
End: 2024-03-08

## 2024-03-08 NOTE — TELEPHONE ENCOUNTER
Please let patient know that if he has been taking 1 tab 2x/day then keep taking that. His pharmacy is likely asking for the wrong refill amount. Sorry about the confusion.

## 2024-03-18 DIAGNOSIS — Z79.899 ON AMIODARONE THERAPY: ICD-10-CM

## 2024-03-18 DIAGNOSIS — Z79.01 CHRONIC ANTICOAGULATION: ICD-10-CM

## 2024-03-18 DIAGNOSIS — I48.0 PAF (PAROXYSMAL ATRIAL FIBRILLATION) (HCC): ICD-10-CM

## 2024-03-18 DIAGNOSIS — E78.2 MIXED HYPERLIPIDEMIA: ICD-10-CM

## 2024-03-18 DIAGNOSIS — I25.10 ASHD (ARTERIOSCLEROTIC HEART DISEASE): ICD-10-CM

## 2024-03-18 DIAGNOSIS — R29.898 WEAKNESS OF BOTH LOWER EXTREMITIES: ICD-10-CM

## 2024-03-18 DIAGNOSIS — I95.1 ORTHOSTATIC HYPOTENSION: ICD-10-CM

## 2024-03-18 DIAGNOSIS — I10 PRIMARY HYPERTENSION: ICD-10-CM

## 2024-03-19 RX ORDER — AMIODARONE HYDROCHLORIDE 200 MG/1
200 TABLET ORAL DAILY
Qty: 90 TABLET | Refills: 3 | Status: SHIPPED | OUTPATIENT
Start: 2024-03-19

## 2024-03-21 ENCOUNTER — HOSPITAL ENCOUNTER (OUTPATIENT)
Age: 89
Discharge: HOME | End: 2024-03-23
Attending: FAMILY MEDICINE
Payer: MEDICARE

## 2024-03-21 VITALS
HEIGHT: 70 IN | WEIGHT: 177.03 LBS | SYSTOLIC BLOOD PRESSURE: 126 MMHG | DIASTOLIC BLOOD PRESSURE: 64 MMHG | BODY MASS INDEX: 25.34 KG/M2

## 2024-03-21 DIAGNOSIS — I95.1 ORTHOSTATIC HYPOTENSION: ICD-10-CM

## 2024-03-21 DIAGNOSIS — Z79.899 ON AMIODARONE THERAPY: ICD-10-CM

## 2024-03-21 DIAGNOSIS — I07.1 SEVERE TRICUSPID REGURGITATION: ICD-10-CM

## 2024-03-21 DIAGNOSIS — I25.10 ASHD (ARTERIOSCLEROTIC HEART DISEASE): ICD-10-CM

## 2024-03-21 DIAGNOSIS — I48.0 PAF (PAROXYSMAL ATRIAL FIBRILLATION) (HCC): ICD-10-CM

## 2024-03-21 DIAGNOSIS — I50.42 CHRONIC COMBINED SYSTOLIC AND DIASTOLIC CONGESTIVE HEART FAILURE (HCC): ICD-10-CM

## 2024-03-21 DIAGNOSIS — Z79.01 CHRONIC ANTICOAGULATION: ICD-10-CM

## 2024-03-21 DIAGNOSIS — E78.2 MIXED HYPERLIPIDEMIA: ICD-10-CM

## 2024-03-21 DIAGNOSIS — I10 ESSENTIAL HYPERTENSION: ICD-10-CM

## 2024-03-21 PROCEDURE — 93306 TTE W/DOPPLER COMPLETE: CPT

## 2024-03-22 LAB
ECHO AV AREA VTI: 1.3 CM2
ECHO AV AREA/BSA VTI: 0.7 CM2/M2
ECHO AV CUSP MM: 1.1 CM
ECHO AV MEAN GRADIENT: 13 MMHG
ECHO AV MEAN VELOCITY: 1.7 M/S
ECHO AV PEAK GRADIENT: 24 MMHG
ECHO AV PEAK VELOCITY: 2.4 M/S
ECHO AV VELOCITY RATIO: 0.42
ECHO AV VTI: 53.8 CM
ECHO BSA: 1.99 M2
ECHO EST RA PRESSURE: 3 MMHG
ECHO LA AREA 2C: 46.6 CM2
ECHO LA AREA 4C: 34.6 CM2
ECHO LA MAJOR AXIS: 8 CM
ECHO LA MINOR AXIS: 7.4 CM
ECHO LA VOL BP: 171 ML (ref 18–58)
ECHO LA VOL MOD A2C: 238 ML (ref 18–58)
ECHO LA VOL MOD A4C: 114 ML (ref 18–58)
ECHO LA VOL/BSA BIPLANE: 86 ML/M2 (ref 16–34)
ECHO LA VOLUME INDEX MOD A2C: 120 ML/M2 (ref 16–34)
ECHO LA VOLUME INDEX MOD A4C: 58 ML/M2 (ref 16–34)
ECHO LV E' LATERAL VELOCITY: 11 CM/S
ECHO LV EDV A2C: 81 ML
ECHO LV EDV A4C: 90 ML
ECHO LV EDV INDEX A4C: 45 ML/M2
ECHO LV EDV NDEX A2C: 41 ML/M2
ECHO LV EJECTION FRACTION A2C: 51 %
ECHO LV EJECTION FRACTION A4C: 59 %
ECHO LV EJECTION FRACTION BIPLANE: 57 % (ref 55–100)
ECHO LV ESV A2C: 40 ML
ECHO LV ESV A4C: 37 ML
ECHO LV ESV INDEX A2C: 20 ML/M2
ECHO LV ESV INDEX A4C: 19 ML/M2
ECHO LV FRACTIONAL SHORTENING: 34 % (ref 28–44)
ECHO LV INTERNAL DIMENSION DIASTOLE INDEX: 2.53 CM/M2
ECHO LV INTERNAL DIMENSION DIASTOLIC: 5 CM (ref 4.2–5.9)
ECHO LV INTERNAL DIMENSION SYSTOLIC INDEX: 1.67 CM/M2
ECHO LV INTERNAL DIMENSION SYSTOLIC: 3.3 CM
ECHO LV IVSD: 1.5 CM (ref 0.6–1)
ECHO LV MASS 2D: 338.8 G (ref 88–224)
ECHO LV MASS INDEX 2D: 171.1 G/M2 (ref 49–115)
ECHO LV POSTERIOR WALL DIASTOLIC: 1.6 CM (ref 0.6–1)
ECHO LV RELATIVE WALL THICKNESS RATIO: 0.64
ECHO LVOT AREA: 3.1 CM2
ECHO LVOT AV VTI INDEX: 0.41
ECHO LVOT DIAM: 2 CM
ECHO LVOT MEAN GRADIENT: 2 MMHG
ECHO LVOT PEAK GRADIENT: 4 MMHG
ECHO LVOT PEAK VELOCITY: 1 M/S
ECHO LVOT STROKE VOLUME INDEX: 35 ML/M2
ECHO LVOT SV: 69.4 ML
ECHO LVOT VTI: 22.1 CM
ECHO MV A VELOCITY: 0.7 M/S
ECHO MV E DECELERATION TIME (DT): 243 MS
ECHO MV E VELOCITY: 1.56 M/S
ECHO MV E/A RATIO: 2.23
ECHO MV E/E' LATERAL: 14.18
ECHO MV MEAN GRADIENT: 5 MMHG
ECHO PV MAX VELOCITY: 1 M/S
ECHO PV PEAK GRADIENT: 4 MMHG
ECHO RIGHT VENTRICULAR SYSTOLIC PRESSURE (RVSP): 42 MMHG
ECHO TV REGURGITANT MAX VELOCITY: 3.12 M/S
ECHO TV REGURGITANT PEAK GRADIENT: 39 MMHG

## 2024-03-23 NOTE — RESULT ENCOUNTER NOTE
Please let Mr. Nicholson know that his test was abnormal and please make them an appointment in 2 WEEKS if not already done. Thanks.    Pam

## 2024-03-25 ENCOUNTER — TELEPHONE (OUTPATIENT)
Dept: CARDIOLOGY | Age: 89
End: 2024-03-25

## 2024-03-25 NOTE — TELEPHONE ENCOUNTER
----- Message from Walter Orozco MD sent at 3/22/2024 11:52 PM EDT -----  Please let Mr. Nicholson know that his test was abnormal and please make them an appointment in 2 WEEKS if not already done. Thanks.    Pam

## 2024-03-27 ENCOUNTER — OFFICE VISIT (OUTPATIENT)
Dept: CARDIOLOGY | Age: 89
End: 2024-03-27
Payer: MEDICARE

## 2024-03-27 VITALS
HEIGHT: 70 IN | BODY MASS INDEX: 25.34 KG/M2 | HEART RATE: 61 BPM | RESPIRATION RATE: 18 BRPM | DIASTOLIC BLOOD PRESSURE: 61 MMHG | SYSTOLIC BLOOD PRESSURE: 122 MMHG | WEIGHT: 177 LBS | OXYGEN SATURATION: 96 %

## 2024-03-27 DIAGNOSIS — Z95.1 S/P CABG X 3: ICD-10-CM

## 2024-03-27 DIAGNOSIS — I35.0 SEVERE AORTIC STENOSIS: ICD-10-CM

## 2024-03-27 DIAGNOSIS — Z86.79 HISTORY OF ORTHOSTATIC HYPOTENSION: ICD-10-CM

## 2024-03-27 DIAGNOSIS — Z79.899 ON AMIODARONE THERAPY: ICD-10-CM

## 2024-03-27 DIAGNOSIS — I34.0 MODERATE MITRAL REGURGITATION: ICD-10-CM

## 2024-03-27 DIAGNOSIS — I10 ESSENTIAL HYPERTENSION: ICD-10-CM

## 2024-03-27 DIAGNOSIS — E78.2 MIXED HYPERLIPIDEMIA: ICD-10-CM

## 2024-03-27 DIAGNOSIS — Z79.01 CHRONIC ANTICOAGULATION: ICD-10-CM

## 2024-03-27 DIAGNOSIS — I50.42 CHRONIC COMBINED SYSTOLIC AND DIASTOLIC CONGESTIVE HEART FAILURE (HCC): ICD-10-CM

## 2024-03-27 DIAGNOSIS — I48.0 PAF (PAROXYSMAL ATRIAL FIBRILLATION) (HCC): Primary | ICD-10-CM

## 2024-03-27 DIAGNOSIS — I25.10 ASHD (ARTERIOSCLEROTIC HEART DISEASE): ICD-10-CM

## 2024-03-27 PROBLEM — I50.43 ACUTE ON CHRONIC COMBINED SYSTOLIC AND DIASTOLIC CONGESTIVE HEART FAILURE (HCC): Status: ACTIVE | Noted: 2024-03-27

## 2024-03-27 PROCEDURE — 1123F ACP DISCUSS/DSCN MKR DOCD: CPT | Performed by: PHYSICIAN ASSISTANT

## 2024-03-27 PROCEDURE — 93005 ELECTROCARDIOGRAM TRACING: CPT | Performed by: PHYSICIAN ASSISTANT

## 2024-03-27 PROCEDURE — G8427 DOCREV CUR MEDS BY ELIG CLIN: HCPCS | Performed by: PHYSICIAN ASSISTANT

## 2024-03-27 PROCEDURE — G8484 FLU IMMUNIZE NO ADMIN: HCPCS | Performed by: PHYSICIAN ASSISTANT

## 2024-03-27 PROCEDURE — 93005 ELECTROCARDIOGRAM TRACING: CPT | Performed by: FAMILY MEDICINE

## 2024-03-27 PROCEDURE — G8417 CALC BMI ABV UP PARAM F/U: HCPCS | Performed by: PHYSICIAN ASSISTANT

## 2024-03-27 PROCEDURE — 99214 OFFICE O/P EST MOD 30 MIN: CPT | Performed by: PHYSICIAN ASSISTANT

## 2024-03-27 PROCEDURE — 1036F TOBACCO NON-USER: CPT | Performed by: PHYSICIAN ASSISTANT

## 2024-03-27 NOTE — PROGRESS NOTES
Patient: Jordan Nicholson  : 1933  Primary Care Physician: Michael Giron  Today's Date: 3/27/2024    REASON FOR CONSULTATION: Coronary Artery Disease (HX: julia, HLD, ASHD, amio, HTN. Pt is here to follow up after his abn echo done on 3/31/24. Pt reports he is doing well. Denies CP, light headed/dizziness, palps, SOB. )    Dear Michael Cox MD,     HPI:  Mr. Nicholson is a 89 y.o. male who was admitted to the hospital for new onset atrial fibrillation. He has never had this in the past. History of 3 vessel bypass in 2010. No heart attacks. No history of weakened heart in the past. He reports following in the past with Dr. Stroud in [place] but has not seen him in over 3 years. He reports it has been awhile since he's seen them. He had echo done on 2021 which shows EF 50-55%  Mild aortic stenosis with peak and mean gradient of 17 and 10 mmHg although it looks worse on 2D imaging.Mitral annular calcification is seen with a mean gradient of 4.2 mmHg consistent with mild calcific mitral stenosis. Mild to moderate mitral regurgitation was seen. Moderate to severe tricuspid regurgitation. Moderate pulmonary hypertension with an estimated right ventricular systolic pressure of 57 mmHg. Diastolic function cannot be properly assessed due to atrial fibrillation. He also had abnormal stress on 2021 which was Equivocal study and EF 37%. He had successful cardioversion on 2021. Echocardiogram done on 2022: EF of 55% LV wall thickness is mildly increased. Severe bi-atrial enlargement. Mild to moderate mitral regurgitation. Moderate to severe tricuspid regurgitation. Moderate pulmonary hypertension with an estimated right ventricular systolic pressure of 61 mmHg. Moderate pulmonic regurgitation. Moderate diastolic dysfunction is seen. He had heart cath done on 2022  Severe four vessel disease involving the left main, LAD, circumflex and right coronary arteries

## 2024-04-03 ENCOUNTER — ANESTHESIA (OUTPATIENT)
Dept: INTERVENTIONAL RADIOLOGY/VASCULAR | Age: 89
End: 2024-04-03
Payer: MEDICARE

## 2024-04-03 ENCOUNTER — HOSPITAL ENCOUNTER (OUTPATIENT)
Age: 89
Setting detail: OUTPATIENT SURGERY
Discharge: HOME | End: 2024-04-05
Attending: FAMILY MEDICINE
Payer: MEDICARE

## 2024-04-03 ENCOUNTER — ANESTHESIA EVENT (OUTPATIENT)
Dept: INTERVENTIONAL RADIOLOGY/VASCULAR | Age: 89
End: 2024-04-03
Payer: MEDICARE

## 2024-04-03 ENCOUNTER — HOSPITAL ENCOUNTER (OUTPATIENT)
Age: 89
Setting detail: OUTPATIENT SURGERY
Discharge: HOME | End: 2024-04-03
Attending: FAMILY MEDICINE | Admitting: FAMILY MEDICINE
Payer: MEDICARE

## 2024-04-03 VITALS
WEIGHT: 177.03 LBS | HEIGHT: 70 IN | SYSTOLIC BLOOD PRESSURE: 161 MMHG | BODY MASS INDEX: 25.34 KG/M2 | DIASTOLIC BLOOD PRESSURE: 71 MMHG

## 2024-04-03 VITALS
OXYGEN SATURATION: 93 % | RESPIRATION RATE: 13 BRPM | DIASTOLIC BLOOD PRESSURE: 60 MMHG | HEART RATE: 52 BPM | TEMPERATURE: 97.1 F | SYSTOLIC BLOOD PRESSURE: 125 MMHG

## 2024-04-03 DIAGNOSIS — Z79.01 CHRONIC ANTICOAGULATION: ICD-10-CM

## 2024-04-03 DIAGNOSIS — I48.0 PAF (PAROXYSMAL ATRIAL FIBRILLATION) (HCC): ICD-10-CM

## 2024-04-03 DIAGNOSIS — E78.2 MIXED HYPERLIPIDEMIA: ICD-10-CM

## 2024-04-03 DIAGNOSIS — I35.0 SEVERE AORTIC STENOSIS: ICD-10-CM

## 2024-04-03 DIAGNOSIS — I10 ESSENTIAL HYPERTENSION: ICD-10-CM

## 2024-04-03 DIAGNOSIS — Z86.79 HISTORY OF ORTHOSTATIC HYPOTENSION: ICD-10-CM

## 2024-04-03 DIAGNOSIS — I25.10 ASHD (ARTERIOSCLEROTIC HEART DISEASE): ICD-10-CM

## 2024-04-03 DIAGNOSIS — Z79.899 ON AMIODARONE THERAPY: ICD-10-CM

## 2024-04-03 LAB
ANION GAP SERPL CALCULATED.3IONS-SCNC: 10 MMOL/L (ref 9–17)
BASOPHILS # BLD: 0.08 K/UL (ref 0–0.2)
BASOPHILS NFR BLD: 1 % (ref 0–2)
BUN SERPL-MCNC: 24 MG/DL (ref 8–23)
BUN/CREAT SERPL: 27 (ref 9–20)
CALCIUM SERPL-MCNC: 9.3 MG/DL (ref 8.6–10.4)
CHLORIDE SERPL-SCNC: 101 MMOL/L (ref 98–107)
CO2 SERPL-SCNC: 25 MMOL/L (ref 20–31)
CREAT SERPL-MCNC: 0.9 MG/DL (ref 0.7–1.2)
ECHO AV MEAN GRADIENT: 44 MMHG
ECHO AV PEAK GRADIENT: 70 MMHG
ECHO AV PEAK VELOCITY: 4.2 M/S
ECHO BSA: 1.99 M2
ECHO BSA: 1.99 M2
EOSINOPHIL # BLD: 0.15 K/UL (ref 0–0.44)
EOSINOPHILS RELATIVE PERCENT: 2 % (ref 1–4)
ERYTHROCYTE [DISTWIDTH] IN BLOOD BY AUTOMATED COUNT: 16.1 % (ref 11.8–14.4)
GFR SERPL CREATININE-BSD FRML MDRD: 81 ML/MIN/1.73M2
GLUCOSE SERPL-MCNC: 122 MG/DL (ref 70–99)
HCT VFR BLD AUTO: 33.3 % (ref 40.7–50.3)
HGB BLD-MCNC: 10.1 G/DL (ref 13–17)
IMM GRANULOCYTES # BLD AUTO: <0.03 K/UL (ref 0–0.3)
IMM GRANULOCYTES NFR BLD: 0 %
LYMPHOCYTES NFR BLD: 1.36 K/UL (ref 1.1–3.7)
LYMPHOCYTES RELATIVE PERCENT: 19 % (ref 24–43)
MCH RBC QN AUTO: 25 PG (ref 25.2–33.5)
MCHC RBC AUTO-ENTMCNC: 30.3 G/DL (ref 28.4–34.8)
MCV RBC AUTO: 82.4 FL (ref 82.6–102.9)
MONOCYTES NFR BLD: 0.79 K/UL (ref 0.1–1.2)
MONOCYTES NFR BLD: 11 % (ref 3–12)
NEUTROPHILS NFR BLD: 67 % (ref 36–65)
NEUTS SEG NFR BLD: 4.69 K/UL (ref 1.5–8.1)
NRBC BLD-RTO: 0 PER 100 WBC
PLATELET # BLD AUTO: 260 K/UL (ref 138–453)
PMV BLD AUTO: 10 FL (ref 8.1–13.5)
POTASSIUM SERPL-SCNC: 5.2 MMOL/L (ref 3.7–5.3)
RBC # BLD AUTO: 4.04 M/UL (ref 4.21–5.77)
SODIUM SERPL-SCNC: 136 MMOL/L (ref 135–144)
WBC OTHER # BLD: 7.1 K/UL (ref 3.5–11.3)

## 2024-04-03 PROCEDURE — 2500000003 HC RX 250 WO HCPCS

## 2024-04-03 PROCEDURE — 2580000003 HC RX 258

## 2024-04-03 PROCEDURE — 7100000011 HC PHASE II RECOVERY - ADDTL 15 MIN: Performed by: FAMILY MEDICINE

## 2024-04-03 PROCEDURE — 93312 ECHO TRANSESOPHAGEAL: CPT

## 2024-04-03 PROCEDURE — 3700000001 HC ADD 15 MINUTES (ANESTHESIA): Performed by: FAMILY MEDICINE

## 2024-04-03 PROCEDURE — 3700000000 HC ANESTHESIA ATTENDED CARE: Performed by: FAMILY MEDICINE

## 2024-04-03 PROCEDURE — 85025 COMPLETE CBC W/AUTO DIFF WBC: CPT

## 2024-04-03 PROCEDURE — 6360000002 HC RX W HCPCS

## 2024-04-03 PROCEDURE — 80048 BASIC METABOLIC PNL TOTAL CA: CPT

## 2024-04-03 PROCEDURE — 36415 COLL VENOUS BLD VENIPUNCTURE: CPT

## 2024-04-03 PROCEDURE — 7100000010 HC PHASE II RECOVERY - FIRST 15 MIN: Performed by: FAMILY MEDICINE

## 2024-04-03 RX ORDER — LIDOCAINE HYDROCHLORIDE 20 MG/ML
INJECTION, SOLUTION EPIDURAL; INFILTRATION; INTRACAUDAL; PERINEURAL PRN
Status: DISCONTINUED | OUTPATIENT
Start: 2024-04-03 | End: 2024-04-03 | Stop reason: SDUPTHER

## 2024-04-03 RX ORDER — PROPOFOL 10 MG/ML
INJECTION, EMULSION INTRAVENOUS PRN
Status: DISCONTINUED | OUTPATIENT
Start: 2024-04-03 | End: 2024-04-03 | Stop reason: SDUPTHER

## 2024-04-03 RX ORDER — SODIUM CHLORIDE, SODIUM LACTATE, POTASSIUM CHLORIDE, CALCIUM CHLORIDE 600; 310; 30; 20 MG/100ML; MG/100ML; MG/100ML; MG/100ML
INJECTION, SOLUTION INTRAVENOUS CONTINUOUS PRN
Status: DISCONTINUED | OUTPATIENT
Start: 2024-04-03 | End: 2024-04-03 | Stop reason: SDUPTHER

## 2024-04-03 RX ORDER — KETAMINE HCL 50MG/ML(1)
SYRINGE (ML) INTRAVENOUS PRN
Status: DISCONTINUED | OUTPATIENT
Start: 2024-04-03 | End: 2024-04-03 | Stop reason: SDUPTHER

## 2024-04-03 RX ADMIN — PROPOFOL 10 MG: 10 INJECTION, EMULSION INTRAVENOUS at 12:08

## 2024-04-03 RX ADMIN — PROPOFOL 10 MG: 10 INJECTION, EMULSION INTRAVENOUS at 12:10

## 2024-04-03 RX ADMIN — Medication 10 MG: at 12:05

## 2024-04-03 RX ADMIN — SODIUM CHLORIDE, POTASSIUM CHLORIDE, SODIUM LACTATE AND CALCIUM CHLORIDE: 600; 310; 30; 20 INJECTION, SOLUTION INTRAVENOUS at 11:54

## 2024-04-03 RX ADMIN — PROPOFOL 10 MG: 10 INJECTION, EMULSION INTRAVENOUS at 12:13

## 2024-04-03 RX ADMIN — PROPOFOL 10 MG: 10 INJECTION, EMULSION INTRAVENOUS at 12:16

## 2024-04-03 RX ADMIN — PROPOFOL 10 MG: 10 INJECTION, EMULSION INTRAVENOUS at 12:06

## 2024-04-03 RX ADMIN — PROPOFOL 10 MG: 10 INJECTION, EMULSION INTRAVENOUS at 12:11

## 2024-04-03 RX ADMIN — PROPOFOL 10 MG: 10 INJECTION, EMULSION INTRAVENOUS at 12:14

## 2024-04-03 RX ADMIN — LIDOCAINE HYDROCHLORIDE 100 MG: 20 INJECTION, SOLUTION EPIDURAL; INFILTRATION; INTRACAUDAL; PERINEURAL at 12:06

## 2024-04-03 ASSESSMENT — ENCOUNTER SYMPTOMS: DYSPNEA ACTIVITY LEVEL: AFTER AMBULATING 2 FLIGHTS OF STAIRS

## 2024-04-03 NOTE — ANESTHESIA PRE PROCEDURE
reviewed      Echocardiogram reviewed    Cleared by cardiology              Neuro/Psych:   Negative Neuro/Psych ROS              GI/Hepatic/Renal:             Endo/Other:    (+) DiabetesType II DM, well controlled, using insulin, blood dyscrasia: anticoagulation therapy:..                 Abdominal:             Vascular: negative vascular ROS.         Other Findings:       Anesthesia Plan      general and TIVA     ASA 4       Induction: intravenous.      Anesthetic plan and risks discussed with patient.                    Michelle Ca, SAGAR - CRNA   4/3/2024

## 2024-04-03 NOTE — DISCHARGE INSTRUCTIONS
Sedation for a Medical Procedure: JEWEL after-care Instructions  Your Care Instructions  For a minor procedure, you will get a sedative to help you relax. This drug will make you sleepy. It is usually given in a vein (by IV). A spray was also used to numb the throat.  Your throat may feel may feel sore.     Common side effects from sedation:  You will feel sleepy. Rest until the sedation has worn off.  Nausea and vomiting is common and usually does not last long.  Feeling tired.  Follow-up care is a key part of your treatment and safety.   Be sure to make and go to all appointments, and call your doctor if you are having problems. It's also a good idea to know your test results and keep a list of the medicines you take.  How can you care for yourself at home?  Activity  Don't do anything for 24 hours that requires attention to detail or until the effects of sedation completely wear off.  Do not drive or operate any machinery until the medicine wears off and you can think clearly and react easily.  Do not drink alcohol for 24 hours after procedure.  Rest when you feel tired. Getting enough sleep will help you recover.  Diet    Drink plenty of fluids (unless your doctor tells you not to). Also, try to eat just soft foods for next few days until throat irritation is gone.  Don't drink alcohol for 24 hours.  Medicines  You may use Cepacol lozenges or sucrets for the next few days until throat feels    better.    Be safe with medicines. Read and follow all instructions on the label.  If the doctor gave you a prescription medicine for pain, take it as prescribed.  If you are not taking a prescription pain medicine, ask your doctor if you can take an over-the-counter medicine.  If you think your pain medicine is making you sick to your stomach:  Take your medicine after meals (unless your doctor has told you not to).  Ask your doctor for a different pain medicine.  When should you call for help?  Call 911 anytime you think

## 2024-04-03 NOTE — PROGRESS NOTES
All discharge instructions given with daughter at side. All questions answered at this time. All belongings returned. PIV removed per protocol.

## 2024-04-03 NOTE — ANESTHESIA POSTPROCEDURE EVALUATION
Department of Anesthesiology  Postprocedure Note    Patient: Jordan Nicholson  MRN: [id number]  YOB: 1933  Date of evaluation: 4/3/2024    Procedure Summary       Date: 04/03/24 Room / Location: [facility] CARDIAC CATH/IR LAB    Anesthesia Start: 1203 Anesthesia Stop:     Procedure: Danielito during cath case Diagnosis:       Severe aortic stenosis by prior echocardiogram      (Severe Aortic stenosis)    Providers: Walter Orozco MD Responsible Provider: Michelle Ca APRN - CRNA    Anesthesia Type: general, TIVA ASA Status: 4            Anesthesia Type: No value filed.    Fabricio Phase I: Fabricio Score: 10    Fabricio Phase II:      Anesthesia Post Evaluation    Patient location during evaluation: PACU  Patient participation: complete - patient participated  Level of consciousness: awake and alert  Airway patency: patent  Nausea & Vomiting: no nausea and no vomiting  Cardiovascular status: blood pressure returned to baseline  Respiratory status: acceptable  Hydration status: euvolemic  Pain management: adequate and satisfactory to patient        There were no known notable events for this encounter.

## 2024-04-04 DIAGNOSIS — I35.0 SEVERE AORTIC STENOSIS BY PRIOR ECHOCARDIOGRAM: Primary | ICD-10-CM

## 2024-04-06 DIAGNOSIS — I35.0 SEVERE AORTIC STENOSIS BY PRIOR ECHOCARDIOGRAM: Primary | ICD-10-CM

## 2024-05-31 ENCOUNTER — TELEPHONE (OUTPATIENT)
Dept: CARDIOLOGY | Age: 89
End: 2024-05-31

## 2024-05-31 ENCOUNTER — HOSPITAL ENCOUNTER (OUTPATIENT)
Age: 89
Discharge: HOME | End: 2024-05-31
Payer: MEDICARE

## 2024-05-31 DIAGNOSIS — R06.02 SOB (SHORTNESS OF BREATH): ICD-10-CM

## 2024-05-31 DIAGNOSIS — Z79.01 CHRONIC ANTICOAGULATION: ICD-10-CM

## 2024-05-31 DIAGNOSIS — I48.0 PAF (PAROXYSMAL ATRIAL FIBRILLATION) (HCC): Primary | ICD-10-CM

## 2024-05-31 DIAGNOSIS — I48.0 PAF (PAROXYSMAL ATRIAL FIBRILLATION) (HCC): ICD-10-CM

## 2024-05-31 DIAGNOSIS — Z79.899 ON AMIODARONE THERAPY: ICD-10-CM

## 2024-05-31 LAB
ANION GAP SERPL CALCULATED.3IONS-SCNC: 9 MMOL/L (ref 9–17)
BNP SERPL-MCNC: 3088 PG/ML
BUN SERPL-MCNC: 32 MG/DL (ref 8–23)
BUN/CREAT SERPL: 27 (ref 9–20)
CALCIUM SERPL-MCNC: 8.8 MG/DL (ref 8.6–10.4)
CHLORIDE SERPL-SCNC: 98 MMOL/L (ref 98–107)
CO2 SERPL-SCNC: 28 MMOL/L (ref 20–31)
CREAT SERPL-MCNC: 1.2 MG/DL (ref 0.7–1.2)
GFR, ESTIMATED: 57 ML/MIN/1.73M2
GLUCOSE SERPL-MCNC: 88 MG/DL (ref 70–99)
POTASSIUM SERPL-SCNC: 4.9 MMOL/L (ref 3.7–5.3)
SODIUM SERPL-SCNC: 135 MMOL/L (ref 135–144)

## 2024-05-31 PROCEDURE — 80048 BASIC METABOLIC PNL TOTAL CA: CPT

## 2024-05-31 PROCEDURE — 36415 COLL VENOUS BLD VENIPUNCTURE: CPT

## 2024-05-31 PROCEDURE — 83880 ASSAY OF NATRIURETIC PEPTIDE: CPT

## 2024-05-31 RX ORDER — METOLAZONE 2.5 MG/1
2.5 TABLET ORAL
Qty: 24 TABLET | Refills: 3 | Status: SHIPPED | OUTPATIENT
Start: 2024-06-03

## 2024-05-31 NOTE — TELEPHONE ENCOUNTER
Pt is complaining of bilateral leg edema, is up 8 pounds. He did put on compression socks and that helped some.Denies any other symptoms. BP is /55  64 and 135/62 64 and /52 64 and 134/51  63. He is taking  20 mg Lasix in the morning and 10mg in the evening.Ryann spoke with pt and wants him to get lab work done prior to any medication changes.

## 2024-06-06 ENCOUNTER — HOSPITAL ENCOUNTER (OUTPATIENT)
Age: 89
Discharge: HOME | End: 2024-06-06
Payer: MEDICARE

## 2024-06-06 ENCOUNTER — TELEPHONE (OUTPATIENT)
Dept: CARDIOLOGY | Age: 89
End: 2024-06-06

## 2024-06-06 DIAGNOSIS — I48.0 PAF (PAROXYSMAL ATRIAL FIBRILLATION) (HCC): Primary | ICD-10-CM

## 2024-06-06 DIAGNOSIS — R06.02 SOB (SHORTNESS OF BREATH): ICD-10-CM

## 2024-06-06 DIAGNOSIS — E78.2 MIXED HYPERLIPIDEMIA: ICD-10-CM

## 2024-06-06 DIAGNOSIS — I10 ESSENTIAL HYPERTENSION: ICD-10-CM

## 2024-06-06 DIAGNOSIS — I48.0 PAF (PAROXYSMAL ATRIAL FIBRILLATION) (HCC): ICD-10-CM

## 2024-06-06 LAB
ANION GAP SERPL CALCULATED.3IONS-SCNC: 11 MMOL/L (ref 9–17)
BUN SERPL-MCNC: 42 MG/DL (ref 8–23)
BUN/CREAT SERPL: 28 (ref 9–20)
CALCIUM SERPL-MCNC: 9.6 MG/DL (ref 8.6–10.4)
CHLORIDE SERPL-SCNC: 98 MMOL/L (ref 98–107)
CO2 SERPL-SCNC: 29 MMOL/L (ref 20–31)
CREAT SERPL-MCNC: 1.5 MG/DL (ref 0.7–1.2)
GFR, ESTIMATED: 44 ML/MIN/1.73M2
GLUCOSE SERPL-MCNC: 103 MG/DL (ref 70–99)
POTASSIUM SERPL-SCNC: 4.4 MMOL/L (ref 3.7–5.3)
SODIUM SERPL-SCNC: 138 MMOL/L (ref 135–144)

## 2024-06-06 PROCEDURE — 80048 BASIC METABOLIC PNL TOTAL CA: CPT

## 2024-06-06 PROCEDURE — 36415 COLL VENOUS BLD VENIPUNCTURE: CPT

## 2024-06-06 NOTE — RESULT ENCOUNTER NOTE
Please notify patient that their lab results are stable but he appears to be getting dry. Please resume lasix 10 mg daily (1/2 of the 20 mg tablet daily). Please makes sure he is drinking at least 48 ounces daily.    Please continue current treatment and follow up.

## 2024-06-06 NOTE — TELEPHONE ENCOUNTER
----- Message from Minerva Hutchinson PA-C sent at 6/6/2024  1:00 PM EDT -----  Please notify patient that their lab results are stable but he appears to be getting dry. Please resume lasix 10 mg daily (1/2 of the 20 mg tablet daily). Please makes sure he is drinking at least 48 ounces daily.    Please continue current treatment and follow up.

## 2024-07-25 DIAGNOSIS — I50.42 CHRONIC COMBINED SYSTOLIC AND DIASTOLIC HEART FAILURE, NYHA CLASS 3 (HCC): Primary | ICD-10-CM

## 2024-07-25 RX ORDER — METOLAZONE 2.5 MG/1
2.5 TABLET ORAL
Qty: 24 TABLET | Refills: 3 | Status: CANCELLED | OUTPATIENT
Start: 2024-07-25

## 2024-07-26 RX ORDER — METOLAZONE 2.5 MG/1
2.5 TABLET ORAL
Qty: 90 TABLET | Refills: 3 | Status: SHIPPED | OUTPATIENT
Start: 2024-07-29

## 2024-07-27 ENCOUNTER — HOSPITAL ENCOUNTER (OUTPATIENT)
Age: 89
Discharge: HOME | End: 2024-07-27
Payer: MEDICARE

## 2024-07-27 DIAGNOSIS — I10 ESSENTIAL HYPERTENSION: ICD-10-CM

## 2024-07-27 DIAGNOSIS — E78.2 MIXED HYPERLIPIDEMIA: ICD-10-CM

## 2024-07-27 DIAGNOSIS — E11.42 TYPE 2 DIABETES MELLITUS WITH DIABETIC POLYNEUROPATHY, WITHOUT LONG-TERM CURRENT USE OF INSULIN (HCC): ICD-10-CM

## 2024-07-27 LAB
ALBUMIN SERPL-MCNC: 4.3 G/DL (ref 3.5–5.2)
ALBUMIN/GLOB SERPL: 1.5 {RATIO} (ref 1–2.5)
ALP SERPL-CCNC: 80 U/L (ref 40–129)
ALT SERPL-CCNC: 22 U/L (ref 5–41)
ANION GAP SERPL CALCULATED.3IONS-SCNC: 11 MMOL/L (ref 9–17)
AST SERPL-CCNC: 25 U/L
BILIRUB SERPL-MCNC: 0.5 MG/DL (ref 0.3–1.2)
BUN SERPL-MCNC: 28 MG/DL (ref 8–23)
BUN/CREAT SERPL: 22 (ref 9–20)
CALCIUM SERPL-MCNC: 9.3 MG/DL (ref 8.6–10.4)
CHLORIDE SERPL-SCNC: 96 MMOL/L (ref 98–107)
CHOLEST SERPL-MCNC: 112 MG/DL (ref 0–199)
CHOLESTEROL/HDL RATIO: 4
CO2 SERPL-SCNC: 30 MMOL/L (ref 20–31)
CREAT SERPL-MCNC: 1.3 MG/DL (ref 0.7–1.2)
EST. AVERAGE GLUCOSE BLD GHB EST-MCNC: 146 MG/DL
GFR, ESTIMATED: 52 ML/MIN/1.73M2
GLUCOSE P FAST SERPL-MCNC: 78 MG/DL (ref 70–99)
HBA1C MFR BLD: 6.7 % (ref 4–6)
HDLC SERPL-MCNC: 29 MG/DL
LDLC SERPL CALC-MCNC: 61 MG/DL (ref 0–100)
POTASSIUM SERPL-SCNC: 4.5 MMOL/L (ref 3.7–5.3)
PROT SERPL-MCNC: 7.2 G/DL (ref 6.4–8.3)
SODIUM SERPL-SCNC: 137 MMOL/L (ref 135–144)
TRIGL SERPL-MCNC: 114 MG/DL (ref 0–149)
VLDLC SERPL CALC-MCNC: 23 MG/DL

## 2024-07-27 PROCEDURE — 36415 COLL VENOUS BLD VENIPUNCTURE: CPT

## 2024-07-27 PROCEDURE — 80061 LIPID PANEL: CPT

## 2024-07-27 PROCEDURE — 80053 COMPREHEN METABOLIC PANEL: CPT

## 2024-07-27 PROCEDURE — 83036 HEMOGLOBIN GLYCOSYLATED A1C: CPT

## 2024-07-29 ENCOUNTER — HOSPITAL ENCOUNTER (OUTPATIENT)
Age: 89
Setting detail: SPECIMEN
Discharge: HOME | End: 2024-07-29
Payer: MEDICARE

## 2024-07-29 LAB
CREAT UR-MCNC: 68 MG/DL (ref 39–259)
MICROALBUMIN UR-MCNC: <12 MG/L (ref 0–20)
MICROALBUMIN/CREAT UR-RTO: NORMAL MCG/MG CREAT (ref 0–17)

## 2024-07-29 PROCEDURE — 82043 UR ALBUMIN QUANTITATIVE: CPT

## 2024-07-29 PROCEDURE — 82570 ASSAY OF URINE CREATININE: CPT

## 2024-08-28 PROBLEM — D50.0 IRON DEFICIENCY ANEMIA DUE TO CHRONIC BLOOD LOSS: Status: ACTIVE | Noted: 2024-08-28

## 2024-08-28 PROBLEM — I27.20 PULMONARY HYPERTENSION (HCC): Status: ACTIVE | Noted: 2024-08-28

## 2024-09-09 ENCOUNTER — OFFICE VISIT (OUTPATIENT)
Dept: CARDIOLOGY | Age: 89
End: 2024-09-09
Payer: MEDICARE

## 2024-09-09 ENCOUNTER — HOSPITAL ENCOUNTER (OUTPATIENT)
Age: 89
Discharge: HOME | End: 2024-09-09
Payer: MEDICARE

## 2024-09-09 VITALS
DIASTOLIC BLOOD PRESSURE: 54 MMHG | SYSTOLIC BLOOD PRESSURE: 103 MMHG | RESPIRATION RATE: 16 BRPM | OXYGEN SATURATION: 98 % | HEIGHT: 70 IN | BODY MASS INDEX: 24.45 KG/M2 | HEART RATE: 62 BPM | WEIGHT: 170.8 LBS

## 2024-09-09 DIAGNOSIS — I48.0 PAF (PAROXYSMAL ATRIAL FIBRILLATION) (HCC): Primary | ICD-10-CM

## 2024-09-09 DIAGNOSIS — I34.0 MODERATE MITRAL REGURGITATION: ICD-10-CM

## 2024-09-09 DIAGNOSIS — I25.10 ASHD (ARTERIOSCLEROTIC HEART DISEASE): ICD-10-CM

## 2024-09-09 DIAGNOSIS — E78.2 MIXED HYPERLIPIDEMIA: ICD-10-CM

## 2024-09-09 DIAGNOSIS — I27.20 MODERATE PULMONARY HYPERTENSION (HCC): ICD-10-CM

## 2024-09-09 DIAGNOSIS — I95.1 ORTHOSTATIC HYPOTENSION: ICD-10-CM

## 2024-09-09 DIAGNOSIS — I35.0 SEVERE AORTIC STENOSIS: ICD-10-CM

## 2024-09-09 DIAGNOSIS — I48.0 PAF (PAROXYSMAL ATRIAL FIBRILLATION) (HCC): ICD-10-CM

## 2024-09-09 DIAGNOSIS — I10 ESSENTIAL HYPERTENSION: ICD-10-CM

## 2024-09-09 DIAGNOSIS — Z79.899 ON AMIODARONE THERAPY: ICD-10-CM

## 2024-09-09 DIAGNOSIS — I50.42 CHRONIC COMBINED SYSTOLIC AND DIASTOLIC HEART FAILURE, NYHA CLASS 3 (HCC): ICD-10-CM

## 2024-09-09 LAB
ERYTHROCYTE [DISTWIDTH] IN BLOOD BY AUTOMATED COUNT: 22.5 % (ref 11.8–14.4)
HCT VFR BLD AUTO: 29.6 % (ref 40.7–50.3)
HGB BLD-MCNC: 8.5 G/DL (ref 13–17)
MCH RBC QN AUTO: 22.7 PG (ref 25.2–33.5)
MCHC RBC AUTO-ENTMCNC: 28.7 G/DL (ref 28.4–34.8)
MCV RBC AUTO: 79.1 FL (ref 82.6–102.9)
NRBC BLD-RTO: 0 PER 100 WBC
PLATELET # BLD AUTO: 240 K/UL (ref 138–453)
PMV BLD AUTO: 9.8 FL (ref 8.1–13.5)
RBC # BLD AUTO: 3.74 M/UL (ref 4.21–5.77)
WBC OTHER # BLD: 5.9 K/UL (ref 3.5–11.3)

## 2024-09-09 PROCEDURE — 99214 OFFICE O/P EST MOD 30 MIN: CPT | Performed by: FAMILY MEDICINE

## 2024-09-09 PROCEDURE — 93005 ELECTROCARDIOGRAM TRACING: CPT | Performed by: FAMILY MEDICINE

## 2024-09-09 PROCEDURE — 1123F ACP DISCUSS/DSCN MKR DOCD: CPT | Performed by: FAMILY MEDICINE

## 2024-09-09 PROCEDURE — 85027 COMPLETE CBC AUTOMATED: CPT

## 2024-09-09 PROCEDURE — 1036F TOBACCO NON-USER: CPT | Performed by: FAMILY MEDICINE

## 2024-09-09 PROCEDURE — 36415 COLL VENOUS BLD VENIPUNCTURE: CPT

## 2024-09-09 PROCEDURE — G8420 CALC BMI NORM PARAMETERS: HCPCS | Performed by: FAMILY MEDICINE

## 2024-09-09 PROCEDURE — 93010 ELECTROCARDIOGRAM REPORT: CPT | Performed by: FAMILY MEDICINE

## 2024-09-09 PROCEDURE — G8427 DOCREV CUR MEDS BY ELIG CLIN: HCPCS | Performed by: FAMILY MEDICINE

## 2024-09-10 ENCOUNTER — TELEPHONE (OUTPATIENT)
Dept: CARDIOLOGY | Age: 89
End: 2024-09-10

## 2024-09-26 ENCOUNTER — TELEPHONE (OUTPATIENT)
Dept: CARDIOLOGY | Age: 89
End: 2024-09-26

## 2024-09-26 DIAGNOSIS — R06.02 SHORTNESS OF BREATH: Primary | ICD-10-CM

## 2024-09-26 NOTE — TELEPHONE ENCOUNTER
Pt daughter called to inform that pt had repeat CBC done on 9/23/24 and his hemoglobin was 10.1.     Also that patient is going to be following up with a hematologist and GI in the near future. Pt daughter just wanted to let you know.

## 2024-09-26 NOTE — TELEPHONE ENCOUNTER
Ok. Thank you for letting us know. Well I hate to bring this up but I tend to think we should get him back on the Eliquis but I would probably start him on the lower dose of 2.5 mg bid and get a repeat Hgb in 1 week and then in 3 weeks. If he is willing please put in order. Thanks.

## 2024-10-03 ENCOUNTER — HOSPITAL ENCOUNTER (OUTPATIENT)
Age: 89
Discharge: HOME | End: 2024-10-03
Payer: MEDICARE

## 2024-10-03 ENCOUNTER — OFFICE VISIT (OUTPATIENT)
Dept: ONCOLOGY | Age: 89
End: 2024-10-03
Payer: MEDICARE

## 2024-10-03 VITALS
DIASTOLIC BLOOD PRESSURE: 56 MMHG | BODY MASS INDEX: 23.77 KG/M2 | SYSTOLIC BLOOD PRESSURE: 115 MMHG | WEIGHT: 166 LBS | TEMPERATURE: 96.8 F | HEIGHT: 70 IN | HEART RATE: 64 BPM | RESPIRATION RATE: 18 BRPM

## 2024-10-03 DIAGNOSIS — D47.2 GAMMOPATHY: ICD-10-CM

## 2024-10-03 DIAGNOSIS — D50.0 IRON DEFICIENCY ANEMIA DUE TO CHRONIC BLOOD LOSS: Primary | ICD-10-CM

## 2024-10-03 DIAGNOSIS — D64.9 NORMOCYTIC ANEMIA: ICD-10-CM

## 2024-10-03 LAB
ALBUMIN SERPL-MCNC: 4.2 G/DL (ref 3.5–5.2)
ALBUMIN/GLOB SERPL: 1.7 {RATIO} (ref 1–2.5)
ALP SERPL-CCNC: 79 U/L (ref 40–129)
ALT SERPL-CCNC: 34 U/L (ref 10–50)
ANION GAP SERPL CALCULATED.3IONS-SCNC: 10 MMOL/L (ref 9–16)
AST SERPL-CCNC: 38 U/L (ref 10–50)
BASOPHILS # BLD: 0.06 K/UL (ref 0–0.2)
BASOPHILS NFR BLD: 1 % (ref 0–2)
BILIRUB SERPL-MCNC: 0.4 MG/DL (ref 0–1.2)
BUN SERPL-MCNC: 30 MG/DL (ref 8–23)
BUN/CREAT SERPL: 20 (ref 9–20)
CALCIUM SERPL-MCNC: 9.5 MG/DL (ref 8.6–10.4)
CHLORIDE SERPL-SCNC: 97 MMOL/L (ref 98–107)
CO2 SERPL-SCNC: 31 MMOL/L (ref 20–31)
CREAT SERPL-MCNC: 1.5 MG/DL (ref 0.7–1.2)
EOSINOPHIL # BLD: 0.17 K/UL (ref 0–0.44)
EOSINOPHILS RELATIVE PERCENT: 3 % (ref 1–4)
ERYTHROCYTE [DISTWIDTH] IN BLOOD BY AUTOMATED COUNT: 24.5 % (ref 11.8–14.4)
GFR, ESTIMATED: 45 ML/MIN/1.73M2
GLUCOSE SERPL-MCNC: 106 MG/DL (ref 74–99)
HCT VFR BLD AUTO: 34.2 % (ref 40.7–50.3)
HGB BLD-MCNC: 10.6 G/DL (ref 13–17)
IMM GRANULOCYTES # BLD AUTO: 0 K/UL (ref 0–0.3)
IMM GRANULOCYTES NFR BLD: 0 %
LYMPHOCYTES NFR BLD: 1.05 K/UL (ref 1.1–3.7)
LYMPHOCYTES RELATIVE PERCENT: 19 % (ref 24–43)
MCH RBC QN AUTO: 25.7 PG (ref 25.2–33.5)
MCHC RBC AUTO-ENTMCNC: 31 G/DL (ref 28.4–34.8)
MCV RBC AUTO: 82.8 FL (ref 82.6–102.9)
MONOCYTES NFR BLD: 0.77 K/UL (ref 0.1–1.2)
MONOCYTES NFR BLD: 14 % (ref 3–12)
MORPHOLOGY: ABNORMAL
MORPHOLOGY: ABNORMAL
NEUTROPHILS NFR BLD: 63 % (ref 36–65)
NEUTS SEG NFR BLD: 3.45 K/UL (ref 1.5–8.1)
NRBC BLD-RTO: 0 PER 100 WBC
PLATELET # BLD AUTO: 171 K/UL (ref 138–453)
PMV BLD AUTO: 9.7 FL (ref 8.1–13.5)
POTASSIUM SERPL-SCNC: 4.7 MMOL/L (ref 3.7–5.3)
PROT SERPL-MCNC: 6.7 G/DL (ref 6.6–8.7)
RBC # BLD AUTO: 4.13 M/UL (ref 4.21–5.77)
SODIUM SERPL-SCNC: 138 MMOL/L (ref 136–145)
WBC OTHER # BLD: 5.5 K/UL (ref 3.5–11.3)

## 2024-10-03 PROCEDURE — 84155 ASSAY OF PROTEIN SERUM: CPT

## 2024-10-03 PROCEDURE — 84165 PROTEIN E-PHORESIS SERUM: CPT

## 2024-10-03 PROCEDURE — 80053 COMPREHEN METABOLIC PANEL: CPT

## 2024-10-03 PROCEDURE — 85025 COMPLETE CBC W/AUTO DIFF WBC: CPT

## 2024-10-03 PROCEDURE — 83521 IG LIGHT CHAINS FREE EACH: CPT

## 2024-10-03 PROCEDURE — 36415 COLL VENOUS BLD VENIPUNCTURE: CPT

## 2024-10-03 PROCEDURE — 82784 ASSAY IGA/IGD/IGG/IGM EACH: CPT

## 2024-10-03 PROCEDURE — 99202 OFFICE O/P NEW SF 15 MIN: CPT | Performed by: INTERNAL MEDICINE

## 2024-10-03 NOTE — PROGRESS NOTES
Patient ID: Jordan Nicholson, 9/28/1933, [id number], 89 y.o.  Referred by :  No ref. provider found   Reason for consultation: Normocytic anemia/iron deficiency anemia      HISTORY OF PRESENT ILLNESS:    Oncologic History:    Jordan Nicholson is a very pleasant 89 y.o. male.  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 Dillard    Essential hypertension     History of blood transfusion     History of Holter monitoring 2015    symptomatic PACs    Hx of echocardiogram 2015    normal    Hyperlipidemia     Osteoarthritis     Type 2 diabetes mellitus with diabetic polyneuropathy, without long-term current use of insulin (HCC)     Type II or unspecified type diabetes mellitus without mention of complication, not stated as uncontrolled        Past Surgical History:   Procedure Laterality Date    BELPHAROPTOSIS REPAIR Bilateral 1/9/2015    CARDIAC CATHETERIZATION Left 07/07/2022    left radial/MHT/ Dr. Orozco    CARDIAC PROCEDURE N/A 4/3/2024    Danielito during cath case performed by Walter Orozco MD at [facility] CARDIAC CATH/IR LAB    CARDIOVASCULAR STRESS TEST  4/2010    Abnormal    COLONOSCOPY  11/2009

## 2024-10-04 LAB
ALBUMIN PERCENT: 59 % (ref 45–65)
ALBUMIN SERPL-MCNC: 4 G/DL (ref 3.2–5.2)
ALPHA 2 PERCENT: 12 % (ref 6–13)
ALPHA1 GLOB SERPL ELPH-MCNC: 0.4 G/DL (ref 0.1–0.4)
ALPHA1 GLOB SERPL ELPH-MCNC: 5 % (ref 3–6)
ALPHA2 GLOB SERPL ELPH-MCNC: 0.8 G/DL (ref 0.5–0.9)
B-GLOBULIN SERPL ELPH-MCNC: 0.7 G/DL (ref 0.5–1.1)
B-GLOBULIN SERPL ELPH-MCNC: 10 % (ref 11–19)
FREE KAPPA/LAMBDA RATIO: 2.18 (ref 0.22–1.74)
GAMMA GLOB SERPL ELPH-MCNC: 0.9 G/DL (ref 0.5–1.5)
GAMMA GLOBULIN %: 13 % (ref 9–20)
IGA SERPL-MCNC: 165 MG/DL (ref 70–400)
IGG SERPL-MCNC: 975 MG/DL (ref 700–1600)
IGM SERPL-MCNC: 91 MG/DL (ref 40–230)
KAPPA LC FREE SER-MCNC: 52 MG/L
LAMBDA LC FREE SERPL-MCNC: 23.9 MG/L (ref 4.2–27.7)
PATHOLOGIST: ABNORMAL
PROT PATTERN SERPL ELPH-IMP: ABNORMAL
PROT SERPL-MCNC: 6.7 G/DL (ref 6.6–8.7)
TOTAL PROT. SUM,%: 99 % (ref 98–102)
TOTAL PROT. SUM: 6.8 G/DL (ref 6.3–8.2)

## 2024-10-07 ENCOUNTER — TELEPHONE (OUTPATIENT)
Dept: CARDIOLOGY | Age: 89
End: 2024-10-07

## 2024-10-07 ENCOUNTER — HOSPITAL ENCOUNTER (OUTPATIENT)
Age: 89
Discharge: HOME | End: 2024-10-07
Payer: MEDICARE

## 2024-10-07 DIAGNOSIS — R06.02 SHORTNESS OF BREATH: ICD-10-CM

## 2024-10-07 LAB
BASOPHILS # BLD: 0.05 K/UL (ref 0–0.2)
BASOPHILS NFR BLD: 1 % (ref 0–2)
EOSINOPHIL # BLD: 0.14 K/UL (ref 0–0.44)
EOSINOPHILS RELATIVE PERCENT: 3 % (ref 1–4)
ERYTHROCYTE [DISTWIDTH] IN BLOOD BY AUTOMATED COUNT: 24 % (ref 11.8–14.4)
HCT VFR BLD AUTO: 33.6 % (ref 40.7–50.3)
HGB BLD-MCNC: 10.2 G/DL (ref 13–17)
IMM GRANULOCYTES # BLD AUTO: 0 K/UL (ref 0–0.3)
IMM GRANULOCYTES NFR BLD: 0 %
LYMPHOCYTES NFR BLD: 0.99 K/UL (ref 1.1–3.7)
LYMPHOCYTES RELATIVE PERCENT: 21 % (ref 24–43)
MCH RBC QN AUTO: 25.8 PG (ref 25.2–33.5)
MCHC RBC AUTO-ENTMCNC: 30.4 G/DL (ref 28.4–34.8)
MCV RBC AUTO: 84.8 FL (ref 82.6–102.9)
MONOCYTES NFR BLD: 0.61 K/UL (ref 0.1–1.2)
MONOCYTES NFR BLD: 13 % (ref 3–12)
MORPHOLOGY: ABNORMAL
NEUTROPHILS NFR BLD: 62 % (ref 36–65)
NEUTS SEG NFR BLD: 2.91 K/UL (ref 1.5–8.1)
NRBC BLD-RTO: 0 PER 100 WBC
PLATELET # BLD AUTO: 159 K/UL (ref 138–453)
PMV BLD AUTO: 10 FL (ref 8.1–13.5)
RBC # BLD AUTO: 3.96 M/UL (ref 4.21–5.77)
WBC OTHER # BLD: 4.7 K/UL (ref 3.5–11.3)

## 2024-10-07 PROCEDURE — 85025 COMPLETE CBC W/AUTO DIFF WBC: CPT

## 2024-10-07 PROCEDURE — 36415 COLL VENOUS BLD VENIPUNCTURE: CPT

## 2024-10-07 NOTE — TELEPHONE ENCOUNTER
----- Message from Dr. Walter Orozco MD sent at 10/7/2024  2:59 PM EDT -----  Let Mr. Nicholson know their test result was ok. Will discuss at next visit. Thanks.

## 2024-10-15 ENCOUNTER — OFFICE VISIT (OUTPATIENT)
Dept: GASTROENTEROLOGY | Age: 89
End: 2024-10-15
Payer: MEDICARE

## 2024-10-15 VITALS
SYSTOLIC BLOOD PRESSURE: 137 MMHG | DIASTOLIC BLOOD PRESSURE: 71 MMHG | BODY MASS INDEX: 23.96 KG/M2 | HEART RATE: 61 BPM | WEIGHT: 167 LBS

## 2024-10-15 DIAGNOSIS — D50.9 IRON DEFICIENCY ANEMIA, UNSPECIFIED IRON DEFICIENCY ANEMIA TYPE: Primary | ICD-10-CM

## 2024-10-15 PROCEDURE — 99203 OFFICE O/P NEW LOW 30 MIN: CPT | Performed by: NURSE PRACTITIONER

## 2024-10-15 PROCEDURE — 1036F TOBACCO NON-USER: CPT | Performed by: NURSE PRACTITIONER

## 2024-10-15 PROCEDURE — G8420 CALC BMI NORM PARAMETERS: HCPCS | Performed by: NURSE PRACTITIONER

## 2024-10-15 PROCEDURE — G8427 DOCREV CUR MEDS BY ELIG CLIN: HCPCS | Performed by: NURSE PRACTITIONER

## 2024-10-15 PROCEDURE — 1123F ACP DISCUSS/DSCN MKR DOCD: CPT | Performed by: NURSE PRACTITIONER

## 2024-10-15 PROCEDURE — G8482 FLU IMMUNIZE ORDER/ADMIN: HCPCS | Performed by: NURSE PRACTITIONER

## 2024-10-15 ASSESSMENT — ENCOUNTER SYMPTOMS
GASTROINTESTINAL NEGATIVE: 1
ABDOMINAL PAIN: 0
TROUBLE SWALLOWING: 0
ANAL BLEEDING: 0
DIARRHEA: 0
ALLERGIC/IMMUNOLOGIC NEGATIVE: 1
BLOOD IN STOOL: 0
RESPIRATORY NEGATIVE: 1

## 2024-10-15 NOTE — PROGRESS NOTES
[address] [address]    Chief Complaint   Patient presents with    Anemia     Patient referred to  for anemia. Patient was seen by Dr. Orozco, Dr. Giron, and Dr. Villavicencio. Patient is on Eliquis. Patient today denies having any GI issues. Last colonoscopy when he was 75 years. Patient does voice concerns about having a colonoscopy completed due to his age.         HPI Jordan Nicholson is a 89 y.o. old male who has a past medical history of chronic systolic heart failure, cardiomyopathy, pulmonary hypertension, cardiac amyloidosis, valvular heart disease, atrial fibrillation (on Eliquis), coronary artery disease s/p CABG x 3 (2010), hypertension, hyperlipidemia, diabetes mellitus type 2, presents as a new referral for concern for iron deficiency anemia.  He is accompanied by his daughter, Dolores who assists with HPI.  According to Dolores, Jordan has been seeing a cardiologist at the [facility], was found to have a hemoglobin of 8.5.  Jordan has been started on oral iron supplementation, Eliquis was discontinued, but restarted at 2.5 mg twice daily.  Aspirin is currently on hold.  Jordan denies rectal bleeding, bloody stools, melena, hematemesis.  Review of chronic medications, Celebrex is  listed, Jordan voices that he is no longer taking.  Jordan's last colonoscopy was at 75 years of age, Jordan voices that it was normal.  Jordan voices concerns with endoscopy due to his age.     Family history of colon cancer: No  Blood in stool: No.  No melena  Unintentional weight loss: No  Abdominal pain: No  Prior colonoscopy: Yes  Prior EGD: No  Constipation history: No  Number of bowel movements a day: 1-2  Change in stool caliber: No  History of GERD or Acid Reflux: No      Lab Results   Component Value Date    LVEF 55 02/11/2022       JEWEL Complete 4/03/2024:    Left Ventricle: The EF by visual approximation is 55 - 60%.    Aortic Valve: Moderately thickened cusp. Moderately

## 2024-10-15 NOTE — PATIENT INSTRUCTIONS
SURVEY:    You may be receiving a survey from [facility] regarding your visit today.    You may get this in the mail, through your MyChart, or in your email.     Please complete the survey to enable us to provide the highest quality of care to you and your family.    If you cannot score us a very good (5 Stars) on any question, please call the office to discuss how we could of made your experience exceptional.    Thank you!    MD Dr. Blank Salgado, DO  Dr. Zackary Pal, DO    Dr. Vito Garibay, DO    MD Yvonne Duarte, APRN-CAREN Carney, PATRICIA Byrnes LPN Jena Adams, MA Emily Akers, MA    Phone: [phone]  Fax: [phone]    Office Hours:   M-TH 8-5, F: 8-12

## 2024-10-28 ENCOUNTER — OFFICE VISIT (OUTPATIENT)
Dept: ONCOLOGY | Age: 89
End: 2024-10-28
Payer: MEDICARE

## 2024-10-28 VITALS
BODY MASS INDEX: 23.62 KG/M2 | RESPIRATION RATE: 18 BRPM | HEIGHT: 70 IN | HEART RATE: 57 BPM | SYSTOLIC BLOOD PRESSURE: 104 MMHG | DIASTOLIC BLOOD PRESSURE: 51 MMHG | TEMPERATURE: 96.4 F | WEIGHT: 165 LBS

## 2024-10-28 DIAGNOSIS — D50.0 IRON DEFICIENCY ANEMIA DUE TO CHRONIC BLOOD LOSS: ICD-10-CM

## 2024-10-28 DIAGNOSIS — D64.9 NORMOCYTIC ANEMIA: Primary | ICD-10-CM

## 2024-10-28 DIAGNOSIS — I43 CARDIAC AMYLOIDOSIS (HCC): ICD-10-CM

## 2024-10-28 DIAGNOSIS — D47.2 GAMMOPATHY: ICD-10-CM

## 2024-10-28 DIAGNOSIS — E85.4 CARDIAC AMYLOIDOSIS (HCC): ICD-10-CM

## 2024-10-28 PROCEDURE — 1126F AMNT PAIN NOTED NONE PRSNT: CPT | Performed by: INTERNAL MEDICINE

## 2024-10-28 PROCEDURE — G8427 DOCREV CUR MEDS BY ELIG CLIN: HCPCS | Performed by: INTERNAL MEDICINE

## 2024-10-28 PROCEDURE — 99214 OFFICE O/P EST MOD 30 MIN: CPT | Performed by: INTERNAL MEDICINE

## 2024-10-28 PROCEDURE — G8482 FLU IMMUNIZE ORDER/ADMIN: HCPCS | Performed by: INTERNAL MEDICINE

## 2024-10-28 PROCEDURE — 1123F ACP DISCUSS/DSCN MKR DOCD: CPT | Performed by: INTERNAL MEDICINE

## 2024-10-28 PROCEDURE — 1159F MED LIST DOCD IN RCRD: CPT | Performed by: INTERNAL MEDICINE

## 2024-10-28 PROCEDURE — G8420 CALC BMI NORM PARAMETERS: HCPCS | Performed by: INTERNAL MEDICINE

## 2024-10-28 PROCEDURE — 1036F TOBACCO NON-USER: CPT | Performed by: INTERNAL MEDICINE

## 2024-10-28 PROCEDURE — 99211 OFF/OP EST MAY X REQ PHY/QHP: CPT | Performed by: INTERNAL MEDICINE

## 2024-10-28 NOTE — PROGRESS NOTES
Patient ID: Jordan Nicholson, 9/28/1933, [id number], 89 y.o.  Referred by :  No ref. provider found   Reason for consultation: Normocytic anemia/iron deficiency anemia      HISTORY OF PRESENT ILLNESS:    Oncologic History:    Jordan Nicholson is a very pleasant 89 y.o. male.  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 but no quantification, kappa to lambda ratio elevated at 2.19 and urine serum protein electrophoresis was negative  Iron panel did show low saturation with normal TIBC and ferritin at 78  Reviewing kidney function recently that showed declining    Interval history  Myeloma workup is negative with repeated serum electrophoresis no M spike  Hemoglobin stable    Past Medical History:   Diagnosis Date    ACE inhibitor-aggravated angioedema 2012    Atrial fibrillation (HCC) 12/02/2021    CAD (coronary artery disease)     CHF (congestive heart failure) (HCC)     Enlarged prostate     normal cystoscopy 7/2008 Dr. Dillard    Essential hypertension     History of blood transfusion     History of Holter monitoring 2015    symptomatic PACs    Hx of echocardiogram 2015    normal    Hyperlipidemia     Osteoarthritis     Type 2 diabetes mellitus with diabetic polyneuropathy, without long-term current use of insulin (HCC)     Type II or unspecified type diabetes mellitus without mention of complication, not stated as uncontrolled        Past Surgical History:   Procedure Laterality Date    BELPHAROPTOSIS REPAIR Bilateral 1/9/2015    CARDIAC CATHETERIZATION Left 07/07/2022    left radial/MHT/ Dr. Orozco    CARDIAC PROCEDURE N/A 4/3/2024    Danielito during cath case performed by Pam,

## 2024-11-11 ENCOUNTER — HOSPITAL ENCOUNTER (OUTPATIENT)
Age: 89
Discharge: HOME | End: 2024-11-11
Payer: MEDICARE

## 2024-11-11 LAB
ANION GAP SERPL CALCULATED.3IONS-SCNC: 10 MMOL/L (ref 9–16)
BUN SERPL-MCNC: 19 MG/DL (ref 8–23)
BUN/CREAT SERPL: 16 (ref 9–20)
CALCIUM SERPL-MCNC: 9.6 MG/DL (ref 8.6–10.4)
CHLORIDE SERPL-SCNC: 97 MMOL/L (ref 98–107)
CO2 SERPL-SCNC: 30 MMOL/L (ref 20–31)
CREAT SERPL-MCNC: 1.2 MG/DL (ref 0.7–1.2)
GFR, ESTIMATED: 58 ML/MIN/1.73M2
GLUCOSE SERPL-MCNC: 199 MG/DL (ref 74–99)
POTASSIUM SERPL-SCNC: 4.3 MMOL/L (ref 3.7–5.3)
SODIUM SERPL-SCNC: 137 MMOL/L (ref 136–145)

## 2024-11-11 PROCEDURE — 80048 BASIC METABOLIC PNL TOTAL CA: CPT

## 2024-11-11 PROCEDURE — 36415 COLL VENOUS BLD VENIPUNCTURE: CPT

## 2024-11-22 ENCOUNTER — OFFICE VISIT (OUTPATIENT)
Dept: CARDIOLOGY | Age: 89
End: 2024-11-22
Payer: MEDICARE

## 2024-11-22 VITALS
SYSTOLIC BLOOD PRESSURE: 135 MMHG | BODY MASS INDEX: 23.82 KG/M2 | DIASTOLIC BLOOD PRESSURE: 64 MMHG | HEART RATE: 57 BPM | HEIGHT: 70 IN | OXYGEN SATURATION: 98 % | RESPIRATION RATE: 18 BRPM | WEIGHT: 166.4 LBS

## 2024-11-22 DIAGNOSIS — I10 ESSENTIAL HYPERTENSION: ICD-10-CM

## 2024-11-22 DIAGNOSIS — I35.0 SEVERE AORTIC STENOSIS: ICD-10-CM

## 2024-11-22 DIAGNOSIS — I25.10 ASHD (ARTERIOSCLEROTIC HEART DISEASE): ICD-10-CM

## 2024-11-22 DIAGNOSIS — E78.2 MIXED HYPERLIPIDEMIA: ICD-10-CM

## 2024-11-22 DIAGNOSIS — I50.42 CHRONIC COMBINED SYSTOLIC AND DIASTOLIC HEART FAILURE, NYHA CLASS 3 (HCC): ICD-10-CM

## 2024-11-22 DIAGNOSIS — Z79.01 CHRONIC ANTICOAGULATION: ICD-10-CM

## 2024-11-22 DIAGNOSIS — I95.1 ORTHOSTATIC HYPOTENSION: ICD-10-CM

## 2024-11-22 DIAGNOSIS — Z79.899 ON AMIODARONE THERAPY: ICD-10-CM

## 2024-11-22 DIAGNOSIS — I34.0 MODERATE MITRAL REGURGITATION: ICD-10-CM

## 2024-11-22 DIAGNOSIS — I48.0 PAF (PAROXYSMAL ATRIAL FIBRILLATION) (HCC): Primary | ICD-10-CM

## 2024-11-22 DIAGNOSIS — I10 PRIMARY HYPERTENSION: ICD-10-CM

## 2024-11-22 DIAGNOSIS — R29.898 WEAKNESS OF BOTH LOWER EXTREMITIES: ICD-10-CM

## 2024-11-22 PROCEDURE — 1123F ACP DISCUSS/DSCN MKR DOCD: CPT | Performed by: FAMILY MEDICINE

## 2024-11-22 PROCEDURE — 99214 OFFICE O/P EST MOD 30 MIN: CPT | Performed by: FAMILY MEDICINE

## 2024-11-22 PROCEDURE — G8420 CALC BMI NORM PARAMETERS: HCPCS | Performed by: FAMILY MEDICINE

## 2024-11-22 PROCEDURE — 99211 OFF/OP EST MAY X REQ PHY/QHP: CPT | Performed by: FAMILY MEDICINE

## 2024-11-22 PROCEDURE — G8482 FLU IMMUNIZE ORDER/ADMIN: HCPCS | Performed by: FAMILY MEDICINE

## 2024-11-22 PROCEDURE — G8427 DOCREV CUR MEDS BY ELIG CLIN: HCPCS | Performed by: FAMILY MEDICINE

## 2024-11-22 PROCEDURE — 1036F TOBACCO NON-USER: CPT | Performed by: FAMILY MEDICINE

## 2024-11-22 PROCEDURE — 1159F MED LIST DOCD IN RCRD: CPT | Performed by: FAMILY MEDICINE

## 2024-11-22 RX ORDER — METOPROLOL SUCCINATE 25 MG/1
12.5 TABLET, EXTENDED RELEASE ORAL DAILY
Qty: 45 TABLET | Refills: 3 | Status: SHIPPED | OUTPATIENT
Start: 2024-11-22

## 2024-11-22 NOTE — PROGRESS NOTES
I, Angelika Lechuga am scribing for and in the presence of Walter Orozco MD, MS, F.A.C.C..     Patient: Jordan Nicholson  : 1933  Primary Care Physician: Michael Giron  Today's Date: 2024    REASON FOR CONSULTATION: Atrial Fibrillation (HX:PAF pt is here for follow up he states he is doing fine, lightheaded when getting up in morning denies any falls  Denies:CP, sob, palp )    Dear Michael Giron MD,     HPI:  Mr. Nicholson is a 89 y.o. male who was admitted to the hospital for new onset atrial fibrillation. He has never had this in the past. History of 3 vessel bypass in 2010. No heart attacks. No history of weakened heart in the past. He reports following in the past with Dr. Stroud in [place] but has not seen him in over 3 years. He reports it has been awhile since he's seen them. He had echo done on 2021 which shows EF 50-55%  Mild aortic stenosis with peak and mean gradient of 17 and 10 mmHg although it looks worse on 2D imaging.Mitral annular calcification is seen with a mean gradient of 4.2 mmHg consistent with mild calcific mitral stenosis. Mild to moderate mitral regurgitation was seen. Moderate to severe tricuspid regurgitation. Moderate pulmonary hypertension with an estimated right ventricular systolic pressure of 57 mmHg. Diastolic function cannot be properly assessed due to atrial fibrillation. He also had abnormal stress on 2021 which was Equivocal study and EF 37%. He had successful cardioversion on 2021. Echocardiogram done on 2022: EF of 55% LV wall thickness is mildly increased. Severe bi-atrial enlargement. Mild to moderate mitral regurgitation. Moderate to severe tricuspid regurgitation. Moderate pulmonary hypertension with an estimated right ventricular systolic pressure of 61 mmHg. Moderate pulmonic regurgitation. Moderate diastolic dysfunction is seen. He had heart cath done on 2022  Severe four vessel disease involving the left

## 2024-12-03 ENCOUNTER — HOSPITAL ENCOUNTER (OUTPATIENT)
Age: 88
Discharge: HOME | End: 2024-12-03
Payer: MEDICARE

## 2024-12-03 DIAGNOSIS — D68.69 SECONDARY HYPERCOAGULABLE STATE (HCC): ICD-10-CM

## 2024-12-03 LAB
BASOPHILS # BLD: 0.06 K/UL (ref 0–0.2)
BASOPHILS NFR BLD: 1 % (ref 0–2)
EOSINOPHIL # BLD: 0.12 K/UL (ref 0–0.44)
EOSINOPHILS RELATIVE PERCENT: 2 % (ref 1–4)
ERYTHROCYTE [DISTWIDTH] IN BLOOD BY AUTOMATED COUNT: 18.8 % (ref 11.8–14.4)
HCT VFR BLD AUTO: 38.3 % (ref 40.7–50.3)
HGB BLD-MCNC: 11.9 G/DL (ref 13–17)
IMM GRANULOCYTES # BLD AUTO: <0.03 K/UL (ref 0–0.3)
IMM GRANULOCYTES NFR BLD: 0 %
LYMPHOCYTES NFR BLD: 0.98 K/UL (ref 1.1–3.7)
LYMPHOCYTES RELATIVE PERCENT: 15 % (ref 24–43)
MCH RBC QN AUTO: 28.5 PG (ref 25.2–33.5)
MCHC RBC AUTO-ENTMCNC: 31.1 G/DL (ref 28.4–34.8)
MCV RBC AUTO: 91.8 FL (ref 82.6–102.9)
MONOCYTES NFR BLD: 0.65 K/UL (ref 0.1–1.2)
MONOCYTES NFR BLD: 10 % (ref 3–12)
NEUTROPHILS NFR BLD: 72 % (ref 36–65)
NEUTS SEG NFR BLD: 4.69 K/UL (ref 1.5–8.1)
NRBC BLD-RTO: 0 PER 100 WBC
PLATELET # BLD AUTO: 170 K/UL (ref 138–453)
PMV BLD AUTO: 10.1 FL (ref 8.1–13.5)
RBC # BLD AUTO: 4.17 M/UL (ref 4.21–5.77)
WBC OTHER # BLD: 6.5 K/UL (ref 3.5–11.3)

## 2024-12-03 PROCEDURE — 85025 COMPLETE CBC W/AUTO DIFF WBC: CPT

## 2024-12-03 PROCEDURE — 36415 COLL VENOUS BLD VENIPUNCTURE: CPT

## 2024-12-11 ENCOUNTER — HOSPITAL ENCOUNTER (OUTPATIENT)
Age: 88
Discharge: HOME | End: 2024-12-13
Attending: FAMILY MEDICINE
Payer: MEDICARE

## 2024-12-11 VITALS
WEIGHT: 169 LBS | BODY MASS INDEX: 24.2 KG/M2 | DIASTOLIC BLOOD PRESSURE: 53 MMHG | HEIGHT: 70 IN | SYSTOLIC BLOOD PRESSURE: 113 MMHG

## 2024-12-11 DIAGNOSIS — I25.10 ASHD (ARTERIOSCLEROTIC HEART DISEASE): ICD-10-CM

## 2024-12-11 DIAGNOSIS — I10 ESSENTIAL HYPERTENSION: ICD-10-CM

## 2024-12-11 DIAGNOSIS — I50.42 CHRONIC COMBINED SYSTOLIC AND DIASTOLIC HEART FAILURE, NYHA CLASS 3 (HCC): ICD-10-CM

## 2024-12-11 DIAGNOSIS — I34.0 MODERATE MITRAL REGURGITATION: ICD-10-CM

## 2024-12-11 DIAGNOSIS — I48.0 PAF (PAROXYSMAL ATRIAL FIBRILLATION) (HCC): ICD-10-CM

## 2024-12-11 DIAGNOSIS — I95.1 ORTHOSTATIC HYPOTENSION: ICD-10-CM

## 2024-12-11 DIAGNOSIS — Z79.899 ON AMIODARONE THERAPY: ICD-10-CM

## 2024-12-11 DIAGNOSIS — I35.0 SEVERE AORTIC STENOSIS: ICD-10-CM

## 2024-12-11 DIAGNOSIS — E78.2 MIXED HYPERLIPIDEMIA: ICD-10-CM

## 2024-12-11 LAB
ECHO AO SINUS VALSALVA DIAM: 3.4 CM
ECHO AO SINUS VALSALVA INDEX: 1.75 CM/M2
ECHO AO ST JNCT DIAM: 2.5 CM
ECHO AV AREA PEAK VELOCITY: 0.8 CM2
ECHO AV AREA VTI: 0.8 CM2
ECHO AV AREA/BSA PEAK VELOCITY: 0.4 CM2/M2
ECHO AV AREA/BSA VTI: 0.4 CM2/M2
ECHO AV CUSP MM: 0.8 CM
ECHO AV MEAN GRADIENT: 14 MMHG
ECHO AV MEAN VELOCITY: 1.8 M/S
ECHO AV PEAK GRADIENT: 26 MMHG
ECHO AV PEAK VELOCITY: 2.5 M/S
ECHO AV VELOCITY RATIO: 0.28
ECHO AV VTI: 60.8 CM
ECHO BSA: 1.95 M2
ECHO EST RA PRESSURE: 8 MMHG
ECHO LA AREA 2C: 33.7 CM2
ECHO LA AREA 4C: 36.2 CM2
ECHO LA MAJOR AXIS: 7.8 CM
ECHO LA MINOR AXIS: 7.2 CM
ECHO LA VOL BP: 137 ML (ref 18–58)
ECHO LA VOL MOD A2C: 130 ML (ref 18–58)
ECHO LA VOL MOD A4C: 133 ML (ref 18–58)
ECHO LA VOL/BSA BIPLANE: 71 ML/M2 (ref 16–34)
ECHO LA VOLUME INDEX MOD A2C: 67 ML/M2 (ref 16–34)
ECHO LA VOLUME INDEX MOD A4C: 69 ML/M2 (ref 16–34)
ECHO LV E' LATERAL VELOCITY: 9.25 CM/S
ECHO LV EDV A2C: 71 ML
ECHO LV EDV A4C: 78 ML
ECHO LV EDV INDEX A4C: 40 ML/M2
ECHO LV EDV NDEX A2C: 37 ML/M2
ECHO LV EJECTION FRACTION A2C: 52 %
ECHO LV EJECTION FRACTION A4C: 48 %
ECHO LV EJECTION FRACTION BIPLANE: 48 % (ref 55–100)
ECHO LV ESV A2C: 34 ML
ECHO LV ESV A4C: 41 ML
ECHO LV ESV INDEX A2C: 18 ML/M2
ECHO LV ESV INDEX A4C: 21 ML/M2
ECHO LV FRACTIONAL SHORTENING: 32 % (ref 28–44)
ECHO LV INTERNAL DIMENSION DIASTOLE INDEX: 2.94 CM/M2
ECHO LV INTERNAL DIMENSION DIASTOLIC: 5.7 CM (ref 4.2–5.9)
ECHO LV INTERNAL DIMENSION SYSTOLIC INDEX: 2.01 CM/M2
ECHO LV INTERNAL DIMENSION SYSTOLIC: 3.9 CM
ECHO LV IVSD: 1.2 CM (ref 0.6–1)
ECHO LV MASS 2D: 288.7 G (ref 88–224)
ECHO LV MASS INDEX 2D: 148.8 G/M2 (ref 49–115)
ECHO LV POSTERIOR WALL DIASTOLIC: 1.2 CM (ref 0.6–1)
ECHO LV RELATIVE WALL THICKNESS RATIO: 0.42
ECHO LVOT AREA: 3.1 CM2
ECHO LVOT AV VTI INDEX: 0.29
ECHO LVOT DIAM: 2 CM
ECHO LVOT MEAN GRADIENT: 1 MMHG
ECHO LVOT PEAK GRADIENT: 2 MMHG
ECHO LVOT PEAK VELOCITY: 0.7 M/S
ECHO LVOT STROKE VOLUME INDEX: 28.3 ML/M2
ECHO LVOT SV: 55 ML
ECHO LVOT VTI: 17.5 CM
ECHO MV A VELOCITY: 0.48 M/S
ECHO MV AREA VTI: 1 CM2
ECHO MV E DECELERATION TIME (DT): 275 MS
ECHO MV E VELOCITY: 1.37 M/S
ECHO MV E/A RATIO: 2.85
ECHO MV E/E' LATERAL: 14.81
ECHO MV LVOT VTI INDEX: 3.23
ECHO MV MAX VELOCITY: 1.7 M/S
ECHO MV MEAN GRADIENT: 2 MMHG
ECHO MV MEAN VELOCITY: 0.6 M/S
ECHO MV PEAK GRADIENT: 11 MMHG
ECHO MV VTI: 56.5 CM
ECHO PV MAX VELOCITY: 0.7 M/S
ECHO PV PEAK GRADIENT: 2 MMHG
ECHO RIGHT VENTRICULAR SYSTOLIC PRESSURE (RVSP): 58 MMHG
ECHO TV REGURGITANT MAX VELOCITY: 3.55 M/S
ECHO TV REGURGITANT PEAK GRADIENT: 50 MMHG

## 2024-12-11 PROCEDURE — 93306 TTE W/DOPPLER COMPLETE: CPT | Performed by: INTERNAL MEDICINE

## 2024-12-11 PROCEDURE — 93306 TTE W/DOPPLER COMPLETE: CPT

## 2024-12-12 ENCOUNTER — TELEPHONE (OUTPATIENT)
Dept: CARDIOLOGY | Age: 88
End: 2024-12-12

## 2024-12-12 DIAGNOSIS — I10 ESSENTIAL HYPERTENSION: ICD-10-CM

## 2024-12-12 DIAGNOSIS — I34.0 MODERATE MITRAL REGURGITATION: ICD-10-CM

## 2024-12-12 DIAGNOSIS — I35.0 SEVERE AORTIC STENOSIS: ICD-10-CM

## 2024-12-12 DIAGNOSIS — R93.1 ABNORMAL ECHOCARDIOGRAM: Primary | ICD-10-CM

## 2024-12-12 DIAGNOSIS — I25.10 ASHD (ARTERIOSCLEROTIC HEART DISEASE): ICD-10-CM

## 2024-12-12 DIAGNOSIS — I50.42 CHRONIC COMBINED SYSTOLIC AND DIASTOLIC HEART FAILURE, NYHA CLASS 3 (HCC): ICD-10-CM

## 2024-12-12 NOTE — TELEPHONE ENCOUNTER
----- Message from Dr. Walter Orozco MD sent at 12/12/2024 12:05 AM EST -----  Let Patient know echo does appear to have worsened slightly including his heart strength as well as severity of aortic narrowing. Would recommend Dobutamine stress Echo to further assess if he is willing to better assess if he would be willing to undergo a TAVR if it was abnormal. Otherwise make appt 4-6 weeks to discuss options. THanks.    Thanks.

## 2025-01-02 ENCOUNTER — HOSPITAL ENCOUNTER (OUTPATIENT)
Age: 89
Discharge: HOME | End: 2025-01-02
Payer: MEDICARE

## 2025-01-02 DIAGNOSIS — D68.69 SECONDARY HYPERCOAGULABLE STATE (HCC): ICD-10-CM

## 2025-01-02 LAB
BASOPHILS # BLD: 0.06 K/UL (ref 0–0.2)
BASOPHILS NFR BLD: 1 % (ref 0–2)
EOSINOPHIL # BLD: 0.14 K/UL (ref 0–0.44)
EOSINOPHILS RELATIVE PERCENT: 3 % (ref 1–4)
ERYTHROCYTE [DISTWIDTH] IN BLOOD BY AUTOMATED COUNT: 16.1 % (ref 11.8–14.4)
HCT VFR BLD AUTO: 37.6 % (ref 40.7–50.3)
HGB BLD-MCNC: 12.2 G/DL (ref 13–17)
IMM GRANULOCYTES # BLD AUTO: <0.03 K/UL (ref 0–0.3)
IMM GRANULOCYTES NFR BLD: 0 %
LYMPHOCYTES NFR BLD: 1 K/UL (ref 1.1–3.7)
LYMPHOCYTES RELATIVE PERCENT: 21 % (ref 24–43)
MCH RBC QN AUTO: 30.3 PG (ref 25.2–33.5)
MCHC RBC AUTO-ENTMCNC: 32.4 G/DL (ref 28.4–34.8)
MCV RBC AUTO: 93.5 FL (ref 82.6–102.9)
MONOCYTES NFR BLD: 0.46 K/UL (ref 0.1–1.2)
MONOCYTES NFR BLD: 10 % (ref 3–12)
NEUTROPHILS NFR BLD: 65 % (ref 36–65)
NEUTS SEG NFR BLD: 3.13 K/UL (ref 1.5–8.1)
NRBC BLD-RTO: 0 PER 100 WBC
PLATELET # BLD AUTO: 153 K/UL (ref 138–453)
PMV BLD AUTO: 10.3 FL (ref 8.1–13.5)
RBC # BLD AUTO: 4.02 M/UL (ref 4.21–5.77)
WBC OTHER # BLD: 4.8 K/UL (ref 3.5–11.3)

## 2025-01-02 PROCEDURE — 36415 COLL VENOUS BLD VENIPUNCTURE: CPT

## 2025-01-02 PROCEDURE — 85025 COMPLETE CBC W/AUTO DIFF WBC: CPT

## 2025-01-03 ENCOUNTER — HOSPITAL ENCOUNTER (OUTPATIENT)
Age: 89
End: 2025-01-03
Attending: FAMILY MEDICINE
Payer: MEDICARE

## 2025-01-03 VITALS — WEIGHT: 169 LBS | BODY MASS INDEX: 24.2 KG/M2 | HEIGHT: 70 IN

## 2025-01-03 DIAGNOSIS — I25.10 ASHD (ARTERIOSCLEROTIC HEART DISEASE): ICD-10-CM

## 2025-01-03 DIAGNOSIS — I34.0 MODERATE MITRAL REGURGITATION: ICD-10-CM

## 2025-01-03 DIAGNOSIS — I10 ESSENTIAL HYPERTENSION: ICD-10-CM

## 2025-01-03 DIAGNOSIS — I35.0 SEVERE AORTIC STENOSIS: ICD-10-CM

## 2025-01-03 DIAGNOSIS — I50.42 CHRONIC COMBINED SYSTOLIC AND DIASTOLIC HEART FAILURE, NYHA CLASS 3 (HCC): ICD-10-CM

## 2025-01-03 DIAGNOSIS — R93.1 ABNORMAL ECHOCARDIOGRAM: ICD-10-CM

## 2025-01-03 PROCEDURE — 6360000002 HC RX W HCPCS: Performed by: FAMILY MEDICINE

## 2025-01-03 PROCEDURE — 93017 CV STRESS TEST TRACING ONLY: CPT

## 2025-01-03 PROCEDURE — 6360000002 HC RX W HCPCS: Performed by: INTERNAL MEDICINE

## 2025-01-03 RX ORDER — ATROPINE SULFATE 0.1 MG/ML
0.5 INJECTION INTRAVENOUS
Status: COMPLETED | OUTPATIENT
Start: 2025-01-03 | End: 2025-01-03

## 2025-01-03 RX ORDER — DOBUTAMINE HYDROCHLORIDE 200 MG/100ML
2.5-5 INJECTION INTRAVENOUS CONTINUOUS
Status: DISPENSED | OUTPATIENT
Start: 2025-01-03

## 2025-01-03 RX ADMIN — ATROPINE SULFATE 0.5 MG: 0.1 INJECTION INTRAVENOUS at 13:52

## 2025-01-03 RX ADMIN — DOBUTAMINE HYDROCHLORIDE 10 MCG/KG/MIN: 200 INJECTION INTRAVENOUS at 13:23

## 2025-01-05 LAB
ECHO AV AREA PEAK VELOCITY: 1.1 CM2
ECHO AV AREA VTI: 1.5 CM2
ECHO AV AREA/BSA PEAK VELOCITY: 0.6 CM2/M2
ECHO AV AREA/BSA VTI: 0.8 CM2/M2
ECHO AV MEAN GRADIENT: 22 MMHG
ECHO AV MEAN VELOCITY: 2.2 M/S
ECHO AV PEAK GRADIENT: 40 MMHG
ECHO AV PEAK VELOCITY: 3.2 M/S
ECHO AV VELOCITY RATIO: 0.34
ECHO AV VTI: 42.7 CM
ECHO BSA: 1.95 M2
ECHO LVOT AREA: 3.1 CM2
ECHO LVOT AV VTI INDEX: 0.46
ECHO LVOT DIAM: 2 CM
ECHO LVOT MEAN GRADIENT: 3 MMHG
ECHO LVOT PEAK GRADIENT: 5 MMHG
ECHO LVOT PEAK VELOCITY: 1.1 M/S
ECHO LVOT STROKE VOLUME INDEX: 31.9 ML/M2
ECHO LVOT SV: 61.9 ML
ECHO LVOT VTI: 19.7 CM
Lab: 26 MMHG
STRESS BASELINE DIAS BP: 60 MMHG
STRESS BASELINE HR: 50 BPM
STRESS BASELINE SYS BP: 112 MMHG
STRESS ESTIMATED WORKLOAD: 1 METS
STRESS EXERCISE DUR MIN: 15 MIN
STRESS EXERCISE DUR SEC: 23 SEC
STRESS PEAK DIAS BP: 44 MMHG
STRESS PEAK SYS BP: 138 MMHG
STRESS PERCENT HR ACHIEVED: 97 %
STRESS POST PEAK HR: 125 BPM
STRESS RATE PRESSURE PRODUCT: NORMAL BPM*MMHG
STRESS TARGET HR: 129 BPM

## 2025-01-06 ENCOUNTER — TELEPHONE (OUTPATIENT)
Dept: CARDIOLOGY | Age: 89
End: 2025-01-06

## 2025-01-06 NOTE — TELEPHONE ENCOUNTER
----- Message from Dr. Walter Orozco MD sent at 1/5/2025 11:34 PM EST -----  Let Patient know echo still looks like he only has moderate to severe aortic stenosis so probably no intervention necessary right now. Will discuss at next visit.     Thanks.

## 2025-01-07 PROBLEM — N48.1 BALANITIS: Status: ACTIVE | Noted: 2025-01-07

## 2025-01-08 ENCOUNTER — OFFICE VISIT (OUTPATIENT)
Dept: CARDIOLOGY | Age: 89
End: 2025-01-08
Payer: MEDICARE

## 2025-01-08 VITALS
DIASTOLIC BLOOD PRESSURE: 57 MMHG | HEIGHT: 70 IN | SYSTOLIC BLOOD PRESSURE: 125 MMHG | BODY MASS INDEX: 23.16 KG/M2 | OXYGEN SATURATION: 98 % | WEIGHT: 161.8 LBS | RESPIRATION RATE: 18 BRPM | HEART RATE: 47 BPM

## 2025-01-08 DIAGNOSIS — I34.0 MODERATE MITRAL REGURGITATION: ICD-10-CM

## 2025-01-08 DIAGNOSIS — I95.1 ORTHOSTATIC HYPOTENSION: ICD-10-CM

## 2025-01-08 DIAGNOSIS — I10 ESSENTIAL HYPERTENSION: ICD-10-CM

## 2025-01-08 DIAGNOSIS — E78.2 MIXED HYPERLIPIDEMIA: ICD-10-CM

## 2025-01-08 DIAGNOSIS — I35.0 SEVERE AORTIC STENOSIS: ICD-10-CM

## 2025-01-08 DIAGNOSIS — I50.42 CHRONIC COMBINED SYSTOLIC AND DIASTOLIC HEART FAILURE, NYHA CLASS 3 (HCC): ICD-10-CM

## 2025-01-08 DIAGNOSIS — I25.10 ASHD (ARTERIOSCLEROTIC HEART DISEASE): ICD-10-CM

## 2025-01-08 DIAGNOSIS — Z79.899 ON AMIODARONE THERAPY: ICD-10-CM

## 2025-01-08 DIAGNOSIS — D50.0 IRON DEFICIENCY ANEMIA DUE TO CHRONIC BLOOD LOSS: ICD-10-CM

## 2025-01-08 DIAGNOSIS — E85.82 WILD-TYPE TRANSTHYRETIN-RELATED (ATTR) AMYLOIDOSIS (HCC): Primary | ICD-10-CM

## 2025-01-08 PROCEDURE — G8420 CALC BMI NORM PARAMETERS: HCPCS | Performed by: FAMILY MEDICINE

## 2025-01-08 PROCEDURE — 1036F TOBACCO NON-USER: CPT | Performed by: FAMILY MEDICINE

## 2025-01-08 PROCEDURE — 1123F ACP DISCUSS/DSCN MKR DOCD: CPT | Performed by: FAMILY MEDICINE

## 2025-01-08 PROCEDURE — 1159F MED LIST DOCD IN RCRD: CPT | Performed by: FAMILY MEDICINE

## 2025-01-08 PROCEDURE — 99211 OFF/OP EST MAY X REQ PHY/QHP: CPT | Performed by: FAMILY MEDICINE

## 2025-01-08 PROCEDURE — M1299 PR FLU IMMUNIZE ORDER/ADMIN: HCPCS | Performed by: FAMILY MEDICINE

## 2025-01-08 PROCEDURE — G8427 DOCREV CUR MEDS BY ELIG CLIN: HCPCS | Performed by: FAMILY MEDICINE

## 2025-01-08 PROCEDURE — 99215 OFFICE O/P EST HI 40 MIN: CPT | Performed by: FAMILY MEDICINE

## 2025-01-08 NOTE — PROGRESS NOTES
I, Angelika Lechuga am scribing for and in the presence of Walter Orozco MD, MS, F.A.C.C..     Patient: Jordan Nicholson  : 1933  Primary Care Physician: Michael Giron  Today's Date: 2025    REASON FOR CONSULTATION: Atrial Fibrillation (HX:PAF, CHF PT is here for follow up after cardiac testing he states he is doing fine, only lightheaded when getting up in mornings  Denies:CP, sob, palp )    Dear Michael Giron MD,     HPI:  Mr. Nicholson is a 89 y.o. male who was admitted to the hospital for new onset atrial fibrillation. He has never had this in the past. History of 3 vessel bypass in 2010. No heart attacks. No history of weakened heart in the past. He reports following in the past with Dr. Stroud in [place] but has not seen him in over 3 years. He reports it has been awhile since he's seen them. He had echo done on 2021 which shows EF 50-55%  Mild aortic stenosis with peak and mean gradient of 17 and 10 mmHg although it looks worse on 2D imaging.Mitral annular calcification is seen with a mean gradient of 4.2 mmHg consistent with mild calcific mitral stenosis. Mild to moderate mitral regurgitation was seen. Moderate to severe tricuspid regurgitation. Moderate pulmonary hypertension with an estimated right ventricular systolic pressure of 57 mmHg. Diastolic function cannot be properly assessed due to atrial fibrillation. He also had abnormal stress on 2021 which was Equivocal study and EF 37%. He had successful cardioversion on 2021. Echocardiogram done on 2022: EF of 55% LV wall thickness is mildly increased. Severe bi-atrial enlargement. Mild to moderate mitral regurgitation. Moderate to severe tricuspid regurgitation. Moderate pulmonary hypertension with an estimated right ventricular systolic pressure of 61 mmHg. Moderate pulmonic regurgitation. Moderate diastolic dysfunction is seen. He had heart cath done on 2022  Severe four vessel disease

## 2025-01-22 ENCOUNTER — HOSPITAL ENCOUNTER (OUTPATIENT)
Age: 89
Discharge: HOME | End: 2025-01-22
Payer: MEDICARE

## 2025-01-22 ENCOUNTER — HOSPITAL ENCOUNTER (OUTPATIENT)
Age: 89
Discharge: HOME | End: 2025-01-24
Attending: FAMILY MEDICINE
Payer: MEDICARE

## 2025-01-22 DIAGNOSIS — D47.2 GAMMOPATHY: ICD-10-CM

## 2025-01-22 DIAGNOSIS — I50.42 CHRONIC COMBINED SYSTOLIC AND DIASTOLIC HEART FAILURE, NYHA CLASS 3 (HCC): ICD-10-CM

## 2025-01-22 DIAGNOSIS — D50.0 IRON DEFICIENCY ANEMIA DUE TO CHRONIC BLOOD LOSS: ICD-10-CM

## 2025-01-22 DIAGNOSIS — I95.1 ORTHOSTATIC HYPOTENSION: ICD-10-CM

## 2025-01-22 DIAGNOSIS — I10 ESSENTIAL HYPERTENSION: ICD-10-CM

## 2025-01-22 DIAGNOSIS — I35.0 SEVERE AORTIC STENOSIS: ICD-10-CM

## 2025-01-22 DIAGNOSIS — I34.0 MODERATE MITRAL REGURGITATION: ICD-10-CM

## 2025-01-22 DIAGNOSIS — D64.9 NORMOCYTIC ANEMIA: ICD-10-CM

## 2025-01-22 DIAGNOSIS — E85.82 WILD-TYPE TRANSTHYRETIN-RELATED (ATTR) AMYLOIDOSIS (HCC): ICD-10-CM

## 2025-01-22 DIAGNOSIS — I25.10 ASHD (ARTERIOSCLEROTIC HEART DISEASE): ICD-10-CM

## 2025-01-22 DIAGNOSIS — E78.2 MIXED HYPERLIPIDEMIA: ICD-10-CM

## 2025-01-22 DIAGNOSIS — Z79.899 ON AMIODARONE THERAPY: ICD-10-CM

## 2025-01-22 LAB
ALBUMIN SERPL-MCNC: 4.5 G/DL (ref 3.5–5.2)
ALBUMIN/GLOB SERPL: 1.5 {RATIO} (ref 1–2.5)
ALP SERPL-CCNC: 106 U/L (ref 40–129)
ALT SERPL-CCNC: 32 U/L (ref 10–50)
ANION GAP SERPL CALCULATED.3IONS-SCNC: 15 MMOL/L (ref 9–16)
AST SERPL-CCNC: 31 U/L (ref 10–50)
BASOPHILS # BLD: 0.04 K/UL (ref 0–0.2)
BASOPHILS NFR BLD: 1 % (ref 0–2)
BILIRUB SERPL-MCNC: 0.5 MG/DL (ref 0–1.2)
BUN SERPL-MCNC: 25 MG/DL (ref 8–23)
BUN/CREAT SERPL: 21 (ref 9–20)
CALCIUM SERPL-MCNC: 9.7 MG/DL (ref 8.6–10.4)
CHLORIDE SERPL-SCNC: 103 MMOL/L (ref 98–107)
CO2 SERPL-SCNC: 23 MMOL/L (ref 20–31)
CREAT SERPL-MCNC: 1.2 MG/DL (ref 0.7–1.2)
EOSINOPHIL # BLD: 0.14 K/UL (ref 0–0.44)
EOSINOPHILS RELATIVE PERCENT: 2 % (ref 1–4)
ERYTHROCYTE [DISTWIDTH] IN BLOOD BY AUTOMATED COUNT: 15.8 % (ref 11.8–14.4)
FREE KAPPA/LAMBDA RATIO: 1.89 (ref 0.22–1.74)
GFR, ESTIMATED: 58 ML/MIN/1.73M2
GLUCOSE SERPL-MCNC: 137 MG/DL (ref 74–99)
HCT VFR BLD AUTO: 42.2 % (ref 40.7–50.3)
HGB BLD-MCNC: 13.5 G/DL (ref 13–17)
IMM GRANULOCYTES # BLD AUTO: <0.03 K/UL (ref 0–0.3)
IMM GRANULOCYTES NFR BLD: 0 %
KAPPA LC FREE SER-MCNC: 47.1 MG/L
LAMBDA LC FREE SERPL-MCNC: 24.9 MG/L (ref 4.2–27.7)
LYMPHOCYTES NFR BLD: 1.37 K/UL (ref 1.1–3.7)
LYMPHOCYTES RELATIVE PERCENT: 23 % (ref 24–43)
MCH RBC QN AUTO: 31 PG (ref 25.2–33.5)
MCHC RBC AUTO-ENTMCNC: 32 G/DL (ref 28.4–34.8)
MCV RBC AUTO: 96.8 FL (ref 82.6–102.9)
MONOCYTES NFR BLD: 0.61 K/UL (ref 0.1–1.2)
MONOCYTES NFR BLD: 10 % (ref 3–12)
NEUTROPHILS NFR BLD: 64 % (ref 36–65)
NEUTS SEG NFR BLD: 3.76 K/UL (ref 1.5–8.1)
NRBC BLD-RTO: 0 PER 100 WBC
PLATELET # BLD AUTO: 181 K/UL (ref 138–453)
PMV BLD AUTO: 9.8 FL (ref 8.1–13.5)
POTASSIUM SERPL-SCNC: 5 MMOL/L (ref 3.7–5.3)
PROT SERPL-MCNC: 7.5 G/DL (ref 6.6–8.7)
RBC # BLD AUTO: 4.36 M/UL (ref 4.21–5.77)
SODIUM SERPL-SCNC: 141 MMOL/L (ref 136–145)
STRESS BASELINE DIAS BP: 68 MMHG
STRESS BASELINE HR: 57 BPM
STRESS BASELINE SYS BP: 132 MMHG
STRESS ESTIMATED WORKLOAD: 4.6 METS
STRESS EXERCISE DUR MIN: 8 MIN
STRESS EXERCISE DUR SEC: 14 SEC
STRESS PEAK DIAS BP: 72 MMHG
STRESS PEAK SYS BP: 148 MMHG
STRESS PERCENT HR ACHIEVED: 78 %
STRESS POST PEAK HR: 100 BPM
STRESS RATE PRESSURE PRODUCT: NORMAL BPM*MMHG
STRESS TARGET HR: 129 BPM
WBC OTHER # BLD: 5.9 K/UL (ref 3.5–11.3)

## 2025-01-22 PROCEDURE — 83540 ASSAY OF IRON: CPT

## 2025-01-22 PROCEDURE — 93016 CV STRESS TEST SUPVJ ONLY: CPT | Performed by: INTERNAL MEDICINE

## 2025-01-22 PROCEDURE — 85025 COMPLETE CBC W/AUTO DIFF WBC: CPT

## 2025-01-22 PROCEDURE — 82784 ASSAY IGA/IGD/IGG/IGM EACH: CPT

## 2025-01-22 PROCEDURE — 80053 COMPREHEN METABOLIC PANEL: CPT

## 2025-01-22 PROCEDURE — 84165 PROTEIN E-PHORESIS SERUM: CPT

## 2025-01-22 PROCEDURE — 84155 ASSAY OF PROTEIN SERUM: CPT

## 2025-01-22 PROCEDURE — 93017 CV STRESS TEST TRACING ONLY: CPT

## 2025-01-22 PROCEDURE — 82728 ASSAY OF FERRITIN: CPT

## 2025-01-22 PROCEDURE — 83521 IG LIGHT CHAINS FREE EACH: CPT

## 2025-01-22 PROCEDURE — 83550 IRON BINDING TEST: CPT

## 2025-01-22 PROCEDURE — 93018 CV STRESS TEST I&R ONLY: CPT | Performed by: INTERNAL MEDICINE

## 2025-01-23 LAB
ALBUMIN PERCENT: 60 % (ref 45–65)
ALBUMIN SERPL-MCNC: 4.3 G/DL (ref 3.2–5.2)
ALPHA 2 PERCENT: 13 % (ref 6–13)
ALPHA1 GLOB SERPL ELPH-MCNC: 0.4 G/DL (ref 0.1–0.4)
ALPHA1 GLOB SERPL ELPH-MCNC: 5 % (ref 3–6)
ALPHA2 GLOB SERPL ELPH-MCNC: 0.9 G/DL (ref 0.5–0.9)
B-GLOBULIN SERPL ELPH-MCNC: 0.7 G/DL (ref 0.5–1.1)
B-GLOBULIN SERPL ELPH-MCNC: 10 % (ref 11–19)
FERRITIN SERPL-MCNC: 59 NG/ML
GAMMA GLOB SERPL ELPH-MCNC: 0.8 G/DL (ref 0.5–1.5)
GAMMA GLOBULIN %: 12 % (ref 9–20)
IGA SERPL-MCNC: 169 MG/DL (ref 70–400)
IGG SERPL-MCNC: 950 MG/DL (ref 700–1600)
IGM SERPL-MCNC: 94 MG/DL (ref 40–230)
IRON SATN MFR SERPL: 19 % (ref 20–55)
IRON SERPL-MCNC: 60 UG/DL (ref 61–157)
PATHOLOGIST: ABNORMAL
PROT PATTERN SERPL ELPH-IMP: ABNORMAL
PROT SERPL-MCNC: 7.1 G/DL (ref 6.6–8.7)
TIBC SERPL-MCNC: 309 UG/DL (ref 250–450)
TOTAL PROT. SUM,%: 100 % (ref 98–102)
TOTAL PROT. SUM: 7.1 G/DL (ref 6.3–8.2)
UNSATURATED IRON BINDING CAPACITY: 249 UG/DL (ref 112–347)

## 2025-01-24 ENCOUNTER — TELEPHONE (OUTPATIENT)
Dept: CARDIOLOGY | Age: 89
End: 2025-01-24

## 2025-01-24 NOTE — TELEPHONE ENCOUNTER
----- Message from Dr. Walter Orozco MD sent at 1/23/2025 11:36 PM EST -----  Let Patient know stress test results were good and it does not appears the he needs or would benefit from a pacemaker at this time. Will discuss at next visit.     Thanks.

## 2025-01-30 ENCOUNTER — OFFICE VISIT (OUTPATIENT)
Dept: ONCOLOGY | Age: 89
End: 2025-01-30
Payer: MEDICARE

## 2025-01-30 VITALS
DIASTOLIC BLOOD PRESSURE: 53 MMHG | HEIGHT: 70 IN | SYSTOLIC BLOOD PRESSURE: 111 MMHG | TEMPERATURE: 96.8 F | BODY MASS INDEX: 23.05 KG/M2 | RESPIRATION RATE: 18 BRPM | HEART RATE: 49 BPM | WEIGHT: 161 LBS

## 2025-01-30 DIAGNOSIS — I48.0 PAF (PAROXYSMAL ATRIAL FIBRILLATION) (HCC): ICD-10-CM

## 2025-01-30 DIAGNOSIS — E85.4 CARDIAC AMYLOIDOSIS (HCC): ICD-10-CM

## 2025-01-30 DIAGNOSIS — D64.9 NORMOCYTIC ANEMIA: ICD-10-CM

## 2025-01-30 DIAGNOSIS — D50.0 IRON DEFICIENCY ANEMIA DUE TO CHRONIC BLOOD LOSS: Primary | ICD-10-CM

## 2025-01-30 DIAGNOSIS — I43 CARDIAC AMYLOIDOSIS (HCC): ICD-10-CM

## 2025-01-30 PROCEDURE — 1036F TOBACCO NON-USER: CPT | Performed by: INTERNAL MEDICINE

## 2025-01-30 PROCEDURE — 1159F MED LIST DOCD IN RCRD: CPT | Performed by: INTERNAL MEDICINE

## 2025-01-30 PROCEDURE — 1123F ACP DISCUSS/DSCN MKR DOCD: CPT | Performed by: INTERNAL MEDICINE

## 2025-01-30 PROCEDURE — 99211 OFF/OP EST MAY X REQ PHY/QHP: CPT | Performed by: INTERNAL MEDICINE

## 2025-01-30 PROCEDURE — 99214 OFFICE O/P EST MOD 30 MIN: CPT | Performed by: INTERNAL MEDICINE

## 2025-01-30 PROCEDURE — 1126F AMNT PAIN NOTED NONE PRSNT: CPT | Performed by: INTERNAL MEDICINE

## 2025-01-30 PROCEDURE — G8420 CALC BMI NORM PARAMETERS: HCPCS | Performed by: INTERNAL MEDICINE

## 2025-01-30 PROCEDURE — G8427 DOCREV CUR MEDS BY ELIG CLIN: HCPCS | Performed by: INTERNAL MEDICINE

## 2025-01-30 NOTE — PROGRESS NOTES
Patient ID: Jordan Nicholson, 9/28/1933, [id number], 89 y.o.  Referred by :  No ref. provider found   Reason for consultation: Normocytic anemia/iron deficiency anemia      HISTORY OF PRESENT ILLNESS:    Oncologic History:    Jordan Nicholson is a very pleasant 89 y.o. male.  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      Past Medical History:   Diagnosis Date    ACE inhibitor-aggravated angioedema 2012    Atrial fibrillation (HCC) 12/02/2021    CAD (coronary artery disease)     CHF (congestive heart failure) (HCC)     Enlarged prostate     normal cystoscopy 7/2008 Dr. Dillard    Essential hypertension     History of blood transfusion     History of Holter monitoring 2015    symptomatic PACs    Hx of echocardiogram 2015    normal    Hyperlipidemia     Osteoarthritis     Type 2 diabetes mellitus with diabetic polyneuropathy, without long-term current use of insulin (HCC)     Type II or unspecified type diabetes mellitus without mention of complication, not stated as uncontrolled        Past Surgical History:   Procedure Laterality Date    BELPHAROPTOSIS REPAIR

## 2025-02-01 PROBLEM — E85.82 WILD-TYPE TRANSTHYRETIN-RELATED (ATTR) AMYLOIDOSIS (HCC): Status: ACTIVE | Noted: 2024-10-28

## 2025-02-03 PROBLEM — E10.22 CKD STAGE 3 DUE TO TYPE 1 DIABETES MELLITUS (HCC): Status: ACTIVE | Noted: 2025-02-03

## 2025-02-03 PROBLEM — N18.30 CKD STAGE 3 DUE TO TYPE 1 DIABETES MELLITUS (HCC): Status: ACTIVE | Noted: 2025-02-03

## 2025-02-04 ENCOUNTER — HOSPITAL ENCOUNTER (OUTPATIENT)
Age: 89
Discharge: HOME | End: 2025-02-04
Payer: MEDICARE

## 2025-02-04 DIAGNOSIS — E78.2 MIXED HYPERLIPIDEMIA: ICD-10-CM

## 2025-02-04 DIAGNOSIS — E11.42 TYPE 2 DIABETES MELLITUS WITH DIABETIC POLYNEUROPATHY, WITHOUT LONG-TERM CURRENT USE OF INSULIN (HCC): ICD-10-CM

## 2025-02-04 DIAGNOSIS — I48.0 PAF (PAROXYSMAL ATRIAL FIBRILLATION) (HCC): ICD-10-CM

## 2025-02-04 DIAGNOSIS — I43 CARDIAC AMYLOIDOSIS (HCC): ICD-10-CM

## 2025-02-04 DIAGNOSIS — E85.4 CARDIAC AMYLOIDOSIS (HCC): ICD-10-CM

## 2025-02-04 DIAGNOSIS — I10 ESSENTIAL HYPERTENSION: ICD-10-CM

## 2025-02-04 DIAGNOSIS — D64.9 NORMOCYTIC ANEMIA: ICD-10-CM

## 2025-02-04 LAB
ALBUMIN SERPL-MCNC: 4.3 G/DL (ref 3.5–5.2)
ALBUMIN/GLOB SERPL: 1.7 {RATIO} (ref 1–2.5)
ALP SERPL-CCNC: 74 U/L (ref 40–129)
ALT SERPL-CCNC: 34 U/L (ref 10–50)
ANION GAP SERPL CALCULATED.3IONS-SCNC: 9 MMOL/L (ref 9–16)
AST SERPL-CCNC: 38 U/L (ref 10–50)
BASOPHILS # BLD: 0.07 K/UL (ref 0–0.2)
BASOPHILS NFR BLD: 1 % (ref 0–2)
BILIRUB SERPL-MCNC: 0.7 MG/DL (ref 0–1.2)
BUN SERPL-MCNC: 22 MG/DL (ref 8–23)
BUN/CREAT SERPL: 18 (ref 9–20)
CALCIUM SERPL-MCNC: 9.3 MG/DL (ref 8.6–10.4)
CHLORIDE SERPL-SCNC: 102 MMOL/L (ref 98–107)
CHOLEST SERPL-MCNC: 112 MG/DL (ref 0–199)
CHOLESTEROL/HDL RATIO: 3.4
CO2 SERPL-SCNC: 29 MMOL/L (ref 20–31)
CREAT SERPL-MCNC: 1.2 MG/DL (ref 0.7–1.2)
CREAT UR-MCNC: 50.3 MG/DL (ref 39–259)
EOSINOPHIL # BLD: 0.16 K/UL (ref 0–0.44)
EOSINOPHILS RELATIVE PERCENT: 3 % (ref 1–4)
ERYTHROCYTE [DISTWIDTH] IN BLOOD BY AUTOMATED COUNT: 15.3 % (ref 11.8–14.4)
EST. AVERAGE GLUCOSE BLD GHB EST-MCNC: 157 MG/DL
GFR, ESTIMATED: 59 ML/MIN/1.73M2
GLUCOSE SERPL-MCNC: 105 MG/DL (ref 74–99)
HBA1C MFR BLD: 7.1 % (ref 4–6)
HCT VFR BLD AUTO: 40 % (ref 40.7–50.3)
HDLC SERPL-MCNC: 33 MG/DL
HGB BLD-MCNC: 13.2 G/DL (ref 13–17)
IMM GRANULOCYTES # BLD AUTO: <0.03 K/UL (ref 0–0.3)
IMM GRANULOCYTES NFR BLD: 0 %
LDLC SERPL CALC-MCNC: 54 MG/DL (ref 0–100)
LYMPHOCYTES NFR BLD: 0.94 K/UL (ref 1.1–3.7)
LYMPHOCYTES RELATIVE PERCENT: 18 % (ref 24–43)
MCH RBC QN AUTO: 31.4 PG (ref 25.2–33.5)
MCHC RBC AUTO-ENTMCNC: 33 G/DL (ref 28.4–34.8)
MCV RBC AUTO: 95.2 FL (ref 82.6–102.9)
MONOCYTES NFR BLD: 0.58 K/UL (ref 0.1–1.2)
MONOCYTES NFR BLD: 11 % (ref 3–12)
NEUTROPHILS NFR BLD: 67 % (ref 36–65)
NEUTS SEG NFR BLD: 3.5 K/UL (ref 1.5–8.1)
NRBC BLD-RTO: 0 PER 100 WBC
PLATELET # BLD AUTO: 151 K/UL (ref 138–453)
PMV BLD AUTO: 10 FL (ref 8.1–13.5)
POTASSIUM SERPL-SCNC: 4.8 MMOL/L (ref 3.7–5.3)
PROT SERPL-MCNC: 6.8 G/DL (ref 6.6–8.7)
RBC # BLD AUTO: 4.2 M/UL (ref 4.21–5.77)
SODIUM SERPL-SCNC: 140 MMOL/L (ref 136–145)
TOTAL PROTEIN, URINE: 26 MG/DL
TRIGL SERPL-MCNC: 126 MG/DL (ref 0–149)
URINE TOTAL PROTEIN CREATININE RATIO: 0.52 (ref 0–0.2)
VLDLC SERPL CALC-MCNC: 25 MG/DL (ref 1–30)
WBC OTHER # BLD: 5.3 K/UL (ref 3.5–11.3)

## 2025-02-04 PROCEDURE — 80053 COMPREHEN METABOLIC PANEL: CPT

## 2025-02-04 PROCEDURE — 80061 LIPID PANEL: CPT

## 2025-02-04 PROCEDURE — 82570 ASSAY OF URINE CREATININE: CPT

## 2025-02-04 PROCEDURE — 83036 HEMOGLOBIN GLYCOSYLATED A1C: CPT

## 2025-02-04 PROCEDURE — 84156 ASSAY OF PROTEIN URINE: CPT

## 2025-02-04 PROCEDURE — 85025 COMPLETE CBC W/AUTO DIFF WBC: CPT

## 2025-02-04 PROCEDURE — 36415 COLL VENOUS BLD VENIPUNCTURE: CPT

## 2025-02-11 DIAGNOSIS — I25.10 ASHD (ARTERIOSCLEROTIC HEART DISEASE): ICD-10-CM

## 2025-02-11 DIAGNOSIS — I48.0 PAF (PAROXYSMAL ATRIAL FIBRILLATION) (HCC): ICD-10-CM

## 2025-02-11 DIAGNOSIS — Z79.01 CHRONIC ANTICOAGULATION: ICD-10-CM

## 2025-02-11 DIAGNOSIS — I10 PRIMARY HYPERTENSION: ICD-10-CM

## 2025-02-11 DIAGNOSIS — E78.2 MIXED HYPERLIPIDEMIA: ICD-10-CM

## 2025-02-11 DIAGNOSIS — Z79.899 ON AMIODARONE THERAPY: ICD-10-CM

## 2025-02-11 DIAGNOSIS — I95.1 ORTHOSTATIC HYPOTENSION: ICD-10-CM

## 2025-02-11 DIAGNOSIS — R29.898 WEAKNESS OF BOTH LOWER EXTREMITIES: ICD-10-CM

## 2025-02-11 RX ORDER — AMIODARONE HYDROCHLORIDE 200 MG/1
200 TABLET ORAL DAILY
Qty: 90 TABLET | Refills: 3 | Status: SHIPPED | OUTPATIENT
Start: 2025-02-11

## 2025-03-23 DIAGNOSIS — I48.0 PAF (PAROXYSMAL ATRIAL FIBRILLATION) (HCC): ICD-10-CM

## 2025-03-23 DIAGNOSIS — Z79.01 CHRONIC ANTICOAGULATION: ICD-10-CM

## 2025-03-23 DIAGNOSIS — E78.2 MIXED HYPERLIPIDEMIA: ICD-10-CM

## 2025-03-23 DIAGNOSIS — I10 ESSENTIAL HYPERTENSION: ICD-10-CM

## 2025-03-23 DIAGNOSIS — I07.1 SEVERE TRICUSPID REGURGITATION: ICD-10-CM

## 2025-03-23 DIAGNOSIS — Z79.899 ON AMIODARONE THERAPY: ICD-10-CM

## 2025-03-23 DIAGNOSIS — I95.1 ORTHOSTATIC HYPOTENSION: ICD-10-CM

## 2025-03-23 DIAGNOSIS — I50.42 CHRONIC COMBINED SYSTOLIC AND DIASTOLIC CONGESTIVE HEART FAILURE (HCC): ICD-10-CM

## 2025-03-23 DIAGNOSIS — I25.10 ASHD (ARTERIOSCLEROTIC HEART DISEASE): ICD-10-CM

## 2025-03-25 RX ORDER — FUROSEMIDE 20 MG/1
TABLET ORAL
Qty: 90 TABLET | Refills: 3 | OUTPATIENT
Start: 2025-03-25

## 2025-04-09 DIAGNOSIS — Z79.899 ON AMIODARONE THERAPY: ICD-10-CM

## 2025-04-09 DIAGNOSIS — I10 ESSENTIAL HYPERTENSION: ICD-10-CM

## 2025-04-09 DIAGNOSIS — I25.10 ASHD (ARTERIOSCLEROTIC HEART DISEASE): ICD-10-CM

## 2025-04-09 DIAGNOSIS — I50.42 CHRONIC COMBINED SYSTOLIC AND DIASTOLIC CONGESTIVE HEART FAILURE (HCC): ICD-10-CM

## 2025-04-09 DIAGNOSIS — I48.0 PAF (PAROXYSMAL ATRIAL FIBRILLATION) (HCC): ICD-10-CM

## 2025-04-09 DIAGNOSIS — Z79.01 CHRONIC ANTICOAGULATION: ICD-10-CM

## 2025-04-09 DIAGNOSIS — I95.1 ORTHOSTATIC HYPOTENSION: ICD-10-CM

## 2025-04-09 DIAGNOSIS — E78.2 MIXED HYPERLIPIDEMIA: ICD-10-CM

## 2025-04-09 DIAGNOSIS — I07.1 SEVERE TRICUSPID REGURGITATION: ICD-10-CM

## 2025-04-09 RX ORDER — SACUBITRIL AND VALSARTAN 24; 26 MG/1; MG/1
1 TABLET, FILM COATED ORAL 2 TIMES DAILY
Qty: 180 TABLET | Refills: 3 | Status: SHIPPED | OUTPATIENT
Start: 2025-04-09

## 2025-04-24 DIAGNOSIS — Z79.899 ON AMIODARONE THERAPY: ICD-10-CM

## 2025-04-24 DIAGNOSIS — Z79.01 CHRONIC ANTICOAGULATION: ICD-10-CM

## 2025-04-24 DIAGNOSIS — I25.10 ASHD (ARTERIOSCLEROTIC HEART DISEASE): ICD-10-CM

## 2025-04-24 DIAGNOSIS — I48.0 PAF (PAROXYSMAL ATRIAL FIBRILLATION) (HCC): Primary | ICD-10-CM

## 2025-05-12 DIAGNOSIS — I10 ESSENTIAL HYPERTENSION: ICD-10-CM

## 2025-05-12 DIAGNOSIS — E78.2 MIXED HYPERLIPIDEMIA: ICD-10-CM

## 2025-05-12 DIAGNOSIS — Z79.01 CHRONIC ANTICOAGULATION: ICD-10-CM

## 2025-05-12 DIAGNOSIS — I50.42 CHRONIC COMBINED SYSTOLIC AND DIASTOLIC CONGESTIVE HEART FAILURE (HCC): ICD-10-CM

## 2025-05-12 DIAGNOSIS — I95.1 ORTHOSTATIC HYPOTENSION: ICD-10-CM

## 2025-05-12 DIAGNOSIS — Z79.899 ON AMIODARONE THERAPY: ICD-10-CM

## 2025-05-12 DIAGNOSIS — I48.0 PAF (PAROXYSMAL ATRIAL FIBRILLATION) (HCC): ICD-10-CM

## 2025-05-12 DIAGNOSIS — I25.10 ASHD (ARTERIOSCLEROTIC HEART DISEASE): ICD-10-CM

## 2025-05-12 DIAGNOSIS — I07.1 SEVERE TRICUSPID REGURGITATION: ICD-10-CM

## 2025-05-12 RX ORDER — FUROSEMIDE 20 MG/1
TABLET ORAL
Qty: 90 TABLET | Refills: 3 | OUTPATIENT
Start: 2025-05-12

## 2025-06-21 DIAGNOSIS — I07.1 SEVERE TRICUSPID REGURGITATION: ICD-10-CM

## 2025-06-21 DIAGNOSIS — I10 ESSENTIAL HYPERTENSION: ICD-10-CM

## 2025-06-21 DIAGNOSIS — I95.1 ORTHOSTATIC HYPOTENSION: ICD-10-CM

## 2025-06-21 DIAGNOSIS — Z79.899 ON AMIODARONE THERAPY: ICD-10-CM

## 2025-06-21 DIAGNOSIS — I48.0 PAF (PAROXYSMAL ATRIAL FIBRILLATION) (HCC): ICD-10-CM

## 2025-06-21 DIAGNOSIS — Z79.01 CHRONIC ANTICOAGULATION: ICD-10-CM

## 2025-06-21 DIAGNOSIS — E78.2 MIXED HYPERLIPIDEMIA: ICD-10-CM

## 2025-06-21 DIAGNOSIS — I50.42 CHRONIC COMBINED SYSTOLIC AND DIASTOLIC CONGESTIVE HEART FAILURE (HCC): ICD-10-CM

## 2025-06-21 DIAGNOSIS — I25.10 ASHD (ARTERIOSCLEROTIC HEART DISEASE): ICD-10-CM

## 2025-06-23 DIAGNOSIS — Z79.899 ON AMIODARONE THERAPY: ICD-10-CM

## 2025-06-23 DIAGNOSIS — I48.0 PAF (PAROXYSMAL ATRIAL FIBRILLATION) (HCC): ICD-10-CM

## 2025-06-23 DIAGNOSIS — Z79.01 CHRONIC ANTICOAGULATION: ICD-10-CM

## 2025-06-23 DIAGNOSIS — I25.10 ASHD (ARTERIOSCLEROTIC HEART DISEASE): ICD-10-CM

## 2025-06-23 RX ORDER — FUROSEMIDE 20 MG/1
TABLET ORAL
Qty: 90 TABLET | Refills: 3 | OUTPATIENT
Start: 2025-06-23

## 2025-07-17 ENCOUNTER — HOSPITAL ENCOUNTER (OUTPATIENT)
Age: 89
Discharge: HOME | End: 2025-07-17
Payer: MEDICARE

## 2025-07-17 DIAGNOSIS — D64.9 NORMOCYTIC ANEMIA: ICD-10-CM

## 2025-07-17 DIAGNOSIS — E85.4 CARDIAC AMYLOIDOSIS (HCC): ICD-10-CM

## 2025-07-17 DIAGNOSIS — D50.0 IRON DEFICIENCY ANEMIA DUE TO CHRONIC BLOOD LOSS: Primary | ICD-10-CM

## 2025-07-17 DIAGNOSIS — I48.0 PAF (PAROXYSMAL ATRIAL FIBRILLATION) (HCC): ICD-10-CM

## 2025-07-17 DIAGNOSIS — I43 CARDIAC AMYLOIDOSIS (HCC): ICD-10-CM

## 2025-07-17 DIAGNOSIS — D50.0 IRON DEFICIENCY ANEMIA DUE TO CHRONIC BLOOD LOSS: ICD-10-CM

## 2025-07-17 LAB
ALBUMIN SERPL-MCNC: 4.4 G/DL (ref 3.5–5.2)
ALBUMIN/GLOB SERPL: 1.7 {RATIO} (ref 1–2.5)
ALP SERPL-CCNC: 85 U/L (ref 40–129)
ALT SERPL-CCNC: 25 U/L (ref 10–50)
ANION GAP SERPL CALCULATED.3IONS-SCNC: 12 MMOL/L (ref 9–16)
AST SERPL-CCNC: 31 U/L (ref 10–50)
BASOPHILS # BLD: 0.05 K/UL (ref 0–0.2)
BASOPHILS NFR BLD: 1 % (ref 0–2)
BILIRUB SERPL-MCNC: 0.5 MG/DL (ref 0–1.2)
BUN SERPL-MCNC: 25 MG/DL (ref 8–23)
BUN/CREAT SERPL: 18 (ref 9–20)
CALCIUM SERPL-MCNC: 9.6 MG/DL (ref 8.6–10.4)
CHLORIDE SERPL-SCNC: 98 MMOL/L (ref 98–107)
CO2 SERPL-SCNC: 29 MMOL/L (ref 20–31)
CREAT SERPL-MCNC: 1.4 MG/DL (ref 0.7–1.2)
EOSINOPHIL # BLD: 0.12 K/UL (ref 0–0.44)
EOSINOPHILS RELATIVE PERCENT: 2 % (ref 1–4)
ERYTHROCYTE [DISTWIDTH] IN BLOOD BY AUTOMATED COUNT: 14.5 % (ref 11.8–14.4)
FERRITIN SERPL-MCNC: 79 NG/ML
GFR, ESTIMATED: 48 ML/MIN/1.73M2
GLUCOSE SERPL-MCNC: 136 MG/DL (ref 74–99)
HCT VFR BLD AUTO: 41.6 % (ref 40.7–50.3)
HGB BLD-MCNC: 13.2 G/DL (ref 13–17)
IMM GRANULOCYTES # BLD AUTO: <0.03 K/UL (ref 0–0.3)
IMM GRANULOCYTES NFR BLD: 0 %
IRON SATN MFR SERPL: 20 % (ref 20–55)
IRON SERPL-MCNC: 55 UG/DL (ref 61–157)
LYMPHOCYTES NFR BLD: 1.03 K/UL (ref 1.1–3.7)
LYMPHOCYTES RELATIVE PERCENT: 19 % (ref 24–43)
MCH RBC QN AUTO: 31.4 PG (ref 25.2–33.5)
MCHC RBC AUTO-ENTMCNC: 31.7 G/DL (ref 28.4–34.8)
MCV RBC AUTO: 99 FL (ref 82.6–102.9)
MONOCYTES NFR BLD: 0.69 K/UL (ref 0.1–1.2)
MONOCYTES NFR BLD: 13 % (ref 3–12)
NEUTROPHILS NFR BLD: 65 % (ref 36–65)
NEUTS SEG NFR BLD: 3.57 K/UL (ref 1.5–8.1)
NRBC BLD-RTO: 0 PER 100 WBC
PLATELET # BLD AUTO: 158 K/UL (ref 138–453)
PMV BLD AUTO: 10.2 FL (ref 8.1–13.5)
POTASSIUM SERPL-SCNC: 5.3 MMOL/L (ref 3.7–5.3)
PROT SERPL-MCNC: 7 G/DL (ref 6.6–8.7)
RBC # BLD AUTO: 4.2 M/UL (ref 4.21–5.77)
SODIUM SERPL-SCNC: 139 MMOL/L (ref 136–145)
TIBC SERPL-MCNC: 276 UG/DL (ref 250–450)
UNSATURATED IRON BINDING CAPACITY: 221 UG/DL (ref 112–347)
WBC OTHER # BLD: 5.5 K/UL (ref 3.5–11.3)

## 2025-07-17 PROCEDURE — 80053 COMPREHEN METABOLIC PANEL: CPT

## 2025-07-17 PROCEDURE — 83540 ASSAY OF IRON: CPT

## 2025-07-17 PROCEDURE — 83550 IRON BINDING TEST: CPT

## 2025-07-17 PROCEDURE — 85025 COMPLETE CBC W/AUTO DIFF WBC: CPT

## 2025-07-17 PROCEDURE — 82728 ASSAY OF FERRITIN: CPT

## 2025-07-17 PROCEDURE — 36415 COLL VENOUS BLD VENIPUNCTURE: CPT

## 2025-07-21 ENCOUNTER — OFFICE VISIT (OUTPATIENT)
Dept: SURGERY | Age: 89
End: 2025-07-21
Payer: MEDICARE

## 2025-07-21 VITALS
DIASTOLIC BLOOD PRESSURE: 63 MMHG | WEIGHT: 156 LBS | HEART RATE: 54 BPM | BODY MASS INDEX: 22.38 KG/M2 | SYSTOLIC BLOOD PRESSURE: 117 MMHG

## 2025-07-21 DIAGNOSIS — L89.152 PRESSURE INJURY OF SACRAL REGION, STAGE 2 (HCC): Primary | ICD-10-CM

## 2025-07-21 PROCEDURE — G8427 DOCREV CUR MEDS BY ELIG CLIN: HCPCS | Performed by: SURGERY

## 2025-07-21 PROCEDURE — 1036F TOBACCO NON-USER: CPT | Performed by: SURGERY

## 2025-07-21 PROCEDURE — 99202 OFFICE O/P NEW SF 15 MIN: CPT | Performed by: SURGERY

## 2025-07-21 PROCEDURE — 1159F MED LIST DOCD IN RCRD: CPT | Performed by: SURGERY

## 2025-07-21 PROCEDURE — 1123F ACP DISCUSS/DSCN MKR DOCD: CPT | Performed by: SURGERY

## 2025-07-21 PROCEDURE — G8420 CALC BMI NORM PARAMETERS: HCPCS | Performed by: SURGERY

## 2025-07-21 RX ORDER — NAPHAZOLINE HCL/PHENIRAMINE .0268-.315
1 DROPS OPHTHALMIC (EYE) WEEKLY
Qty: 10 EACH | Refills: 1 | Status: SHIPPED | OUTPATIENT
Start: 2025-07-21

## 2025-07-21 NOTE — PROGRESS NOTES
Patient referred for a painful pressure ulcer on his sacrum.  Has bothered him on and off for a 1.5 years.  Has used zinc oxide and later A&D ointment with minimal success.  Has rico bothering him since December or January.    Patient is fairly active.  Has a large garden, and goes to [employer] several days a week. He lives by himself.     He has DM type II insulin dependent  On Eliquis    He has several children who are available and supportive.    Past Medical History:   Diagnosis Date    ACE inhibitor-aggravated angioedema 2012    Atrial fibrillation (HCC) 12/02/2021    CAD (coronary artery disease)     CHF (congestive heart failure) (HCC)     Enlarged prostate     normal cystoscopy 7/2008 Dr. Dillard    Essential hypertension     History of blood transfusion     History of Holter monitoring 2015    symptomatic PACs    Hx of echocardiogram 2015    normal    Hyperlipidemia     Osteoarthritis     Type 2 diabetes mellitus with diabetic polyneuropathy, without long-term current use of insulin (HCC)     Type II or unspecified type diabetes mellitus without mention of complication, not stated as uncontrolled      Past Surgical History:   Procedure Laterality Date    BELPHAROPTOSIS REPAIR Bilateral 1/9/2015    CARDIAC CATHETERIZATION Left 07/07/2022    left radial/[facility]/ Dr. Orozco    CARDIAC PROCEDURE N/A 4/3/2024    Danielito during cath case performed by Walter Orozco MD at [facility] CARDIAC CATH/IR LAB    CARDIOVASCULAR STRESS TEST  4/2010    Abnormal    COLONOSCOPY  11/2009    Normal    CORONARY ARTERY BYPASS GRAFT  2010    CYSTOURETHROSCOPY  7/10/08    CYSTOURETHROSCOPY  10/92    EYE SURGERY  09/2013    bilateral cataracts    EYE SURGERY Bilateral 01/09/2015    FRACTURE SURGERY  1989    Right arm/leg, pelvis, Left ankle, dislocated hip     PROSTATE SURGERY  10/92    biopsy     Current Outpatient Medications on File Prior to Visit   Medication Sig Dispense Refill    ofloxacin (OCUFLOX) 0.3 % solution Place 1 drop into the right eye 4

## 2025-07-21 NOTE — PATIENT INSTRUCTIONS
SURVEY:    You may be receiving a survey from [email or web address] regarding your visit today.    You may get this in the mail, through your MyChart, or in your email.     Please complete the survey to enable us to provide the highest quality of care to you and your family.    If you cannot score us a very good (5 Stars) on any question, please call the office to discuss how we could of made your experience exceptional.    Thank you!    General Surgery    MD Dr. Blank Salgado, DO  Dr. Zackary Pal, DO  Yvonen Nicholson, SAGAR-CNP    Pain Mgmt.  Dr. Vito Garibay, DO  CAREN Iyer, PATRICIA Byrnes LPN Jena Adams, MA Emily Akers, MA    Phone: [phone]  Fax: [phone]    Office Hours:   M-TH 8-5, F: 8-12

## 2025-07-24 ENCOUNTER — OFFICE VISIT (OUTPATIENT)
Dept: ONCOLOGY | Age: 89
End: 2025-07-24
Payer: MEDICARE

## 2025-07-24 VITALS
SYSTOLIC BLOOD PRESSURE: 127 MMHG | TEMPERATURE: 97.2 F | BODY MASS INDEX: 22.48 KG/M2 | HEART RATE: 57 BPM | DIASTOLIC BLOOD PRESSURE: 54 MMHG | HEIGHT: 70 IN | WEIGHT: 157 LBS | RESPIRATION RATE: 18 BRPM

## 2025-07-24 DIAGNOSIS — D50.0 IRON DEFICIENCY ANEMIA DUE TO CHRONIC BLOOD LOSS: ICD-10-CM

## 2025-07-24 DIAGNOSIS — I48.0 PAF (PAROXYSMAL ATRIAL FIBRILLATION) (HCC): Primary | ICD-10-CM

## 2025-07-24 DIAGNOSIS — E10.22 CKD STAGE 3 DUE TO TYPE 1 DIABETES MELLITUS (HCC): ICD-10-CM

## 2025-07-24 DIAGNOSIS — N18.30 CKD STAGE 3 DUE TO TYPE 1 DIABETES MELLITUS (HCC): ICD-10-CM

## 2025-07-24 PROCEDURE — 1126F AMNT PAIN NOTED NONE PRSNT: CPT | Performed by: INTERNAL MEDICINE

## 2025-07-24 PROCEDURE — G8420 CALC BMI NORM PARAMETERS: HCPCS | Performed by: INTERNAL MEDICINE

## 2025-07-24 PROCEDURE — G8427 DOCREV CUR MEDS BY ELIG CLIN: HCPCS | Performed by: INTERNAL MEDICINE

## 2025-07-24 PROCEDURE — 99214 OFFICE O/P EST MOD 30 MIN: CPT | Performed by: INTERNAL MEDICINE

## 2025-07-24 PROCEDURE — 1036F TOBACCO NON-USER: CPT | Performed by: INTERNAL MEDICINE

## 2025-07-24 PROCEDURE — 99211 OFF/OP EST MAY X REQ PHY/QHP: CPT | Performed by: INTERNAL MEDICINE

## 2025-07-24 PROCEDURE — 1123F ACP DISCUSS/DSCN MKR DOCD: CPT | Performed by: INTERNAL MEDICINE

## 2025-07-24 PROCEDURE — 1159F MED LIST DOCD IN RCRD: CPT | Performed by: INTERNAL MEDICINE

## 2025-07-24 PROCEDURE — 3051F HG A1C>EQUAL 7.0%<8.0%: CPT | Performed by: INTERNAL MEDICINE

## 2025-07-24 NOTE — PROGRESS NOTES
Stress ECG: The stress ECG was not diagnostic due to baseline ECG   abnormality.    The study was done for the assessment of chronotropic response to   exercise.    1-Patient exercised on modified Emmanuel protocol for 8 minutes and 14   seconds baseline heart rate is 57 bpm, peak heart rate is 100 bpm which   represents 77% of the maximum predicted heart rate.  This is a relatively   adequate chronotropic response to exercise.   2-No stress-induced hypotension.  3-No stress-induced arrhythmia.  4-Test terminated due to fatigue and shortness of breath.  No dizziness,   lightheadedness or presyncope.    5-Clinical correlation indicated.       ASSESSMENT:       Diagnosis Orders   1. PAF (paroxysmal atrial fibrillation) ([facility])        2. 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 Iron deficiency anemia due to chronic blood loss likely from Eliquis  Iron and TIBC    Ferritin    CBC with Auto Differential                 PLAN:     I personally reviewed results of lab work-up imaging studies,outside records and other relevant clinical data. 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

## 2025-08-11 ENCOUNTER — OFFICE VISIT (OUTPATIENT)
Dept: SURGERY | Age: 89
End: 2025-08-11
Payer: MEDICARE

## 2025-08-11 VITALS
SYSTOLIC BLOOD PRESSURE: 106 MMHG | HEART RATE: 68 BPM | BODY MASS INDEX: 22.67 KG/M2 | DIASTOLIC BLOOD PRESSURE: 49 MMHG | WEIGHT: 158 LBS

## 2025-08-11 DIAGNOSIS — L89.152 PRESSURE INJURY OF SACRAL REGION, STAGE 2 (HCC): Primary | ICD-10-CM

## 2025-08-11 PROBLEM — L89.151 PRESSURE INJURY OF SACRAL REGION, STAGE 1: Status: RESOLVED | Noted: 2024-01-29 | Resolved: 2025-08-11

## 2025-08-11 PROCEDURE — G8428 CUR MEDS NOT DOCUMENT: HCPCS | Performed by: SURGERY

## 2025-08-11 PROCEDURE — 1123F ACP DISCUSS/DSCN MKR DOCD: CPT | Performed by: SURGERY

## 2025-08-11 PROCEDURE — 99212 OFFICE O/P EST SF 10 MIN: CPT | Performed by: SURGERY

## 2025-08-11 PROCEDURE — G8420 CALC BMI NORM PARAMETERS: HCPCS | Performed by: SURGERY

## 2025-08-11 PROCEDURE — 1036F TOBACCO NON-USER: CPT | Performed by: SURGERY
